# Patient Record
Sex: MALE | Race: WHITE | NOT HISPANIC OR LATINO
[De-identification: names, ages, dates, MRNs, and addresses within clinical notes are randomized per-mention and may not be internally consistent; named-entity substitution may affect disease eponyms.]

---

## 2017-02-02 PROBLEM — Z00.00 ENCOUNTER FOR PREVENTIVE HEALTH EXAMINATION: Status: ACTIVE | Noted: 2017-02-02

## 2017-02-13 ENCOUNTER — APPOINTMENT (OUTPATIENT)
Dept: SPINE | Facility: CLINIC | Age: 82
End: 2017-02-13

## 2017-02-13 VITALS
SYSTOLIC BLOOD PRESSURE: 114 MMHG | DIASTOLIC BLOOD PRESSURE: 65 MMHG | BODY MASS INDEX: 24.14 KG/M2 | HEIGHT: 69 IN | WEIGHT: 163 LBS | HEART RATE: 60 BPM

## 2017-02-13 DIAGNOSIS — I25.10 ATHEROSCLEROTIC HEART DISEASE OF NATIVE CORONARY ARTERY W/OUT ANGINA PECTORIS: ICD-10-CM

## 2017-02-13 DIAGNOSIS — Z82.49 FAMILY HISTORY OF ISCHEMIC HEART DISEASE AND OTHER DISEASES OF THE CIRCULATORY SYSTEM: ICD-10-CM

## 2017-02-13 DIAGNOSIS — Z78.9 OTHER SPECIFIED HEALTH STATUS: ICD-10-CM

## 2017-02-13 DIAGNOSIS — Z86.79 PERSONAL HISTORY OF OTHER DISEASES OF THE CIRCULATORY SYSTEM: ICD-10-CM

## 2017-02-13 DIAGNOSIS — Z86.19 PERSONAL HISTORY OF OTHER INFECTIOUS AND PARASITIC DISEASES: ICD-10-CM

## 2017-02-13 DIAGNOSIS — H91.90 UNSPECIFIED HEARING LOSS, UNSPECIFIED EAR: ICD-10-CM

## 2017-02-13 DIAGNOSIS — I62.00 NONTRAUMATIC SUBDURAL HEMORRHAGE, UNSPECIFIED: ICD-10-CM

## 2017-02-13 RX ORDER — TERAZOSIN 5 MG/1
5 CAPSULE ORAL
Refills: 0 | Status: ACTIVE | COMMUNITY

## 2017-02-13 RX ORDER — CHLORTHALIDONE 50 MG/1
TABLET ORAL
Refills: 0 | Status: ACTIVE | COMMUNITY

## 2017-02-13 RX ORDER — WARFARIN 3 MG/1
3 TABLET ORAL
Refills: 0 | Status: ACTIVE | COMMUNITY

## 2017-02-13 RX ORDER — ATORVASTATIN CALCIUM 20 MG/1
20 TABLET, FILM COATED ORAL
Refills: 0 | Status: ACTIVE | COMMUNITY

## 2017-02-13 RX ORDER — ASPIRIN 81 MG
81 TABLET, DELAYED RELEASE (ENTERIC COATED) ORAL
Refills: 0 | Status: ACTIVE | COMMUNITY

## 2017-02-13 RX ORDER — OMEPRAZOLE MAGNESIUM 20 MG/1
20 CAPSULE, DELAYED RELEASE ORAL
Refills: 0 | Status: ACTIVE | COMMUNITY

## 2017-02-13 RX ORDER — POTASSIUM CITRATE 10 MEQ/1
10 MEQ TABLET, EXTENDED RELEASE ORAL
Refills: 0 | Status: ACTIVE | COMMUNITY

## 2017-02-13 RX ORDER — LOSARTAN POTASSIUM 100 MG/1
100 TABLET, FILM COATED ORAL
Refills: 0 | Status: ACTIVE | COMMUNITY

## 2017-02-13 RX ORDER — FINASTERIDE 5 MG/1
5 TABLET, FILM COATED ORAL
Refills: 0 | Status: ACTIVE | COMMUNITY

## 2023-06-28 ENCOUNTER — INPATIENT (INPATIENT)
Facility: HOSPITAL | Age: 88
LOS: 4 days | Discharge: NURSING, REHAB OR LONG-TERM CARE | DRG: 689 | End: 2023-07-03
Attending: STUDENT IN AN ORGANIZED HEALTH CARE EDUCATION/TRAINING PROGRAM | Admitting: HOSPITALIST
Payer: MEDICARE

## 2023-06-28 VITALS
HEIGHT: 69 IN | HEART RATE: 63 BPM | TEMPERATURE: 98 F | RESPIRATION RATE: 17 BRPM | OXYGEN SATURATION: 99 % | WEIGHT: 169.98 LBS | DIASTOLIC BLOOD PRESSURE: 62 MMHG | SYSTOLIC BLOOD PRESSURE: 96 MMHG

## 2023-06-28 LAB
ALBUMIN SERPL ELPH-MCNC: 3.6 G/DL — SIGNIFICANT CHANGE UP (ref 3.3–5)
ALP SERPL-CCNC: 156 U/L — HIGH (ref 40–120)
ALT FLD-CCNC: 16 U/L — SIGNIFICANT CHANGE UP (ref 10–45)
ANION GAP SERPL CALC-SCNC: 14 MMOL/L — SIGNIFICANT CHANGE UP (ref 5–17)
AST SERPL-CCNC: 25 U/L — SIGNIFICANT CHANGE UP (ref 10–40)
BASOPHILS # BLD AUTO: 0.07 K/UL — SIGNIFICANT CHANGE UP (ref 0–0.2)
BASOPHILS NFR BLD AUTO: 0.7 % — SIGNIFICANT CHANGE UP (ref 0–2)
BILIRUB SERPL-MCNC: 0.5 MG/DL — SIGNIFICANT CHANGE UP (ref 0.2–1.2)
BUN SERPL-MCNC: 50 MG/DL — HIGH (ref 7–23)
CALCIUM SERPL-MCNC: 9.6 MG/DL — SIGNIFICANT CHANGE UP (ref 8.4–10.5)
CHLORIDE SERPL-SCNC: 104 MMOL/L — SIGNIFICANT CHANGE UP (ref 96–108)
CO2 SERPL-SCNC: 20 MMOL/L — LOW (ref 22–31)
CREAT SERPL-MCNC: 2.16 MG/DL — HIGH (ref 0.5–1.3)
EGFR: 28 ML/MIN/1.73M2 — LOW
EOSINOPHIL # BLD AUTO: 0.38 K/UL — SIGNIFICANT CHANGE UP (ref 0–0.5)
EOSINOPHIL NFR BLD AUTO: 4 % — SIGNIFICANT CHANGE UP (ref 0–6)
GAS PNL BLDV: SIGNIFICANT CHANGE UP
GLUCOSE SERPL-MCNC: 87 MG/DL — SIGNIFICANT CHANGE UP (ref 70–99)
HCT VFR BLD CALC: 41.7 % — SIGNIFICANT CHANGE UP (ref 39–50)
HGB BLD-MCNC: 13.4 G/DL — SIGNIFICANT CHANGE UP (ref 13–17)
IMM GRANULOCYTES NFR BLD AUTO: 0.3 % — SIGNIFICANT CHANGE UP (ref 0–0.9)
LYMPHOCYTES # BLD AUTO: 1.95 K/UL — SIGNIFICANT CHANGE UP (ref 1–3.3)
LYMPHOCYTES # BLD AUTO: 20.6 % — SIGNIFICANT CHANGE UP (ref 13–44)
MAGNESIUM SERPL-MCNC: 2.3 MG/DL — SIGNIFICANT CHANGE UP (ref 1.6–2.6)
MCHC RBC-ENTMCNC: 28.9 PG — SIGNIFICANT CHANGE UP (ref 27–34)
MCHC RBC-ENTMCNC: 32.1 GM/DL — SIGNIFICANT CHANGE UP (ref 32–36)
MCV RBC AUTO: 90.1 FL — SIGNIFICANT CHANGE UP (ref 80–100)
MONOCYTES # BLD AUTO: 1.2 K/UL — HIGH (ref 0–0.9)
MONOCYTES NFR BLD AUTO: 12.7 % — SIGNIFICANT CHANGE UP (ref 2–14)
NEUTROPHILS # BLD AUTO: 5.83 K/UL — SIGNIFICANT CHANGE UP (ref 1.8–7.4)
NEUTROPHILS NFR BLD AUTO: 61.7 % — SIGNIFICANT CHANGE UP (ref 43–77)
NRBC # BLD: 0 /100 WBCS — SIGNIFICANT CHANGE UP (ref 0–0)
PHOSPHATE SERPL-MCNC: 4.1 MG/DL — SIGNIFICANT CHANGE UP (ref 2.5–4.5)
PLATELET # BLD AUTO: 353 K/UL — SIGNIFICANT CHANGE UP (ref 150–400)
POTASSIUM SERPL-MCNC: 5.5 MMOL/L — HIGH (ref 3.5–5.3)
POTASSIUM SERPL-SCNC: 5.5 MMOL/L — HIGH (ref 3.5–5.3)
PROT SERPL-MCNC: 7.5 G/DL — SIGNIFICANT CHANGE UP (ref 6–8.3)
RBC # BLD: 4.63 M/UL — SIGNIFICANT CHANGE UP (ref 4.2–5.8)
RBC # FLD: 14.6 % — HIGH (ref 10.3–14.5)
SODIUM SERPL-SCNC: 138 MMOL/L — SIGNIFICANT CHANGE UP (ref 135–145)
WBC # BLD: 9.46 K/UL — SIGNIFICANT CHANGE UP (ref 3.8–10.5)
WBC # FLD AUTO: 9.46 K/UL — SIGNIFICANT CHANGE UP (ref 3.8–10.5)

## 2023-06-28 PROCEDURE — 71045 X-RAY EXAM CHEST 1 VIEW: CPT | Mod: 26

## 2023-06-28 PROCEDURE — 99285 EMERGENCY DEPT VISIT HI MDM: CPT

## 2023-06-28 RX ORDER — SODIUM CHLORIDE 9 MG/ML
1000 INJECTION INTRAMUSCULAR; INTRAVENOUS; SUBCUTANEOUS ONCE
Refills: 0 | Status: COMPLETED | OUTPATIENT
Start: 2023-06-28 | End: 2023-06-28

## 2023-06-28 RX ADMIN — SODIUM CHLORIDE 2000 MILLILITER(S): 9 INJECTION INTRAMUSCULAR; INTRAVENOUS; SUBCUTANEOUS at 17:18

## 2023-06-28 NOTE — ED ADULT NURSE NOTE - NSFALLHARMRISKINTERV_ED_ALL_ED

## 2023-06-28 NOTE — ED PROVIDER NOTE - IV ALTEPLASE EXCL REL HIDDEN
show
Patient is consistently future oriented and treatment seeking while on the unit, consistently denied suicidal ideation with the exception of the day of admission.

## 2023-06-28 NOTE — ED ADULT NURSE REASSESSMENT NOTE - NS ED NURSE REASSESS COMMENT FT1
Report received from JEAN MARIE Cee. Pt received A&Ox2-3, instructed on need for urine specimen sample, pt given urinal and attempted to urinate, states no need to urinate at this time. Bed locked and in lowest position, side rails raised, call bell within reach. Currently pending urine sample.

## 2023-06-28 NOTE — ED ADULT NURSE NOTE - OBJECTIVE STATEMENT
Male 89 years old with past medical history of Pacemaker, Stents and Afib, had UTI 3-4 weeks ago, treated with 2 antibiotics,  as per daughter for the past weeks pt is weak, decrease appetite and lethargic. Denies chest pain, sob, fever, chills, N/V or abdominal pain. Denies dysuria or hematuria. Safety and comfort maintained. Call bell within easy reach. Will continue to monitor. Male 89 years old with past medical history of Pacemaker, Stents and Afib, had UTI 3-4 weeks ago, treated with Levaquin and Bactrim,  as per daughter since then pt is weak, decrease appetite and lethargic. Denies chest pain, sob, fever, chills, N/V or abdominal pain. Denies dysuria or hematuria. Daughter states at baseline pt usually walks with a walker but for the past weeks pt been very weak to ambulate. Safety and comfort maintained. Call bell within easy reach. Will continue to monitor.

## 2023-06-28 NOTE — ED PROVIDER NOTE - CLINICAL SUMMARY MEDICAL DECISION MAKING FREE TEXT BOX
89-year-old male with past medical history of CHF, CAD with stents, CABG, PPM, recent UTI, presenting with 3 weeks of lethargy, decreased p.o., and difficulty walking. suggestive of uti vs electrolyte disturbances, will eval with labs, cxr, ekg, will give ivf, will reassess.

## 2023-06-28 NOTE — ED PROVIDER NOTE - OBJECTIVE STATEMENT
89-year-old male with past medical history of CHF, CAD with stents, CABG, PPM, recent UTI, presenting with 3 weeks of lethargy, decreased p.o., and difficulty walking.  Patient recently treated for UTI 3 weeks ago with oral Levaquin and then oral Bactrim.  Since then has had progressive lethargy decreased p.o. and difficulty walking at baseline with walker.  Came to ER for further evaluation.    Denies chest pain, shortness breath, LOC, falls, nausea vomiting, fevers, sick contact, recent travel. 89-year-old male with past medical history of CHF, CAD with stents, CABG, PPM, recent UTI, presenting with 3 weeks of lethargy, decreased p.o., and difficulty walking.  Patient recently treated for UTI 3 weeks ago with oral Levaquin and then oral Bactrim.  Since then has had progressive lethargy decreased p.o. and difficulty walking at baseline with walker.  Came to ER for further evaluation.    Denies chest pain, shortness breath, LOC, falls, nausea vomiting, fevers, sick contact, recent travel.    Attendinyo male presents with increased lethargy and decreased po intake for the past 2 weeks.  was treated for UTIs twice with different antibiotics in the past month.

## 2023-06-29 DIAGNOSIS — N39.0 URINARY TRACT INFECTION, SITE NOT SPECIFIED: ICD-10-CM

## 2023-06-29 DIAGNOSIS — N17.9 ACUTE KIDNEY FAILURE, UNSPECIFIED: ICD-10-CM

## 2023-06-29 DIAGNOSIS — I25.10 ATHEROSCLEROTIC HEART DISEASE OF NATIVE CORONARY ARTERY WITHOUT ANGINA PECTORIS: ICD-10-CM

## 2023-06-29 DIAGNOSIS — Z82.49 FAMILY HISTORY OF ISCHEMIC HEART DISEASE AND OTHER DISEASES OF THE CIRCULATORY SYSTEM: Chronic | ICD-10-CM

## 2023-06-29 DIAGNOSIS — R09.89 OTHER SPECIFIED SYMPTOMS AND SIGNS INVOLVING THE CIRCULATORY AND RESPIRATORY SYSTEMS: ICD-10-CM

## 2023-06-29 DIAGNOSIS — N40.0 BENIGN PROSTATIC HYPERPLASIA WITHOUT LOWER URINARY TRACT SYMPTOMS: ICD-10-CM

## 2023-06-29 DIAGNOSIS — E87.5 HYPERKALEMIA: ICD-10-CM

## 2023-06-29 DIAGNOSIS — Z29.9 ENCOUNTER FOR PROPHYLACTIC MEASURES, UNSPECIFIED: ICD-10-CM

## 2023-06-29 DIAGNOSIS — I50.9 HEART FAILURE, UNSPECIFIED: ICD-10-CM

## 2023-06-29 DIAGNOSIS — R53.1 WEAKNESS: ICD-10-CM

## 2023-06-29 DIAGNOSIS — R56.9 UNSPECIFIED CONVULSIONS: ICD-10-CM

## 2023-06-29 LAB
ALBUMIN SERPL ELPH-MCNC: 3.3 G/DL — SIGNIFICANT CHANGE UP (ref 3.3–5)
ALP SERPL-CCNC: 143 U/L — HIGH (ref 40–120)
ALT FLD-CCNC: 14 U/L — SIGNIFICANT CHANGE UP (ref 10–45)
ANION GAP SERPL CALC-SCNC: 15 MMOL/L — SIGNIFICANT CHANGE UP (ref 5–17)
APPEARANCE UR: ABNORMAL
AST SERPL-CCNC: 22 U/L — SIGNIFICANT CHANGE UP (ref 10–40)
BASOPHILS # BLD AUTO: 0.05 K/UL — SIGNIFICANT CHANGE UP (ref 0–0.2)
BASOPHILS NFR BLD AUTO: 0.6 % — SIGNIFICANT CHANGE UP (ref 0–2)
BILIRUB SERPL-MCNC: 0.6 MG/DL — SIGNIFICANT CHANGE UP (ref 0.2–1.2)
BILIRUB UR-MCNC: NEGATIVE — SIGNIFICANT CHANGE UP
BUN SERPL-MCNC: 44 MG/DL — HIGH (ref 7–23)
CALCIUM SERPL-MCNC: 9.3 MG/DL — SIGNIFICANT CHANGE UP (ref 8.4–10.5)
CHLORIDE SERPL-SCNC: 104 MMOL/L — SIGNIFICANT CHANGE UP (ref 96–108)
CO2 SERPL-SCNC: 19 MMOL/L — LOW (ref 22–31)
COLOR SPEC: ABNORMAL
CREAT SERPL-MCNC: 1.96 MG/DL — HIGH (ref 0.5–1.3)
DIFF PNL FLD: ABNORMAL
EGFR: 31 ML/MIN/1.73M2 — LOW
EOSINOPHIL # BLD AUTO: 0.32 K/UL — SIGNIFICANT CHANGE UP (ref 0–0.5)
EOSINOPHIL NFR BLD AUTO: 4 % — SIGNIFICANT CHANGE UP (ref 0–6)
GLUCOSE SERPL-MCNC: 75 MG/DL — SIGNIFICANT CHANGE UP (ref 70–99)
GLUCOSE UR QL: NEGATIVE — SIGNIFICANT CHANGE UP
HCT VFR BLD CALC: 41.6 % — SIGNIFICANT CHANGE UP (ref 39–50)
HGB BLD-MCNC: 13 G/DL — SIGNIFICANT CHANGE UP (ref 13–17)
IMM GRANULOCYTES NFR BLD AUTO: 0.4 % — SIGNIFICANT CHANGE UP (ref 0–0.9)
KETONES UR-MCNC: NEGATIVE — SIGNIFICANT CHANGE UP
LEUKOCYTE ESTERASE UR-ACNC: ABNORMAL
LYMPHOCYTES # BLD AUTO: 1.88 K/UL — SIGNIFICANT CHANGE UP (ref 1–3.3)
LYMPHOCYTES # BLD AUTO: 23.5 % — SIGNIFICANT CHANGE UP (ref 13–44)
MAGNESIUM SERPL-MCNC: 2.2 MG/DL — SIGNIFICANT CHANGE UP (ref 1.6–2.6)
MCHC RBC-ENTMCNC: 29.4 PG — SIGNIFICANT CHANGE UP (ref 27–34)
MCHC RBC-ENTMCNC: 31.3 GM/DL — LOW (ref 32–36)
MCV RBC AUTO: 94.1 FL — SIGNIFICANT CHANGE UP (ref 80–100)
MONOCYTES # BLD AUTO: 0.88 K/UL — SIGNIFICANT CHANGE UP (ref 0–0.9)
MONOCYTES NFR BLD AUTO: 11 % — SIGNIFICANT CHANGE UP (ref 2–14)
NEUTROPHILS # BLD AUTO: 4.84 K/UL — SIGNIFICANT CHANGE UP (ref 1.8–7.4)
NEUTROPHILS NFR BLD AUTO: 60.5 % — SIGNIFICANT CHANGE UP (ref 43–77)
NITRITE UR-MCNC: NEGATIVE — SIGNIFICANT CHANGE UP
NRBC # BLD: 0 /100 WBCS — SIGNIFICANT CHANGE UP (ref 0–0)
PH UR: 5.5 — SIGNIFICANT CHANGE UP (ref 5–8)
PLATELET # BLD AUTO: 273 K/UL — SIGNIFICANT CHANGE UP (ref 150–400)
POTASSIUM SERPL-MCNC: 5.2 MMOL/L — SIGNIFICANT CHANGE UP (ref 3.5–5.3)
POTASSIUM SERPL-SCNC: 5.2 MMOL/L — SIGNIFICANT CHANGE UP (ref 3.5–5.3)
PROT SERPL-MCNC: 7.1 G/DL — SIGNIFICANT CHANGE UP (ref 6–8.3)
PROT UR-MCNC: ABNORMAL
RBC # BLD: 4.42 M/UL — SIGNIFICANT CHANGE UP (ref 4.2–5.8)
RBC # FLD: 14.5 % — SIGNIFICANT CHANGE UP (ref 10.3–14.5)
SODIUM SERPL-SCNC: 138 MMOL/L — SIGNIFICANT CHANGE UP (ref 135–145)
SP GR SPEC: 1.01 — SIGNIFICANT CHANGE UP (ref 1.01–1.02)
UROBILINOGEN FLD QL: NEGATIVE — SIGNIFICANT CHANGE UP
WBC # BLD: 8 K/UL — SIGNIFICANT CHANGE UP (ref 3.8–10.5)
WBC # FLD AUTO: 8 K/UL — SIGNIFICANT CHANGE UP (ref 3.8–10.5)

## 2023-06-29 PROCEDURE — 70450 CT HEAD/BRAIN W/O DYE: CPT | Mod: 26

## 2023-06-29 PROCEDURE — 99223 1ST HOSP IP/OBS HIGH 75: CPT

## 2023-06-29 PROCEDURE — 76770 US EXAM ABDO BACK WALL COMP: CPT | Mod: 26

## 2023-06-29 PROCEDURE — 99221 1ST HOSP IP/OBS SF/LOW 40: CPT

## 2023-06-29 RX ORDER — ASPIRIN/CALCIUM CARB/MAGNESIUM 324 MG
81 TABLET ORAL DAILY
Refills: 0 | Status: DISCONTINUED | OUTPATIENT
Start: 2023-06-29 | End: 2023-07-03

## 2023-06-29 RX ORDER — PANTOPRAZOLE SODIUM 20 MG/1
40 TABLET, DELAYED RELEASE ORAL
Refills: 0 | Status: DISCONTINUED | OUTPATIENT
Start: 2023-06-29 | End: 2023-07-03

## 2023-06-29 RX ORDER — ACETAMINOPHEN 500 MG
650 TABLET ORAL EVERY 6 HOURS
Refills: 0 | Status: DISCONTINUED | OUTPATIENT
Start: 2023-06-29 | End: 2023-07-03

## 2023-06-29 RX ORDER — LEVETIRACETAM 250 MG/1
500 TABLET, FILM COATED ORAL
Refills: 0 | Status: DISCONTINUED | OUTPATIENT
Start: 2023-06-29 | End: 2023-07-03

## 2023-06-29 RX ORDER — ONDANSETRON 8 MG/1
4 TABLET, FILM COATED ORAL EVERY 8 HOURS
Refills: 0 | Status: DISCONTINUED | OUTPATIENT
Start: 2023-06-29 | End: 2023-07-03

## 2023-06-29 RX ORDER — SODIUM ZIRCONIUM CYCLOSILICATE 10 G/10G
5 POWDER, FOR SUSPENSION ORAL ONCE
Refills: 0 | Status: COMPLETED | OUTPATIENT
Start: 2023-06-29 | End: 2023-06-29

## 2023-06-29 RX ORDER — CEFTRIAXONE 500 MG/1
1000 INJECTION, POWDER, FOR SOLUTION INTRAMUSCULAR; INTRAVENOUS EVERY 24 HOURS
Refills: 0 | Status: DISCONTINUED | OUTPATIENT
Start: 2023-06-29 | End: 2023-06-29

## 2023-06-29 RX ORDER — FOLIC ACID 0.8 MG
1 TABLET ORAL DAILY
Refills: 0 | Status: DISCONTINUED | OUTPATIENT
Start: 2023-06-29 | End: 2023-07-03

## 2023-06-29 RX ORDER — METOPROLOL TARTRATE 50 MG
25 TABLET ORAL
Refills: 0 | Status: DISCONTINUED | OUTPATIENT
Start: 2023-06-29 | End: 2023-06-30

## 2023-06-29 RX ORDER — LANOLIN ALCOHOL/MO/W.PET/CERES
3 CREAM (GRAM) TOPICAL AT BEDTIME
Refills: 0 | Status: DISCONTINUED | OUTPATIENT
Start: 2023-06-29 | End: 2023-07-03

## 2023-06-29 RX ORDER — CEFTRIAXONE 500 MG/1
1000 INJECTION, POWDER, FOR SOLUTION INTRAMUSCULAR; INTRAVENOUS EVERY 24 HOURS
Refills: 0 | Status: DISCONTINUED | OUTPATIENT
Start: 2023-06-30 | End: 2023-07-03

## 2023-06-29 RX ORDER — ATORVASTATIN CALCIUM 80 MG/1
80 TABLET, FILM COATED ORAL AT BEDTIME
Refills: 0 | Status: DISCONTINUED | OUTPATIENT
Start: 2023-06-29 | End: 2023-07-03

## 2023-06-29 RX ADMIN — Medication 3 MILLIGRAM(S): at 22:15

## 2023-06-29 RX ADMIN — CEFTRIAXONE 100 MILLIGRAM(S): 500 INJECTION, POWDER, FOR SOLUTION INTRAMUSCULAR; INTRAVENOUS at 01:33

## 2023-06-29 RX ADMIN — Medication 25 MILLIGRAM(S): at 18:24

## 2023-06-29 RX ADMIN — LEVETIRACETAM 500 MILLIGRAM(S): 250 TABLET, FILM COATED ORAL at 05:50

## 2023-06-29 RX ADMIN — Medication 1 MILLIGRAM(S): at 15:14

## 2023-06-29 RX ADMIN — ATORVASTATIN CALCIUM 80 MILLIGRAM(S): 80 TABLET, FILM COATED ORAL at 22:15

## 2023-06-29 RX ADMIN — Medication 25 MILLIGRAM(S): at 05:52

## 2023-06-29 RX ADMIN — SODIUM ZIRCONIUM CYCLOSILICATE 5 GRAM(S): 10 POWDER, FOR SUSPENSION ORAL at 09:25

## 2023-06-29 RX ADMIN — LEVETIRACETAM 500 MILLIGRAM(S): 250 TABLET, FILM COATED ORAL at 18:24

## 2023-06-29 RX ADMIN — Medication 81 MILLIGRAM(S): at 15:13

## 2023-06-29 RX ADMIN — PANTOPRAZOLE SODIUM 40 MILLIGRAM(S): 20 TABLET, DELAYED RELEASE ORAL at 06:15

## 2023-06-29 NOTE — H&P ADULT - NSHPPHYSICALEXAM_GEN_ALL_CORE
Vital Signs Last 24 Hrs  T(C): 36.4 (06-29-23 @ 02:09), Max: 36.9 (06-28-23 @ 16:59)  T(F): 97.6 (06-29-23 @ 02:09), Max: 98.4 (06-28-23 @ 16:59)  HR: 63 (06-29-23 @ 02:09) (60 - 88)  BP: 126/62 (06-29-23 @ 02:09) (94/69 - 126/62)  BP(mean): 77 (06-28-23 @ 23:45) (73 - 87)  RR: 16 (06-29-23 @ 02:09) (16 - 18)  SpO2: 98% (06-29-23 @ 02:09) (96% - 100%)

## 2023-06-29 NOTE — PATIENT PROFILE ADULT - FALL HARM RISK - HARM RISK INTERVENTIONS

## 2023-06-29 NOTE — PHYSICAL THERAPY INITIAL EVALUATION ADULT - PLANNED THERAPY INTERVENTIONS, PT EVAL
GOAL: Patient will be able to negotiate 2 steps in 2 weeks/balance training/bed mobility training/gait training/strengthening/transfer training

## 2023-06-29 NOTE — CHART NOTE - NSCHARTNOTEFT_GEN_A_CORE
Patient seen and examined at bedside. No acute overnight events. Pt denies dyspnea, chest pain fevers, chills, urinary or bowel changes, active sites of bleeding or any other symptoms at this time.    PE: elderly male patient, following commands. Otherwise unremarkable.     Labs and imaging reviewed.    Plan:   -C/w abx for UTI, f/u on urine cx   -Hyperkalemia resolved   -CT head pending     Called daughter Anushka to update on management, left VM. Will try again for tomorrow.

## 2023-06-29 NOTE — PHYSICAL THERAPY INITIAL EVALUATION ADULT - ADDITIONAL COMMENTS
Patient lives alone with 24 hrs HHA in a 2 level house; as per HHA present at the bedside: 2 steps to enter w/o hand rails, chair lift inside to reach bedroom. Pt. ambulates independently w/RW, needs assistance w/ADLs. As per HHA, its been 2 weeks since pt. ambulated independently. HHA denies any fall. Pt. was unable to participate in interview questions 2/2 hearing impairment, pt. wears hearing aid, but it was not charged at the time of IE.

## 2023-06-29 NOTE — H&P ADULT - HISTORY OF PRESENT ILLNESS
89M w/ hx of CAD s/p CABG, s/p PPM, CHF?, HTN, recent UTI p/w decreased PO and weakness.  89M w/ hx of CAD s/p CABG, s/p PPM, CHF, seizures, CKD, anemia HTN, recent UTI p/w decreased PO and weakness. Pt has been very weak and almost bed bound for the past month. Pt has hx of UTIs requiring admission as recently as 1 year ago as per daughter. Around June 4th daughter became concerned over change in pt's mood and awareness. Called PMD on phone and was prescribed 1 week course of Levaquin. Pt did not seem to improve significantly and pt went to see urologist around 6/14. Reportedly urine was checked and pt was switched to Bactrim. Took course from 6/16-6/19. Requested repeat urine testing 6/22 at urology office but results have not returned. Pt's daughter checks on father in person every Wednesday. Earlier, daughter went to see father who seemed too weak and confused so brought him to hospital for further evaluation. Family stopped pt's spironolactone and losartan around 2 weeks ago for episodes of hypotension. Pt otherwise compliant with meds as they are provided by [facility]. Pt endorsing some intermittent dysuria and urinary frequency. Endorses also feeling weak and confused.    In ER: Given IV Ceftriaxone NS 1L

## 2023-06-29 NOTE — PHYSICAL THERAPY INITIAL EVALUATION ADULT - PERTINENT HX OF CURRENT PROBLEM, REHAB EVAL
89M w/ hx of CAD s/p CABG, s/p PPM, CHF, seizures, CKD, anemia HTN, recent UTI p/w decreased PO and weakness. Pt has been very weak and almost bed bound for the past month. Pt has hx of UTIs requiring admission as recently as 1 year ago as per daughter. Around June 4th daughter became concerned over change in pt's mood and awareness. Called PMD on phone and was prescribed 1 week course of Levaquin. Pt did not seem to improve significantly and pt went to see urologist around 6/14. Reportedly urine was checked and pt was switched to Bactrim. Took course from 6/16-6/19. Requested repeat urine testing 6/22 at urology office but results have not returned. Pt's daughter checks on father in person every Wednesday. Earlier, daughter went to see father who seemed too weak and confused so brought him to hospital for further evaluation. Family stopped pt's spironolactone and losartan around 2 weeks ago for episodes of hypotension. Pt otherwise compliant with meds as they are provided by [facility]. Pt endorsing some intermittent dysuria and urinary frequency. Endorses also feeling weak and confused. Hospital course: 6/28 CXR: No focal consolidation. 6/29 US Kidney/Bladder: Right kidney moderate hydronephrosis, with bilateral ureteral jets visualized in the urinary bladder. No obstructing calculus identified.

## 2023-06-29 NOTE — H&P ADULT - NSICDXPASTMEDICALHX_GEN_ALL_CORE_FT
PAST MEDICAL HISTORY:  CAD (coronary artery disease)     CHF, chronic     Essential hypertension

## 2023-06-29 NOTE — H&P ADULT - ASSESSMENT
89M w/ hx of CAD s/p CABG, s/p PPM, CHF, seizures, CKD, anemia HTN, recent UTI p/w decreased PO and weakness concerning for UTI as well as hyperkalemia and SOPHY on CKD

## 2023-06-29 NOTE — H&P ADULT - PROBLEM SELECTOR PLAN 2
Possibly from UTI. Given it seems to have been ongoing for 1 month despite multiple courses of antibiotics for UTI will order CTH as well given hx of SDH in past. Not on AC  -Falls precautions  -PT consult  -Will order CTH non-con

## 2023-06-29 NOTE — H&P ADULT - PROBLEM SELECTOR PLAN 7
Will sometimes take lasix at home with he gains weight. Family had increased dose until beginning of this month. s/p PPM. Currently appears relatively euvolemic  -Now off losartan and spironolactone  -Cont. metoprolol BID with hold parameters

## 2023-06-29 NOTE — H&P ADULT - PROBLEM SELECTOR PLAN 4
[de-identified] : VPA 63, CBC/CMP normal [FreeTextEntry1] : CBC and CMP normal\par VPA 74 Baseline crt 1.5 in prior records. Pre-renal in nature? Also hx of kidney stones as per daughter  -Trend BMP  -Renal dose medications  -F/u Kidney and bladder US

## 2023-06-29 NOTE — H&P ADULT - NSHPSOURCEINFORD_GEN_ALL_CORE
Last Durolane injection given on 6/2/21 in right knee.  Ok to get new approval? Chart(s)/Patient Chart(s)/Patient/Child

## 2023-06-29 NOTE — H&P ADULT - PROBLEM SELECTOR PLAN 3
K 5.5 maybe in setting of SOPHY. Reportedly has not taken ARB or spironolactone for 2 weeks.  -Trend BMP, Mg  -Will order lokelma

## 2023-06-29 NOTE — H&P ADULT - TIME BILLING
Need to interview and examine patient, discuss care with family, provide counseling, and review prior medical records

## 2023-06-29 NOTE — H&P ADULT - NSHPSOCIALHISTORY_GEN_ALL_CORE
Denies smoking or ETOH. Usually uses walker but non-ambulatory recently. Has HHA, otherwise lives alone

## 2023-06-29 NOTE — PHYSICAL THERAPY INITIAL EVALUATION ADULT - GAIT DEVIATIONS NOTED, PT EVAL
decreased kamryn/decreased velocity of limb motion/decreased step length/decreased weight-shifting ability

## 2023-06-29 NOTE — H&P ADULT - NSHPLABSRESULTS_GEN_ALL_CORE
I have reviewed the labs, imaging and ekg. EKG with NSR HR 90 QTc 440 PVC, Flat III, aVF, CXR w/o focal consolidations

## 2023-06-29 NOTE — H&P ADULT - PROBLEM SELECTOR PLAN 1
UA could be considered positive. Hx of UTIs in past. S/p treatment with Levaquin and bactrim earlier this month.  -Cont. IV Ceftriaxone for now  -F/u UCx  -Given hx of prior UTIs, low threshold to broaden if concern increases

## 2023-06-29 NOTE — CONSULT NOTE ADULT - ASSESSMENT
Impression:    Sacral/bilateral Buttocks deep tissue injury present on admission  incontinence dermatitis  Incontinence of bowel and bladder  Pressure Injury Prophylaxis    Recommend:  1.) topical therapy: sacral/buttock injury - cleanse with incontinence cleanser, pat dry, apply Poncho ointment BID and PRN for incontinent episodes  2.) Incontinence Management - incontinence cleanser, pads, pericare BID  3.) Maintain on an alternating air with low air loss surface  4.) Turn and reposition Q 2 hours  5.) Nutrition optimization  6.) Offload heels/feet with complete cair air fluidized boots; ensure that the soles of the feet are not resting on the foot board of the bed.    Care as per medicine. Will not actively follow but will remain available. Please recall for new issues or deterioration.  Upon [facility] [address] [place] [address] [phone]  Thank you for this consult  Seen and discussed with clinical nurse  Kelly Thorpe, LIA-C, CWOCN via TEAMS

## 2023-06-30 ENCOUNTER — TRANSCRIPTION ENCOUNTER (OUTPATIENT)
Age: 88
End: 2023-06-30

## 2023-06-30 DIAGNOSIS — R00.1 BRADYCARDIA, UNSPECIFIED: ICD-10-CM

## 2023-06-30 LAB
ANION GAP SERPL CALC-SCNC: 15 MMOL/L — SIGNIFICANT CHANGE UP (ref 5–17)
BUN SERPL-MCNC: 41 MG/DL — HIGH (ref 7–23)
CALCIUM SERPL-MCNC: 9.6 MG/DL — SIGNIFICANT CHANGE UP (ref 8.4–10.5)
CHLORIDE SERPL-SCNC: 104 MMOL/L — SIGNIFICANT CHANGE UP (ref 96–108)
CO2 SERPL-SCNC: 16 MMOL/L — LOW (ref 22–31)
CREAT SERPL-MCNC: 1.87 MG/DL — HIGH (ref 0.5–1.3)
EGFR: 33 ML/MIN/1.73M2 — LOW
GLUCOSE SERPL-MCNC: 79 MG/DL — SIGNIFICANT CHANGE UP (ref 70–99)
POTASSIUM SERPL-MCNC: 5.6 MMOL/L — HIGH (ref 3.5–5.3)
POTASSIUM SERPL-SCNC: 5.6 MMOL/L — HIGH (ref 3.5–5.3)
SODIUM SERPL-SCNC: 135 MMOL/L — SIGNIFICANT CHANGE UP (ref 135–145)

## 2023-06-30 PROCEDURE — 99233 SBSQ HOSP IP/OBS HIGH 50: CPT

## 2023-06-30 PROCEDURE — 93010 ELECTROCARDIOGRAM REPORT: CPT

## 2023-06-30 RX ORDER — SODIUM ZIRCONIUM CYCLOSILICATE 10 G/10G
5 POWDER, FOR SUSPENSION ORAL ONCE
Refills: 0 | Status: COMPLETED | OUTPATIENT
Start: 2023-06-30 | End: 2023-06-30

## 2023-06-30 RX ADMIN — CEFTRIAXONE 100 MILLIGRAM(S): 500 INJECTION, POWDER, FOR SOLUTION INTRAMUSCULAR; INTRAVENOUS at 00:41

## 2023-06-30 RX ADMIN — Medication 81 MILLIGRAM(S): at 13:32

## 2023-06-30 RX ADMIN — PANTOPRAZOLE SODIUM 40 MILLIGRAM(S): 20 TABLET, DELAYED RELEASE ORAL at 10:05

## 2023-06-30 RX ADMIN — LEVETIRACETAM 500 MILLIGRAM(S): 250 TABLET, FILM COATED ORAL at 10:05

## 2023-06-30 RX ADMIN — ATORVASTATIN CALCIUM 80 MILLIGRAM(S): 80 TABLET, FILM COATED ORAL at 21:39

## 2023-06-30 RX ADMIN — SODIUM ZIRCONIUM CYCLOSILICATE 5 GRAM(S): 10 POWDER, FOR SUSPENSION ORAL at 17:24

## 2023-06-30 RX ADMIN — LEVETIRACETAM 500 MILLIGRAM(S): 250 TABLET, FILM COATED ORAL at 17:25

## 2023-06-30 RX ADMIN — Medication 1 MILLIGRAM(S): at 13:32

## 2023-06-30 RX ADMIN — Medication 3 MILLIGRAM(S): at 21:39

## 2023-06-30 NOTE — DIETITIAN INITIAL EVALUATION ADULT - ENERGY INTAKE
Poor (<50%) - Pt reports poor-fair appetite/PO intake since admission, is currently ordered for a low sodium diet. ~50% of meals consumed.   - Pt's aide requesting mighty shakes TID, reports pt does not like Ensure shakes.   - Pt made aware of menu ordering procedures in house, food preferences obtained will honor as able.

## 2023-06-30 NOTE — DIETITIAN INITIAL EVALUATION ADULT - PERTINENT LABORATORY DATA
06-30    135  |  104  |  41<H>  ----------------------------<  79  5.6<H>   |  16<L>  |  1.87<H>    Ca    9.6      30 Jun 2023 10:27  Phos  4.1     06-28  Mg     2.2     06-29    TPro  7.1  /  Alb  3.3  /  TBili  0.6  /  DBili  x   /  AST  22  /  ALT  14  /  AlkPhos  143<H>  06-29

## 2023-06-30 NOTE — DIETITIAN INITIAL EVALUATION ADULT - ORAL INTAKE PTA/DIET HISTORY
Pt's aide reports decreased appetite/PO intake x 1 month PTA, likely consuming <75% of nutrition needs >/1 month. Reports pt consumes 3 meals a day however experiences early satiety, supplements with smoothies and protein shakes PRN.   - NKFA/intolerances reported.   - No therapeutic restrictions reported.  - Micronutrient/Other supplementation: none   - Protein-energy supplementation: none   - No hx of chewing/swallowing difficulties.

## 2023-06-30 NOTE — DIETITIAN INITIAL EVALUATION ADULT - REASON INDICATOR FOR ASSESSMENT
Nutrition consult warranted for: pressure injury stage >/2   Information obtained from: electronic medical record, patient and aide at bedside.   Chart reviewed, events noted.

## 2023-06-30 NOTE — DIETITIAN INITIAL EVALUATION ADULT - PROBLEM SELECTOR PROBLEM 2
Spoke with patient and informed her that per office notes she is to continue both the omeprazole and Zantac. Patient stated that Dr. Wall does not agree with the acid reflux diagnosis and is referring her to the [facility] he believes its more vocal cord related. Patient stated that she does not notice much of a difference on the two medications make a difference and does not feel she needs the Zantac.     Ok to discontinue th Zantac ?    Weakness

## 2023-06-30 NOTE — PROVIDER CONTACT NOTE (OTHER) - ACTION/TREATMENT ORDERED:
EKG ordered and attempted, however pt adamantly refused, became agitated, and verbally abusive towards RN and PCA. Also refused AM meds. Provider made aware and 6 AM medications rescheduled.

## 2023-06-30 NOTE — DISCHARGE NOTE NURSING/CASE MANAGEMENT/SOCIAL WORK - NSDCPEFALRISK_GEN_ALL_CORE
For information on Fall & Injury Prevention, visit: [email or web address] OR  [email or web address] OR  https://www.cdc.gov/steadi/patient.html

## 2023-06-30 NOTE — PROGRESS NOTE ADULT - SUBJECTIVE AND OBJECTIVE BOX
[facility] Division of Hospital Medicine  Bell Eden MD  Available via MS Teams  Pager: [phone]    SUBJECTIVE / OVERNIGHT EVENTS: Patient seen and examined at bedside. Overnight, bradycardic while asleep, hr ranging between 38-45.  Pt denies dyspnea, chest pain fevers, chills, cough, HA, dizziness, syncope, chest palpitations, abd pain, n/v/d, urinary or bowel changes, active sites of bleeding or any other symptoms at this time.    ADDITIONAL REVIEW OF SYSTEMS: negative     MEDICATIONS  (STANDING):  aspirin enteric coated 81 milliGRAM(s) Oral daily  atorvastatin 80 milliGRAM(s) Oral at bedtime  cefTRIAXone   IVPB 1000 milliGRAM(s) IV Intermittent every 24 hours  folic acid 1 milliGRAM(s) Oral daily  levETIRAcetam 500 milliGRAM(s) Oral two times a day  pantoprazole    Tablet 40 milliGRAM(s) Oral before breakfast  sodium zirconium cyclosilicate 5 Gram(s) Oral once    MEDICATIONS  (PRN):  acetaminophen     Tablet .. 650 milliGRAM(s) Oral every 6 hours PRN Temp greater or equal to 38C (100.4F), Mild Pain (1 - 3)  melatonin 3 milliGRAM(s) Oral at bedtime PRN Insomnia  ondansetron Injectable 4 milliGRAM(s) IV Push every 8 hours PRN Nausea and/or Vomiting      I&O's Summary    29 Jun 2023 07:01  -  30 Jun 2023 07:00  --------------------------------------------------------  IN: 200 mL / OUT: 0 mL / NET: 200 mL        PHYSICAL EXAM:  Vital Signs Last 24 Hrs  T(C): 36.3 (30 Jun 2023 09:05), Max: 37.1 (29 Jun 2023 16:07)  T(F): 97.4 (30 Jun 2023 09:05), Max: 98.7 (29 Jun 2023 16:07)  HR: 59 (30 Jun 2023 09:05) (36 - 68)  BP: 129/60 (30 Jun 2023 09:05) (103/55 - 129/60)  BP(mean): --  RR: 18 (30 Jun 2023 09:05) (18 - 18)  SpO2: 94% (30 Jun 2023 09:05) (94% - 99%)    Parameters below as of 30 Jun 2023 09:05  Patient On (Oxygen Delivery Method): room air      CONSTITUTIONAL: frail elderly male   EYES: PERRLA; conjunctiva and sclera clear  ENMT: Moist oral mucosa, no pharyngeal injection or exudates; normal dentition  NECK: Supple, no palpable masses; no thyromegaly  RESPIRATORY: Normal respiratory effort; lungs are clear to auscultation bilaterally  CARDIOVASCULAR: Regular rate and rhythm, normal S1 and S2, no murmur/rub/gallop; No lower extremity edema; Peripheral pulses are 2+ bilaterally  ABDOMEN: Nontender to palpation, normoactive bowel sounds, no rebound/guarding; No hepatosplenomegaly  MUSCULOSKELETAL:   no clubbing or cyanosis of digits; no joint swelling or tenderness to palpation  PSYCH: A+O to person, place, and time; affect appropriate  NEUROLOGY: CN 2-12 are intact and symmetric; no gross sensory deficits   SKIN: No rashes; no palpable lesions    LABS:                        13.0   8.00  )-----------( 273      ( 29 Jun 2023 07:16 )             41.6     06-30    135  |  104  |  41<H>  ----------------------------<  79  5.6<H>   |  16<L>  |  1.87<H>    Ca    9.6      30 Jun 2023 10:27  Phos  4.1     06-28  Mg     2.2     06-29    TPro  7.1  /  Alb  3.3  /  TBili  0.6  /  DBili  x   /  AST  22  /  ALT  14  /  AlkPhos  143<H>  06-29          Urinalysis Basic - ( 30 Jun 2023 10:27 )    Color: x / Appearance: x / SG: x / pH: x  Gluc: 79 mg/dL / Ketone: x  / Bili: x / Urobili: x   Blood: x / Protein: x / Nitrite: x   Leuk Esterase: x / RBC: x / WBC x   Sq Epi: x / Non Sq Epi: x / Bacteria: x            RADIOLOGY & ADDITIONAL TESTS:  New Results Reviewed Today:   New Imaging Personally Reviewed Today:  New Electrocardiogram Personally Reviewed Today:  Prior or Outpatient Records Reviewed Today:    COMMUNICATION:  Care Discussed with Consultants/Other Providers and Details of Discussion:  Discussions with Patient/Family: Spoke to daughter Anushka and updated on management. States patient has routine follow up with Cardiologist Dr. Eduardo Hsu [phone]. Daughter also states PPM was placed 7 years ago, performed by Dr. Bhatt (cannot specify number).   PCP Communication:

## 2023-06-30 NOTE — DIETITIAN INITIAL EVALUATION ADULT - REASON FOR ADMISSION
Per chart, "89M w/ hx of CAD s/p CABG, s/p PPM, CHF, seizures, CKD, anemia HTN, recent UTI p/w decreased PO and weakness. Pt has been very weak and almost bed bound for the past month. Pt has hx of UTIs requiring admission as recently as 1 year ago as per daughter. Around June 4th daughter became concerned over change in pt's mood and awareness."

## 2023-06-30 NOTE — PROCEDURE NOTE - ADDITIONAL PROCEDURE DETAILS
In addition to above:  Underlying rhythm V-sense rate 30's  Pacing burden: 95.4% , 0% AP  AT/AF: 0%  Events: no arrhythmia or low rate events recorded since last interrogation 3/16/23  Rate histograms showing no ventricular rates <60bpm

## 2023-06-30 NOTE — DISCHARGE NOTE NURSING/CASE MANAGEMENT/SOCIAL WORK - PATIENT PORTAL LINK FT
You can access the FollowMyHealth Patient Portal offered by [facility] by registering at the following website: [email or web address] By joining [facility]’s FollowMyHealth portal, you will also be able to view your health information using other applications (apps) compatible with our system.

## 2023-06-30 NOTE — DIETITIAN INITIAL EVALUATION ADULT - PHYSCIAL ASSESSMENT
Weight Hx Per:  - Source: patient   - UBW: 143 pounds   - Reported weight changes: Pt's aide endorses weight loss in the past month, however is unable to quantify amount.     [facility]  - [facility] (5/11/2021)     Current Admission Weights:  - Dosing weight: 170 pounds (06/28) ?accuracy   - Rd obtained bed scale weight: 127 pounds   --> Pt appears to be closer to bed scale weight     Weight Change:  - 11% weight loss noted x 1 month, (UBW vs dosing weight)     **  Will continue to monitor weight trends as available/able.     IBW: 160 pounds   %IBW: 79%

## 2023-06-30 NOTE — DIETITIAN INITIAL EVALUATION ADULT - NSFNSGIIOFT_GEN_A_CORE
- Pt denies nausea, vomiting, diarrhea, or constipation.   - Last BM: 6/29; not currently ordered for bowel regimen

## 2023-06-30 NOTE — DIETITIAN INITIAL EVALUATION ADULT - PERSON TAUGHT/METHOD
Emphasized the importance of adequate kcal and protein intake, provided recommendations to optimize nutritional intake in case of decreased appetite, recommended small frequent meals by consuming nutrient-dense snacks between meals, to start with protein, and sips of supplement throughout the day./verbal instruction/teach back - (Patient repeats in own words)/patient instructed/caregiver

## 2023-06-30 NOTE — DIETITIAN INITIAL EVALUATION ADULT - PERTINENT MEDS FT
MEDICATIONS  (STANDING):  aspirin enteric coated 81 milliGRAM(s) Oral daily  atorvastatin 80 milliGRAM(s) Oral at bedtime  cefTRIAXone   IVPB 1000 milliGRAM(s) IV Intermittent every 24 hours  folic acid 1 milliGRAM(s) Oral daily  levETIRAcetam 500 milliGRAM(s) Oral two times a day  metoprolol tartrate 25 milliGRAM(s) Oral two times a day  pantoprazole    Tablet 40 milliGRAM(s) Oral before breakfast    MEDICATIONS  (PRN):  acetaminophen     Tablet .. 650 milliGRAM(s) Oral every 6 hours PRN Temp greater or equal to 38C (100.4F), Mild Pain (1 - 3)  melatonin 3 milliGRAM(s) Oral at bedtime PRN Insomnia  ondansetron Injectable 4 milliGRAM(s) IV Push every 8 hours PRN Nausea and/or Vomiting

## 2023-06-30 NOTE — DIETITIAN INITIAL EVALUATION ADULT - PROBLEM SELECTOR PLAN 4
Baseline crt 1.5 in prior records. Pre-renal in nature? Also hx of kidney stones as per daughter  -Trend BMP  -Renal dose medications  -F/u Kidney and bladder US

## 2023-07-01 LAB
ANION GAP SERPL CALC-SCNC: 13 MMOL/L — SIGNIFICANT CHANGE UP (ref 5–17)
BUN SERPL-MCNC: 38 MG/DL — HIGH (ref 7–23)
CALCIUM SERPL-MCNC: 9.5 MG/DL — SIGNIFICANT CHANGE UP (ref 8.4–10.5)
CHLORIDE SERPL-SCNC: 103 MMOL/L — SIGNIFICANT CHANGE UP (ref 96–108)
CO2 SERPL-SCNC: 22 MMOL/L — SIGNIFICANT CHANGE UP (ref 22–31)
CREAT SERPL-MCNC: 2.11 MG/DL — HIGH (ref 0.5–1.3)
EGFR: 28 ML/MIN/1.73M2 — LOW
GLUCOSE SERPL-MCNC: 95 MG/DL — SIGNIFICANT CHANGE UP (ref 70–99)
POTASSIUM SERPL-MCNC: 5.1 MMOL/L — SIGNIFICANT CHANGE UP (ref 3.5–5.3)
POTASSIUM SERPL-SCNC: 5.1 MMOL/L — SIGNIFICANT CHANGE UP (ref 3.5–5.3)
SODIUM SERPL-SCNC: 138 MMOL/L — SIGNIFICANT CHANGE UP (ref 135–145)

## 2023-07-01 PROCEDURE — 99223 1ST HOSP IP/OBS HIGH 75: CPT

## 2023-07-01 PROCEDURE — 99233 SBSQ HOSP IP/OBS HIGH 50: CPT

## 2023-07-01 PROCEDURE — 93280 PM DEVICE PROGR EVAL DUAL: CPT | Mod: 26,GC

## 2023-07-01 RX ORDER — SODIUM CHLORIDE 9 MG/ML
1000 INJECTION INTRAMUSCULAR; INTRAVENOUS; SUBCUTANEOUS
Refills: 0 | Status: DISCONTINUED | OUTPATIENT
Start: 2023-07-01 | End: 2023-07-02

## 2023-07-01 RX ORDER — METOPROLOL TARTRATE 50 MG
25 TABLET ORAL
Refills: 0 | Status: DISCONTINUED | OUTPATIENT
Start: 2023-07-01 | End: 2023-07-03

## 2023-07-01 RX ADMIN — LEVETIRACETAM 500 MILLIGRAM(S): 250 TABLET, FILM COATED ORAL at 06:17

## 2023-07-01 RX ADMIN — Medication 3 MILLIGRAM(S): at 22:22

## 2023-07-01 RX ADMIN — LEVETIRACETAM 500 MILLIGRAM(S): 250 TABLET, FILM COATED ORAL at 17:39

## 2023-07-01 RX ADMIN — Medication 1 MILLIGRAM(S): at 12:12

## 2023-07-01 RX ADMIN — Medication 81 MILLIGRAM(S): at 12:12

## 2023-07-01 RX ADMIN — SODIUM CHLORIDE 75 MILLILITER(S): 9 INJECTION INTRAMUSCULAR; INTRAVENOUS; SUBCUTANEOUS at 17:39

## 2023-07-01 RX ADMIN — Medication 650 MILLIGRAM(S): at 12:45

## 2023-07-01 RX ADMIN — CEFTRIAXONE 100 MILLIGRAM(S): 500 INJECTION, POWDER, FOR SOLUTION INTRAMUSCULAR; INTRAVENOUS at 00:09

## 2023-07-01 RX ADMIN — PANTOPRAZOLE SODIUM 40 MILLIGRAM(S): 20 TABLET, DELAYED RELEASE ORAL at 06:17

## 2023-07-01 RX ADMIN — ATORVASTATIN CALCIUM 80 MILLIGRAM(S): 80 TABLET, FILM COATED ORAL at 22:22

## 2023-07-01 NOTE — CONSULT NOTE ADULT - ASSESSMENT
89M w/ hx of CAD (s/p CABG), s/p PPM, CHF, seizures, CKD, HTN, anemia, UTI, presented initially with weakness, found to have UTI, admitted to medicine for further care. Cardiology consulted on 6/30 for bradycardia noted on vital sign checks.    #Possible bradycardia:  - EKG reviewed showing V-paced rhythm at 60bpm  - PPM interrogated showing functioning well, no recorded bradycardia episodes on device  - discussed the bradycardia on vital sign flowsheets with nurse, nurse reports the heart rate was likely obtained from the pulse ox which earlier in the shift was not reliable and had to be changed, when nurse palpated pulse it was at 60bpm  - agree with placing pt on telemetry for closer monitoring  - pt remains asymptomatic from cardiovascular standpoint  - c/w aspirin, atorva  - if no bradycardia confirmed can resume home metoprolol    Uriah Novak MD  Cardiology Fellow - PGY4    Please check [email or web address] for cardiology service schedule and contact information.

## 2023-07-01 NOTE — PROGRESS NOTE ADULT - SUBJECTIVE AND OBJECTIVE BOX
[facility] Division of Hospital Medicine  Bell Eden MD  Available via MS Teams  Pager: [phone]    SUBJECTIVE / OVERNIGHT EVENTS: Patient seen and examined at bedside. No acute overnight events. Pt denies dyspnea, chest pain fevers, chills, cough, HA, dizziness, syncope, chest palpitations, abd pain, n/v/d, urinary or bowel changes, active sites of bleeding or any other symptoms at this time.    ADDITIONAL REVIEW OF SYSTEMS: negative     MEDICATIONS  (STANDING):  aspirin enteric coated 81 milliGRAM(s) Oral daily  atorvastatin 80 milliGRAM(s) Oral at bedtime  cefTRIAXone   IVPB 1000 milliGRAM(s) IV Intermittent every 24 hours  folic acid 1 milliGRAM(s) Oral daily  levETIRAcetam 500 milliGRAM(s) Oral two times a day  metoprolol tartrate 25 milliGRAM(s) Oral two times a day  pantoprazole    Tablet 40 milliGRAM(s) Oral before breakfast  sodium chloride 0.9%. 1000 milliLiter(s) (75 mL/Hr) IV Continuous <Continuous>    MEDICATIONS  (PRN):  acetaminophen     Tablet .. 650 milliGRAM(s) Oral every 6 hours PRN Temp greater or equal to 38C (100.4F), Mild Pain (1 - 3)  melatonin 3 milliGRAM(s) Oral at bedtime PRN Insomnia  ondansetron Injectable 4 milliGRAM(s) IV Push every 8 hours PRN Nausea and/or Vomiting      I&O's Summary      PHYSICAL EXAM:  Vital Signs Last 24 Hrs  T(C): 36.8 (01 Jul 2023 10:37), Max: 37.4 (30 Jun 2023 16:18)  T(F): 98.3 (01 Jul 2023 10:37), Max: 99.3 (30 Jun 2023 16:18)  HR: 61 (01 Jul 2023 06:35) (60 - 61)  BP: 122/53 (01 Jul 2023 10:37) (111/63 - 122/53)  BP(mean): --  RR: 18 (01 Jul 2023 06:35) (18 - 18)  SpO2: 94% (01 Jul 2023 06:35) (94% - 99%)    Parameters below as of 01 Jul 2023 06:35  Patient On (Oxygen Delivery Method): room air      CONSTITUTIONAL: frail elderly male   EYES: PERRLA; conjunctiva and sclera clear  ENMT: Moist oral mucosa, no pharyngeal injection or exudates; normal dentition  NECK: Supple, no palpable masses; no thyromegaly  RESPIRATORY: Normal respiratory effort; lungs are clear to auscultation bilaterally  CARDIOVASCULAR: Regular rate and rhythm, normal S1 and S2, no murmur/rub/gallop; No lower extremity edema; Peripheral pulses are 2+ bilaterally  ABDOMEN: Nontender to palpation, normoactive bowel sounds, no rebound/guarding; No hepatosplenomegaly  MUSCULOSKELETAL:   no clubbing or cyanosis of digits; no joint swelling or tenderness to palpation  PSYCH: A+O to person, place, and time; affect appropriate  NEUROLOGY: CN 2-12 are intact and symmetric; no gross sensory deficits   SKIN: No rashes; no palpable lesions    LABS:    07-01    138  |  103  |  38<H>  ----------------------------<  95  5.1   |  22  |  2.11<H>    Ca    9.5      01 Jul 2023 10:15            Urinalysis Basic - ( 01 Jul 2023 10:15 )    Color: x / Appearance: x / SG: x / pH: x  Gluc: 95 mg/dL / Ketone: x  / Bili: x / Urobili: x   Blood: x / Protein: x / Nitrite: x   Leuk Esterase: x / RBC: x / WBC x   Sq Epi: x / Non Sq Epi: x / Bacteria: x        Culture - Urine (collected 28 Jun 2023 23:44)  Source: Clean Catch Clean Catch (Midstream)  Preliminary Report (01 Jul 2023 09:31):    >100,000 CFU/ml Escherichia coli          RADIOLOGY & ADDITIONAL TESTS:  New Results Reviewed Today:   New Imaging Personally Reviewed Today:  < from: US Kidney and Bladder (06.29.23 @ 10:34) >  IMPRESSION:  Right kidney moderate hydronephrosis, with bilateral ureteral jets   visualized in the urinary bladder. No obstructing calculus identified.    Thickened urinary bladder wall, with dependent echogenic material.      < end of copied text >    < from: CT Head No Cont (06.29.23 @ 18:11) >    IMPRESSION:    No hydrocephalus, acute intracranial hemorrhage, mass effect, or brain   edema.    Moderate white matter microvascular ischemic disease.    --- End of Report ---      < end of copied text >    New Electrocardiogram Personally Reviewed Today:  Prior or Outpatient Records Reviewed Today:    COMMUNICATION:  Care Discussed with Consultants/Other Providers and Details of Discussion:  Discussions with Patient/Family: Updated family yesterday regarding management.   PCP Communication:

## 2023-07-01 NOTE — CONSULT NOTE ADULT - ATTENDING COMMENTS
Agree with plan as outlined above.  Bradycardia not consistent with normal pacing and capture  continue metoprolol    ARUNA Buckner                                                                                Sixty Minutes spent in direct patient care and communication

## 2023-07-01 NOTE — CONSULT NOTE ADULT - SUBJECTIVE AND OBJECTIVE BOX
Wound Surgery Consult Note:    HPI:  89M w/ hx of CAD s/p CABG, s/p PPM, CHF, seizures, CKD, anemia HTN, recent UTI p/w decreased PO and weakness. Pt has been very weak and almost bed bound for the past month. Pt has hx of UTIs requiring admission as recently as 1 year ago as per daughter. Around June 4th daughter became concerned over change in pt's mood and awareness. Called PMD on phone and was prescribed 1 week course of Levaquin. Pt did not seem to improve significantly and pt went to see urologist around 6/14. Reportedly urine was checked and pt was switched to Bactrim. Took course from 6/16-6/19. Requested repeat urine testing 6/22 at urology office but results have not returned. Pt's daughter checks on father in person every Wednesday. Earlier, daughter went to see father who seemed too weak and confused so brought him to hospital for further evaluation. Family stopped pt's spironolactone and losartan around 2 weeks ago for episodes of hypotension. Pt otherwise compliant with meds as they are provided by [facility]. Pt endorsing some intermittent dysuria and urinary frequency. Endorses also feeling weak and confused. (29 Jun 2023 04:27)    Request for wound care consult for sacral/bilateral buttocks skin breakdown received from nursing. Mr. Do was encountered on an alternating air with low air loss surface. He is grossly incontinent of mushy/liquid stool and urine.  He denied any pain or discomfort associated with his skin and/or buttocks/sacrum His extreme immobility, inactivity, gross incontinence of stool and urine as well as poor nutritional status all contribute to his high risk for pressure injury development and hinder healing. Identification of the initial signs of deep tissue damage at the level of the skin within 72 hours of admission make this an injury that occurred prior to admission.    PAST MEDICAL & SURGICAL HISTORY:  CHF, chronic  CAD (coronary artery disease)  Essential hypertension  FH: CABG (coronary artery bypass surgery)    REVIEW OF SYSTEMS:    Constitutional:     [ ] negative [ ] fevers [ ] chills [ ] weight loss [ ] weight gain  [x ] fatigue  HEENT:                  [x ] negative [ ] dry eyes [ ] eye irritation [ ] postnasal drip [ ] nasal congestion   CV:                         [x ] negative  [ ] chest pain [ ] orthopnea [ ] palpitations [ ] tachycardia  Resp:                     [ x] negative [ ] cough [ ] shortness of breath [ ] dyspnea [ ] wheezing [ ] sputum [ ] hemoptysis  GI:                          [ ] negative [ ] nausea [ ] vomiting [ ] diarrhea [ ] constipation [ ] abd pain [ ] dysphagia [x ] incontinent of bowel  :                        [ ] negative [ ] dysuria [ ] nocturia [ ] hematuria [ ] increased urinary frequency [ x] incontinent of urine  Musculoskeletal:     [ ] negative [ ] back pain [ ] myalgias [ ] arthralgias [ ] fracture [ ] ambulatory  [ x] non-ambulatory  Skin:                       [ ] negative [ ] rash [ ] itch [ ] wound [x ] skin discoloration  Neurological:        [x ] negative [ ] headache [ ] dizziness [ ] syncope [x ] weakness [ ] numbness  Psychiatric:           [x ] negative [ ] anxiety [ ] depression  Endocrine:            [x ] negative [ ] diabetes [ ] thyroid problem  Heme/Lymph:      [x ] negative [ ] anemia [ ] bleeding problem  Allergic/Immune: [x ] negative [ ] itchy eyes [ ] nasal discharge [ ] hives [ ] angioedema    [x ] All other systems negative    MEDICATIONS  (STANDING):  aspirin enteric coated 81 milliGRAM(s) Oral daily  atorvastatin 80 milliGRAM(s) Oral at bedtime  folic acid 1 milliGRAM(s) Oral daily  levETIRAcetam 500 milliGRAM(s) Oral two times a day  metoprolol tartrate 25 milliGRAM(s) Oral two times a day  pantoprazole    Tablet 40 milliGRAM(s) Oral before breakfast    MEDICATIONS  (PRN):  acetaminophen     Tablet .. 650 milliGRAM(s) Oral every 6 hours PRN Temp greater or equal to 38C (100.4F), Mild Pain (1 - 3)  melatonin 3 milliGRAM(s) Oral at bedtime PRN Insomnia  ondansetron Injectable 4 milliGRAM(s) IV Push every 8 hours PRN Nausea and/or Vomiting    Allergies    No Known Allergies    Intolerances    SOCIAL HISTORY:  , Denies smoking, ETOH, drugs    FAMILY HISTORY: no pertinent family history among first degree relatives    Vital Signs Last 24 Hrs  T(C): 36.4 (29 Jun 2023 02:09), Max: 36.9 (28 Jun 2023 16:59)  T(F): 97.6 (29 Jun 2023 02:09), Max: 98.4 (28 Jun 2023 16:59)  HR: 64 (29 Jun 2023 05:47) (60 - 88)  BP: 128/75 (29 Jun 2023 05:47) (94/69 - 128/75)  BP(mean): 77 (28 Jun 2023 23:45) (73 - 87)  RR: 16 (29 Jun 2023 02:09) (16 - 18)  SpO2: 98% (29 Jun 2023 02:09) (96% - 100%)    Parameters below as of 29 Jun 2023 02:09  Patient On (Oxygen Delivery Method): room air    Physical Exam:  General: alert, WN  Ophthamology: sclera clear  ENMT: moist mucous membranes, trachea midline  Respiratory: equal chest rise with respirations  Gastrointestinal: soft NT/ND  Neurology: verbal,  following commands  Psych: calm, appropriate  Musculoskeletal: no contractures  Vascular: BLE edema equal  Skin:  Sacral/bilateral buttocks with deep maroon discolored intact skin in and around the gluteal cleft with surrounding erythema L 1cm X W 1.5cm x D none, no drainage  No odor, erythema, increased warmth, tenderness, induration, fluctuance    LABS:  06-29    138  |  104  |  44<H>  ----------------------------<  75  5.2   |  19<L>  |  1.96<H>    Ca    9.3      29 Jun 2023 07:16  Phos  4.1     06-28  Mg     2.2     06-29    TPro  7.1  /  Alb  3.3  /  TBili  0.6  /  DBili  x   /  AST  22  /  ALT  14  /  AlkPhos  143<H>  06-29                          13.0   8.00  )-----------( 273      ( 29 Jun 2023 07:16 )             41.6       Urinalysis Basic - ( 29 Jun 2023 07:16 )    Color: x / Appearance: x / SG: x / pH: x  Gluc: 75 mg/dL / Ketone: x  / Bili: x / Urobili: x   Blood: x / Protein: x / Nitrite: x   Leuk Esterase: x / RBC: x / WBC x   Sq Epi: x / Non Sq Epi: x / Bacteria: x            
Cardiology Consult Note   [Please check [email or web address]" for cardiology service schedule and contact information]    HPI:  89M w/ hx of CAD (s/p CABG), s/p PPM, CHF, seizures, CKD, HTN, anemia, UTI, presented initially with weakness, found to have UTI, admitted to medicine for further care. Cardiology consulted on 6/30 for bradycardia noted on vital sign checks.    On interview with me, pt denies noticing the bradycardia, denies feeling palpitations, light-headed/dizzy/fainting, chest pain, sob, orthopnea/pnd, LE swelling.      PAST MEDICAL & SURGICAL HISTORY:  CHF, chronic      CAD (coronary artery disease)      Essential hypertension      FH: CABG (coronary artery bypass surgery)        FAMILY HISTORY:    SOCIAL HISTORY:  unchanged    MEDICATIONS:  aspirin enteric coated 81 milliGRAM(s) Oral daily    cefTRIAXone   IVPB 1000 milliGRAM(s) IV Intermittent every 24 hours      acetaminophen     Tablet .. 650 milliGRAM(s) Oral every 6 hours PRN  levETIRAcetam 500 milliGRAM(s) Oral two times a day  melatonin 3 milliGRAM(s) Oral at bedtime PRN  ondansetron Injectable 4 milliGRAM(s) IV Push every 8 hours PRN    pantoprazole    Tablet 40 milliGRAM(s) Oral before breakfast    atorvastatin 80 milliGRAM(s) Oral at bedtime    folic acid 1 milliGRAM(s) Oral daily      REVIEW OF SYSTEMS:  CV: chest pain (-), palpitation (-), orthopnea (-), PND (-), edema (-)  PULM: SOB (-), cough (-), wheezing (-), hemoptysis (-).   CONST: fever (-), chills (-) or fatigue (-)  GI: abdominal distension (-), abdominal pain (-) , nausea/vomiting (-), hematemesis, (-), melena (-), hematochezia (-)  : dysuria (-), frequency (-), hematuria (-).   NEURO: numbness (-), weakness (-), dizziness (-)  SKIN: itching (-), rash (-)  HEENT:  visual changes (-); vertigo or throat pain (-);  neck stiffness (-)     All other review of systems is negative unless indicated above.   -------------------------------------------------------------------------------------------  PHYSICAL EXAM:  T(C): 37.4 (06-30-23 @ 16:18), Max: 37.4 (06-30-23 @ 16:18)  HR: 60 (06-30-23 @ 16:18) (36 - 60)  BP: 111/63 (06-30-23 @ 16:18) (111/63 - 129/60)  RR: 18 (06-30-23 @ 16:18) (18 - 18)  SpO2: 99% (06-30-23 @ 16:18) (94% - 99%)  Wt(kg): --  I&O's Summary    29 Jun 2023 07:01  -  30 Jun 2023 07:00  --------------------------------------------------------  IN: 200 mL / OUT: 0 mL / NET: 200 mL        GENERAL: NAD  HEAD: Atraumatic, Normocephalic.  EYES: EOMI, PERRLA, conjunctiva and sclera clear.  ENT: Moist mucous membranes.  NECK: Supple, No JVD.  CHEST/LUNG: Clear to auscultation bilaterally; No rales, rhonchi, wheezing, or rubs. Unlabored respirations.  HEART: Regular rate and rhythm; No murmurs, rubs, or gallops.  ABDOMEN: Bowel sounds present; Soft, Nontender, Nondistended.   EXTREMITIES:  2+ Peripheral Pulses, brisk capillary refill. No clubbing, cyanosis, or edema.    -------------------------------------------------------------------------------------------  LABS:                          13.0   8.00  )-----------( 273      ( 29 Jun 2023 07:16 )             41.6     06-30    135  |  104  |  41<H>  ----------------------------<  79  5.6<H>   |  16<L>  |  1.87<H>    Ca    9.6      30 Jun 2023 10:27  Mg     2.2     06-29    TPro  7.1  /  Alb  3.3  /  TBili  0.6  /  DBili  x   /  AST  22  /  ALT  14  /  AlkPhos  143<H>  06-29          cefTRIAXone   IVPB 1000 milliGRAM(s) IV Intermittent every 24 hours      acetaminophen     Tablet .. 650 milliGRAM(s) Oral every 6 hours PRN  levETIRAcetam 500 milliGRAM(s) Oral two times a day  melatonin 3 milliGRAM(s) Oral at bedtime PRN  ondansetron Injectable 4 milliGRAM(s) IV Push every 8 hours PRN          -------------------------------------------------------------------------------------------

## 2023-07-01 NOTE — PROGRESS NOTE ADULT - PROBLEM SELECTOR PLAN 4
RESOLVED. K 5.5 maybe in setting of SOPHY. Reportedly has not taken ARB or spironolactone for 2 weeks.  -Trend BMP, Mg  -s/p Lokelma

## 2023-07-02 ENCOUNTER — TRANSCRIPTION ENCOUNTER (OUTPATIENT)
Age: 88
End: 2023-07-02

## 2023-07-02 LAB
ANION GAP SERPL CALC-SCNC: 11 MMOL/L — SIGNIFICANT CHANGE UP (ref 5–17)
ANION GAP SERPL CALC-SCNC: 12 MMOL/L — SIGNIFICANT CHANGE UP (ref 5–17)
APPEARANCE UR: ABNORMAL
BACTERIA # UR AUTO: NEGATIVE — SIGNIFICANT CHANGE UP
BILIRUB UR-MCNC: NEGATIVE — SIGNIFICANT CHANGE UP
BUN SERPL-MCNC: 36 MG/DL — HIGH (ref 7–23)
BUN SERPL-MCNC: 37 MG/DL — HIGH (ref 7–23)
CALCIUM SERPL-MCNC: 9.5 MG/DL — SIGNIFICANT CHANGE UP (ref 8.4–10.5)
CALCIUM SERPL-MCNC: 9.8 MG/DL — SIGNIFICANT CHANGE UP (ref 8.4–10.5)
CHLORIDE SERPL-SCNC: 102 MMOL/L — SIGNIFICANT CHANGE UP (ref 96–108)
CHLORIDE SERPL-SCNC: 103 MMOL/L — SIGNIFICANT CHANGE UP (ref 96–108)
CO2 SERPL-SCNC: 19 MMOL/L — LOW (ref 22–31)
CO2 SERPL-SCNC: 23 MMOL/L — SIGNIFICANT CHANGE UP (ref 22–31)
COLOR SPEC: SIGNIFICANT CHANGE UP
CREAT ?TM UR-MCNC: 94 MG/DL — SIGNIFICANT CHANGE UP
CREAT SERPL-MCNC: 2.05 MG/DL — HIGH (ref 0.5–1.3)
CREAT SERPL-MCNC: 2.09 MG/DL — HIGH (ref 0.5–1.3)
DIFF PNL FLD: ABNORMAL
EGFR: 29 ML/MIN/1.73M2 — LOW
EGFR: 29 ML/MIN/1.73M2 — LOW
EPI CELLS # UR: 4 /HPF — SIGNIFICANT CHANGE UP
GLUCOSE SERPL-MCNC: 141 MG/DL — HIGH (ref 70–99)
GLUCOSE SERPL-MCNC: 71 MG/DL — SIGNIFICANT CHANGE UP (ref 70–99)
GLUCOSE UR QL: NEGATIVE — SIGNIFICANT CHANGE UP
HYALINE CASTS # UR AUTO: 2702 /LPF — HIGH (ref 0–2)
KETONES UR-MCNC: NEGATIVE — SIGNIFICANT CHANGE UP
LEUKOCYTE ESTERASE UR-ACNC: ABNORMAL
NITRITE UR-MCNC: NEGATIVE — SIGNIFICANT CHANGE UP
OSMOLALITY UR: 401 MOS/KG — SIGNIFICANT CHANGE UP (ref 300–900)
PH UR: 6 — SIGNIFICANT CHANGE UP (ref 5–8)
POTASSIUM SERPL-MCNC: 4.7 MMOL/L — SIGNIFICANT CHANGE UP (ref 3.5–5.3)
POTASSIUM SERPL-MCNC: 4.9 MMOL/L — SIGNIFICANT CHANGE UP (ref 3.5–5.3)
POTASSIUM SERPL-SCNC: 4.7 MMOL/L — SIGNIFICANT CHANGE UP (ref 3.5–5.3)
POTASSIUM SERPL-SCNC: 4.9 MMOL/L — SIGNIFICANT CHANGE UP (ref 3.5–5.3)
POTASSIUM UR-SCNC: 38 MMOL/L — SIGNIFICANT CHANGE UP
PROT ?TM UR-MCNC: 118 MG/DL — HIGH (ref 0–12)
PROT UR-MCNC: ABNORMAL
PROT/CREAT UR-RTO: 1.3 RATIO — HIGH (ref 0–0.2)
RBC CASTS # UR COMP ASSIST: 6 /HPF — HIGH (ref 0–4)
SODIUM SERPL-SCNC: 134 MMOL/L — LOW (ref 135–145)
SODIUM SERPL-SCNC: 136 MMOL/L — SIGNIFICANT CHANGE UP (ref 135–145)
SODIUM UR-SCNC: 62 MMOL/L — SIGNIFICANT CHANGE UP
SP GR SPEC: 1.01 — SIGNIFICANT CHANGE UP (ref 1.01–1.02)
UROBILINOGEN FLD QL: NEGATIVE — SIGNIFICANT CHANGE UP
WBC UR QL: 2067 /HPF — HIGH (ref 0–5)

## 2023-07-02 PROCEDURE — 99232 SBSQ HOSP IP/OBS MODERATE 35: CPT

## 2023-07-02 PROCEDURE — 99233 SBSQ HOSP IP/OBS HIGH 50: CPT

## 2023-07-02 RX ORDER — SODIUM CHLORIDE 9 MG/ML
100 INJECTION INTRAMUSCULAR; INTRAVENOUS; SUBCUTANEOUS ONCE
Refills: 0 | Status: COMPLETED | OUTPATIENT
Start: 2023-07-02 | End: 2023-07-02

## 2023-07-02 RX ADMIN — Medication 1 MILLIGRAM(S): at 11:52

## 2023-07-02 RX ADMIN — Medication 650 MILLIGRAM(S): at 13:45

## 2023-07-02 RX ADMIN — LEVETIRACETAM 500 MILLIGRAM(S): 250 TABLET, FILM COATED ORAL at 17:47

## 2023-07-02 RX ADMIN — PANTOPRAZOLE SODIUM 40 MILLIGRAM(S): 20 TABLET, DELAYED RELEASE ORAL at 05:45

## 2023-07-02 RX ADMIN — SODIUM CHLORIDE 200 MILLILITER(S): 9 INJECTION INTRAMUSCULAR; INTRAVENOUS; SUBCUTANEOUS at 18:40

## 2023-07-02 RX ADMIN — Medication 650 MILLIGRAM(S): at 12:45

## 2023-07-02 RX ADMIN — Medication 81 MILLIGRAM(S): at 11:51

## 2023-07-02 RX ADMIN — LEVETIRACETAM 500 MILLIGRAM(S): 250 TABLET, FILM COATED ORAL at 05:45

## 2023-07-02 RX ADMIN — ATORVASTATIN CALCIUM 80 MILLIGRAM(S): 80 TABLET, FILM COATED ORAL at 21:23

## 2023-07-02 RX ADMIN — Medication 25 MILLIGRAM(S): at 05:45

## 2023-07-02 RX ADMIN — CEFTRIAXONE 100 MILLIGRAM(S): 500 INJECTION, POWDER, FOR SOLUTION INTRAMUSCULAR; INTRAVENOUS at 00:12

## 2023-07-02 NOTE — DISCHARGE NOTE PROVIDER - HOSPITAL COURSE
89M w/ hx of CAD s/p CABG, s/p PPM, CHF, seizures, CKD, anemia HTN, recent UTI p/w decreased PO and weakness. Pt has been very weak and almost bed bound for the past month. Pt has hx of UTIs requiring admission as recently as 1 year ago as per daughter. Around June 4th daughter became concerned over change in pt's mood and awareness. Called PMD on phone and was prescribed 1 week course of Levaquin. Pt did not seem to improve significantly and pt went to see urologist around 6/14. Reportedly urine was checked and pt was switched to Bactrim. Took course from 6/16-6/19. Requested repeat urine testing 6/22 at urology office but results have not returned. Pt's daughter checks on father in person every Wednesday. Earlier, daughter went to see father who seemed too weak and confused so brought him to hospital for further evaluation. Family stopped pt's spironolactone and losartan around 2 weeks ago for episodes of hypotension. Pt otherwise compliant with meds as they are provided by [facility]. Pt endorsing some intermittent dysuria and urinary frequency. Endorses also feeling weak and confused. In ER: Given IV Ceftriaxone NS 1L    Urinary tract infection:  - UA could be considered positive. Hx of UTIs in past. S/p treatment with Levaquin and bactrim earlier this month.  - Urine culture positive for Ecoli  - Treated with IV Ceftriaxone    Bradycardia:   - 6/30 overnight, bradycardic while asleep, hr ranging between 38-45, with intermittent bradycardia during daytime. Asymptomatic.   - Metoprolol was held and cardiology was consulted, along with pacemaker interrogation   - Pacemaker interrogation found no arrhythmia or low rate events recorded since last interrogation 3/16/23  - Per cardiology bradycardia not consistent with normal pacing and capture - metoprolol resumed 7/1  - Patient monitored on Tele    Weakness: possibly 2/2 UTI   - CTH completed demonstrating no hydrocephalus, acute intracranial hemorrhage, mass effect, or brain edema.   - Falls precautions  - PT evaluation - recommend home PT    Hyperkalemia:  - K 5.5 (possibly iso SOPHY)   - Patient received Lokelma for hyperkalemia on 6/29 and 6/30  - Trend BMP for monitor K    Acute renal failure superimposed on stage 3 chronic kidney disease: possibly 2/2 pre-renal; patient also with h/o kidney stones per daughter   - Kidney and bladder US demonstrated right kidney moderate hydronephrosis, with bilateral ureteral jets visualized in the urinary bladder. No obstructing calculus identified.  -  Renally dose medications  - Trend BMP for monitor Cr    H/o seizures:  - c/w keppra BID    H/o CAD s/p CABG 1991:  - c/w ASA, atorvastatin     H/o CHF:  - Patient appears relatively euvolemic; now off losartan and spironolactone   - c/w metoprolol       H/o BPH:  - Off meds now.    DVT DVT:   - Lovenox 89M w/ hx of CAD s/p CABG, s/p PPM, CHF, seizures, CKD, anemia HTN, recent UTI p/w decreased PO and weakness. Pt has been very weak and almost bed bound for the past month. Pt has hx of UTIs requiring admission as recently as 1 year ago as per daughter. Around June 4th daughter became concerned over change in pt's mood and awareness. Called PMD on phone and was prescribed 1 week course of Levaquin. Pt did not seem to improve significantly and pt went to see urologist around 6/14. Reportedly urine was checked and pt was switched to Bactrim. Took course from 6/16-6/19. Requested repeat urine testing 6/22 at urology office but results have not returned. Pt's daughter checks on father in person every Wednesday. Earlier, daughter went to see father who seemed too weak and confused so brought him to hospital for further evaluation. Family stopped pt's spironolactone and losartan around 2 weeks ago for episodes of hypotension. Pt otherwise compliant with meds as they are provided by [facility]. Pt endorsing some intermittent dysuria and urinary frequency. Endorses also feeling weak and confused. In ER: Given IV Ceftriaxone NS 1L    Urinary tract infection:  UA could be considered positive. Hx of UTIs in past. S/p treatment with Levaquin and bactrim earlier this month.  Urine culture positive for Ecoli  Treated with IV Ceftriaxone    Bradycardia:   6/30 overnight, bradycardic while asleep, hr ranging between 38-45, with intermittent bradycardia during daytime. Asymptomatic.   Metoprolol was held and cardiology was consulted, along with pacemaker interrogation   Pacemaker interrogation found no arrhythmia or low rate events recorded since last interrogation 3/16/23  Per cardiology bradycardia not consistent with normal pacing and capture - metoprolol resumed 7/1  Patient monitored on Tele    Weakness: possibly 2/2 UTI   CTH completed demonstrating no hydrocephalus, acute intracranial hemorrhage, mass effect, or brain edema.   Falls precautions  PT evaluation - recommend home PT  daughter prefer sae   DCP with med rec discussed with Dr Garcia Pt is cleared for DC to [facility]   follow up with pcp out patient 89M w/ hx of CAD s/p CABG, s/p PPM, CHF, seizures, CKD, anemia HTN, recent UTI p/w decreased PO and weakness. Pt has been very weak and almost bed bound for the past month. Pt has hx of UTIs requiring admission as recently as 1 year ago as per daughter. Around June 4th daughter became concerned over change in pt's mood and awareness. Called PMD on phone and was prescribed 1 week course of Levaquin. Pt did not seem to improve significantly and pt went to see urologist around 6/14. Reportedly urine was checked and pt was switched to Bactrim. Took course from 6/16-6/19. Requested repeat urine testing 6/22 at urology office but results have not returned. Pt's daughter checks on father in person every Wednesday. Earlier, daughter went to see father who seemed too weak and confused so brought him to hospital for further evaluation. Family stopped pt's spironolactone and losartan around 2 weeks ago for episodes of hypotension. Pt otherwise compliant with meds as they are provided by [facility]. Pt endorsing some intermittent dysuria and urinary frequency. Endorses also feeling weak and confused. In ER: Given IV Ceftriaxone NS 1L    Urinary tract infection:  UA could be considered positive. Hx of UTIs in past. S/p treatment with Levaquin and bactrim earlier this month.  Urine culture positive for Ecoli unable to run sensitivities  Treated with IV Ceftriaxone with improvement in symptoms    Bradycardia:   6/30 overnight, bradycardic while asleep, hr ranging between 38-45, with intermittent bradycardia during daytime. Asymptomatic.   Metoprolol was held and cardiology was consulted, along with pacemaker interrogation   Pacemaker interrogation found no arrhythmia or low rate events recorded since last interrogation 3/16/23  Per cardiology bradycardia not consistent with normal pacing and capture - metoprolol resumed 7/1  Patient monitored on Tele    Weakness: possibly 2/2 UTI   CTH completed demonstrating no hydrocephalus, acute intracranial hemorrhage, mass effect, or brain edema.   Falls precautions  PT evaluation - recommend home PT  daughter prefer sae   DCP with med rec discussed with Dr Garcia Pt is cleared for DC to [facility]   follow up with pcp out patient     #UTI  #Sinus Bradycardia  #Hyponatremia  #Hyperkalemia 89M w/ hx of CAD s/p CABG, s/p PPM, CHF, seizures, CKD, anemia HTN, recent UTI p/w decreased PO and weakness. Pt has been very weak and almost bed bound for the past month. Pt has hx of UTIs requiring admission as recently as 1 year ago as per daughter. Around June 4th daughter became concerned over change in pt's mood and awareness. Called PMD on phone and was prescribed 1 week course of Levaquin. Pt did not seem to improve significantly and pt went to see urologist around 6/14. Reportedly urine was checked and pt was switched to Bactrim. Took course from 6/16-6/19. Requested repeat urine testing 6/22 at urology office but results have not returned. Pt's daughter checks on father in person every Wednesday. Earlier, daughter went to see father who seemed too weak and confused so brought him to hospital for further evaluation. Family stopped pt's spironolactone and losartan around 2 weeks ago for episodes of hypotension. Pt otherwise compliant with meds as they are provided by [facility]. Pt endorsing some intermittent dysuria and urinary frequency. Endorses also feeling weak and confused. In ER: Given IV Ceftriaxone NS 1L    Urinary tract infection:  UA could be considered positive. Hx of UTIs in past. S/p treatment with Levaquin and bactrim earlier this month.  Urine culture positive for Ecoli unable to run sensitivities  Treated with IV Ceftriaxone with improvement in symptoms. Will continue on cefpodoxime for 2 more days(7/4-7/5)  Can consider [facility] outpatient       Bradycardia:   6/30 overnight, bradycardic while asleep, hr ranging between 38-45, with intermittent bradycardia during daytime. Asymptomatic.   Metoprolol was held and cardiology was consulted, along with pacemaker interrogation   Pacemaker interrogation found no arrhythmia or low rate events recorded since last interrogation 3/16/23  Per cardiology bradycardia not consistent with normal pacing and capture - metoprolol resumed 7/1  Patient monitored on Tele    Weakness: possibly 2/2 UTI   CTH completed demonstrating no hydrocephalus, acute intracranial hemorrhage, mass effect, or brain edema.   Falls precautions  PT evaluation - recommend home PT  daughter prefer sae   DCP with med rec discussed with Dr Garcia Pt is cleared for DC to [facility]   follow up with pcp out patient     #UTI  #Sinus Bradycardia  #Hyponatremia  #Hyperkalemia

## 2023-07-02 NOTE — PROVIDER CONTACT NOTE (OTHER) - SITUATION
Pt requires urine sample, is incontinent of urine, refusing straight cath
Pt bradycardic while asleep, hr ranging between 38-45. When patient is awoken and alert, hr returns back to 60. Provider made aware

## 2023-07-02 NOTE — DISCHARGE NOTE PROVIDER - DETAILS OF MALNUTRITION DIAGNOSIS/DIAGNOSES
This patient has been assessed with a concern for Malnutrition and was treated during this hospitalization for the following Nutrition diagnosis/diagnoses:     -  06/30/2023: Severe protein-calorie malnutrition

## 2023-07-02 NOTE — PROGRESS NOTE ADULT - SUBJECTIVE AND OBJECTIVE BOX
[facility] Division of Hospital Medicine  Bell Eden MD  Available via MS Teams  Pager: [phone]    SUBJECTIVE / OVERNIGHT EVENTS: Patient seen and examined at bedside. No acute overnight events. Pt denies dyspnea, chest pain fevers, chills, cough, HA, dizziness, syncope, chest palpitations, abd pain, n/v/d, urinary or bowel changes, active sites of bleeding or any other symptoms at this time.    ADDITIONAL REVIEW OF SYSTEMS: negative     MEDICATIONS  (STANDING):  aspirin enteric coated 81 milliGRAM(s) Oral daily  atorvastatin 80 milliGRAM(s) Oral at bedtime  cefTRIAXone   IVPB 1000 milliGRAM(s) IV Intermittent every 24 hours  folic acid 1 milliGRAM(s) Oral daily  levETIRAcetam 500 milliGRAM(s) Oral two times a day  metoprolol tartrate 25 milliGRAM(s) Oral two times a day  pantoprazole    Tablet 40 milliGRAM(s) Oral before breakfast    MEDICATIONS  (PRN):  acetaminophen     Tablet .. 650 milliGRAM(s) Oral every 6 hours PRN Temp greater or equal to 38C (100.4F), Mild Pain (1 - 3)  melatonin 3 milliGRAM(s) Oral at bedtime PRN Insomnia  ondansetron Injectable 4 milliGRAM(s) IV Push every 8 hours PRN Nausea and/or Vomiting      I&O's Summary    01 Jul 2023 07:01  -  02 Jul 2023 07:00  --------------------------------------------------------  IN: 0 mL / OUT: 0 mL / NET: 0 mL        PHYSICAL EXAM:  Vital Signs Last 24 Hrs  T(C): 36.6 (02 Jul 2023 05:44), Max: 36.8 (01 Jul 2023 15:45)  T(F): 97.8 (02 Jul 2023 05:44), Max: 98.2 (01 Jul 2023 15:45)  HR: 62 (02 Jul 2023 05:50) (51 - 62)  BP: 124/82 (02 Jul 2023 05:44) (120/76 - 125/69)  BP(mean): --  RR: 18 (02 Jul 2023 05:44) (18 - 18)  SpO2: 100% (02 Jul 2023 05:44) (97% - 100%)    Parameters below as of 02 Jul 2023 05:44  Patient On (Oxygen Delivery Method): room air        CONSTITUTIONAL: frail elderly male   EYES: PERRLA; conjunctiva and sclera clear  ENMT: Moist oral mucosa, no pharyngeal injection or exudates; normal dentition  NECK: Supple, no palpable masses; no thyromegaly  RESPIRATORY: Normal respiratory effort; lungs are clear to auscultation bilaterally  CARDIOVASCULAR: Regular rate and rhythm, normal S1 and S2, no murmur/rub/gallop; No lower extremity edema; Peripheral pulses are 2+ bilaterally  ABDOMEN: Nontender to palpation, normoactive bowel sounds, no rebound/guarding; No hepatosplenomegaly  MUSCULOSKELETAL:   no clubbing or cyanosis of digits; no joint swelling or tenderness to palpation  PSYCH: A+O to person, place, and time; affect appropriate  NEUROLOGY: CN 2-12 are intact and symmetric; no gross sensory deficits   SKIN: No rashes; no palpable lesions    LABS:    07-02    136  |  102  |  36<H>  ----------------------------<  141<H>  4.7   |  23  |  2.09<H>    Ca    9.8      02 Jul 2023 13:00            Urinalysis Basic - ( 02 Jul 2023 13:00 )    Color: x / Appearance: x / SG: x / pH: x  Gluc: 141 mg/dL / Ketone: x  / Bili: x / Urobili: x   Blood: x / Protein: x / Nitrite: x   Leuk Esterase: x / RBC: x / WBC x   Sq Epi: x / Non Sq Epi: x / Bacteria: x            RADIOLOGY & ADDITIONAL TESTS:  New Results Reviewed Today:   New Imaging Personally Reviewed Today:  New Electrocardiogram Personally Reviewed Today:  Prior or Outpatient Records Reviewed Today:    COMMUNICATION:  Care Discussed with Consultants/Other Providers and Details of Discussion:  Discussions with Patient/Family:  PCP Communication:     [facility] Division of Hospital Medicine  Bell Eden MD  Available via MS Teams  Pager: [phone]    SUBJECTIVE / OVERNIGHT EVENTS: Patient seen and examined at bedside. No acute overnight events. Daughter at bedside, states patient has been complaining of R leg pain. Otherwise denies dyspnea, chest pain fevers, chills, cough, HA, dizziness, syncope, chest palpitations, abd pain, n/v/d, urinary or bowel changes, active sites of bleeding or any other symptoms at this time.    ADDITIONAL REVIEW OF SYSTEMS: +R leg pain     MEDICATIONS  (STANDING):  aspirin enteric coated 81 milliGRAM(s) Oral daily  atorvastatin 80 milliGRAM(s) Oral at bedtime  cefTRIAXone   IVPB 1000 milliGRAM(s) IV Intermittent every 24 hours  folic acid 1 milliGRAM(s) Oral daily  levETIRAcetam 500 milliGRAM(s) Oral two times a day  metoprolol tartrate 25 milliGRAM(s) Oral two times a day  pantoprazole    Tablet 40 milliGRAM(s) Oral before breakfast    MEDICATIONS  (PRN):  acetaminophen     Tablet .. 650 milliGRAM(s) Oral every 6 hours PRN Temp greater or equal to 38C (100.4F), Mild Pain (1 - 3)  melatonin 3 milliGRAM(s) Oral at bedtime PRN Insomnia  ondansetron Injectable 4 milliGRAM(s) IV Push every 8 hours PRN Nausea and/or Vomiting      I&O's Summary    01 Jul 2023 07:01  -  02 Jul 2023 07:00  --------------------------------------------------------  IN: 0 mL / OUT: 0 mL / NET: 0 mL        PHYSICAL EXAM:  Vital Signs Last 24 Hrs  T(C): 36.6 (02 Jul 2023 05:44), Max: 36.8 (01 Jul 2023 15:45)  T(F): 97.8 (02 Jul 2023 05:44), Max: 98.2 (01 Jul 2023 15:45)  HR: 62 (02 Jul 2023 05:50) (51 - 62)  BP: 124/82 (02 Jul 2023 05:44) (120/76 - 125/69)  BP(mean): --  RR: 18 (02 Jul 2023 05:44) (18 - 18)  SpO2: 100% (02 Jul 2023 05:44) (97% - 100%)    Parameters below as of 02 Jul 2023 05:44  Patient On (Oxygen Delivery Method): room air        CONSTITUTIONAL: frail elderly male   EYES: PERRLA; conjunctiva and sclera clear  ENMT: Moist oral mucosa, no pharyngeal injection or exudates; normal dentition  NECK: Supple, no palpable masses; no thyromegaly  RESPIRATORY: Normal respiratory effort; lungs are clear to auscultation bilaterally  CARDIOVASCULAR: Regular rate and rhythm, normal S1 and S2, no murmur/rub/gallop; No lower extremity edema; Peripheral pulses are 2+ bilaterally  ABDOMEN: Nontender to palpation, normoactive bowel sounds, no rebound/guarding; No hepatosplenomegaly  MUSCULOSKELETAL:   no clubbing or cyanosis of digits; no joint swelling or tenderness to palpation, no swelling of LE  PSYCH: A+O to person, place, and time; affect appropriate  NEUROLOGY: CN 2-12 are intact and symmetric; no gross sensory deficits   SKIN: No rashes; no palpable lesions    LABS:    07-02    136  |  102  |  36<H>  ----------------------------<  141<H>  4.7   |  23  |  2.09<H>    Ca    9.8      02 Jul 2023 13:00            Urinalysis Basic - ( 02 Jul 2023 13:00 )    Color: x / Appearance: x / SG: x / pH: x  Gluc: 141 mg/dL / Ketone: x  / Bili: x / Urobili: x   Blood: x / Protein: x / Nitrite: x   Leuk Esterase: x / RBC: x / WBC x   Sq Epi: x / Non Sq Epi: x / Bacteria: x            RADIOLOGY & ADDITIONAL TESTS:  New Results Reviewed Today:   New Imaging Personally Reviewed Today:  New Electrocardiogram Personally Reviewed Today:  Prior or Outpatient Records Reviewed Today:    COMMUNICATION:  Care Discussed with Consultants/Other Providers and Details of Discussion:  Discussions with Patient/Family: Updated family at bedside.   PCP Communication:

## 2023-07-02 NOTE — DISCHARGE NOTE PROVIDER - NSDCCPCAREPLAN_GEN_ALL_CORE_FT
PRINCIPAL DISCHARGE DIAGNOSIS  Diagnosis: Urinary tract infection  Assessment and Plan of Treatment:      PRINCIPAL DISCHARGE DIAGNOSIS  Diagnosis: Urinary tract infection  Assessment and Plan of Treatment: s/p 5 day of antibiotics   follow up with pcp as needed

## 2023-07-02 NOTE — PROVIDER CONTACT NOTE (OTHER) - ASSESSMENT
Pt A&OX2-3. Denies symptoms.
Pt confused, verbally aggressive, refusing care, ordering RN to leave the room.

## 2023-07-02 NOTE — DISCHARGE NOTE PROVIDER - NSDCMRMEDTOKEN_GEN_ALL_CORE_FT
Allegra 24 Hour Allergy oral tablet: 1 orally  aspirin 81 mg oral tablet: 1 orally once a day  atorvastatin 80 mg oral tablet: 1 orally once a day (at bedtime)  folic acid 1 mg oral tablet: 1 orally once a day  Keppra 500 mg oral tablet: 1 orally 2 times a day  metoprolol tartrate 25 mg oral tablet: 1 orally 2 times a day  omeprazole 20 mg oral delayed release capsule: 1 orally once a day   Allegra 24 Hour Allergy oral tablet: 1 orally  aspirin 81 mg oral tablet: 1 orally once a day  atorvastatin 80 mg oral tablet: 1 orally once a day (at bedtime)  cefpodoxime 200 mg oral tablet: 1 tab(s) orally 2 times a day  folic acid 1 mg oral tablet: 1 orally once a day  Keppra 500 mg oral tablet: 1 orally 2 times a day  metoprolol tartrate 25 mg oral tablet: 1 orally 2 times a day  omeprazole 20 mg oral delayed release capsule: 1 orally once a day

## 2023-07-02 NOTE — PROGRESS NOTE ADULT - PROBLEM SELECTOR PLAN 4
Possibly from UTI. Given it seems to have been ongoing for 1 month despite multiple courses of antibiotics for UTI    -CTH: No hydrocephalus, acute intracranial hemorrhage, mass effect, or brain edema.   -Falls precautions  -PT: home PT Possibly from UTI. Given it seems to have been ongoing for 1 month despite multiple courses of antibiotics for UTI    -CTH: No hydrocephalus, acute intracranial hemorrhage, mass effect, or brain edema.   -Falls precautions  -PT: home PT    #R leg pain   -Nontender, no swelling or ecchymosis elicited on exam   -F/u with Doppler of B/L LE   -F/u with X-ray of RLE

## 2023-07-02 NOTE — PROGRESS NOTE ADULT - SUBJECTIVE AND OBJECTIVE BOX
HPI:  89M w/ hx of CAD (s/p CABG), s/p PPM, CHF, seizures, CKD, HTN, anemia, UTI, presented initially with weakness, found to have UTI, admitted to medicine for further care. Cardiology consulted on 6/30 for bradycardia noted on vital sign checks.    On interview with me, pt denies noticing the bradycardia, denies feeling palpitations, light-headed/dizzy/fainting, chest pain, sob, orthopnea/pnd, LE swelling.    ===========================  Interval Events  -   -   - No reported worsening CP/Palps/SOB\  ===========================        PAST MEDICAL & SURGICAL HISTORY:  CHF, chronic      CAD (coronary artery disease)      Essential hypertension      FH: CABG (coronary artery bypass surgery)        FAMILY HISTORY:    SOCIAL HISTORY:  unchanged    MEDICATIONS:  aspirin enteric coated 81 milliGRAM(s) Oral daily    cefTRIAXone   IVPB 1000 milliGRAM(s) IV Intermittent every 24 hours      acetaminophen     Tablet .. 650 milliGRAM(s) Oral every 6 hours PRN  levETIRAcetam 500 milliGRAM(s) Oral two times a day  melatonin 3 milliGRAM(s) Oral at bedtime PRN  ondansetron Injectable 4 milliGRAM(s) IV Push every 8 hours PRN    pantoprazole    Tablet 40 milliGRAM(s) Oral before breakfast    atorvastatin 80 milliGRAM(s) Oral at bedtime    folic acid 1 milliGRAM(s) Oral daily      REVIEW OF SYSTEMS:  CV: chest pain (-), palpitation (-), orthopnea (-), PND (-), edema (-)  PULM: SOB (-), cough (-), wheezing (-), hemoptysis (-).   CONST: fever (-), chills (-) or fatigue (-)  GI: abdominal distension (-), abdominal pain (-) , nausea/vomiting (-), hematemesis, (-), melena (-), hematochezia (-)  : dysuria (-), frequency (-), hematuria (-).   NEURO: numbness (-), weakness (-), dizziness (-)  SKIN: itching (-), rash (-)  HEENT:  visual changes (-); vertigo or throat pain (-);  neck stiffness (-)     All other review of systems is negative unless indicated above.   -------------------------------------------------------------------------------------------  PHYSICAL EXAM:  T(C): 37.4 (06-30-23 @ 16:18), Max: 37.4 (06-30-23 @ 16:18)  HR: 60 (06-30-23 @ 16:18) (36 - 60)  BP: 111/63 (06-30-23 @ 16:18) (111/63 - 129/60)  RR: 18 (06-30-23 @ 16:18) (18 - 18)  SpO2: 99% (06-30-23 @ 16:18) (94% - 99%)  Wt(kg): --  I&O's Summary    29 Jun 2023 07:01  -  30 Jun 2023 07:00  --------------------------------------------------------  IN: 200 mL / OUT: 0 mL / NET: 200 mL        GENERAL: NAD  HEAD: Atraumatic, Normocephalic.  EYES: EOMI, PERRLA, conjunctiva and sclera clear.  ENT: Moist mucous membranes.  NECK: Supple, No JVD.  CHEST/LUNG: Clear to auscultation bilaterally; No rales, rhonchi, wheezing, or rubs. Unlabored respirations.  HEART: Regular rate and rhythm; No murmurs, rubs, or gallops.  ABDOMEN: Bowel sounds present; Soft, Nontender, Nondistended.   EXTREMITIES:  2+ Peripheral Pulses, brisk capillary refill. No clubbing, cyanosis, or edema.    -------------------------------------------------------------------------------------------  LABS:                          13.0   8.00  )-----------( 273      ( 29 Jun 2023 07:16 )             41.6     06-30    135  |  104  |  41<H>  ----------------------------<  79  5.6<H>   |  16<L>  |  1.87<H>    Ca    9.6      30 Jun 2023 10:27  Mg     2.2     06-29    TPro  7.1  /  Alb  3.3  /  TBili  0.6  /  DBili  x   /  AST  22  /  ALT  14  /  AlkPhos  143<H>  06-29          cefTRIAXone   IVPB 1000 milliGRAM(s) IV Intermittent every 24 hours      acetaminophen     Tablet .. 650 milliGRAM(s) Oral every 6 hours PRN  levETIRAcetam 500 milliGRAM(s) Oral two times a day  melatonin 3 milliGRAM(s) Oral at bedtime PRN  ondansetron Injectable 4 milliGRAM(s) IV Push every 8 hours PRN          -------------------------------------------------------------------------------------------                      Assessment and Recommendation:   · Assessment	  89M w/ hx of CAD (s/p CABG), s/p PPM, CHF, seizures, CKD, HTN, anemia, UTI, presented initially with weakness, found to have UTI, admitted to medicine for further care. Cardiology consulted on 6/30 for bradycardia noted on vital sign checks.    #Possible bradycardia:  - EKG reviewed showing V-paced rhythm at 60bpm  - PPM interrogated showing functioning well, no recorded bradycardia episodes on device  - discussed the bradycardia on vital sign flowsheets with nurse, nurse reports the heart rate was likely obtained from the pulse ox which earlier in the shift was not reliable and had to be changed, when nurse palpated pulse it was at 60bpm  - agree with placing pt on telemetry for closer monitoring  - pt remains asymptomatic from cardiovascular standpoint  - c/w aspirin, atorva  - if no bradycardia confirmed can resume home metoprolol    Ok to d/c

## 2023-07-03 VITALS
HEART RATE: 77 BPM | RESPIRATION RATE: 18 BRPM | SYSTOLIC BLOOD PRESSURE: 131 MMHG | OXYGEN SATURATION: 98 % | DIASTOLIC BLOOD PRESSURE: 56 MMHG | TEMPERATURE: 98 F

## 2023-07-03 LAB — UUN UR-MCNC: 456 MG/DL — SIGNIFICANT CHANGE UP

## 2023-07-03 PROCEDURE — 84133 ASSAY OF URINE POTASSIUM: CPT

## 2023-07-03 PROCEDURE — 82570 ASSAY OF URINE CREATININE: CPT

## 2023-07-03 PROCEDURE — 80053 COMPREHEN METABOLIC PANEL: CPT

## 2023-07-03 PROCEDURE — 85018 HEMOGLOBIN: CPT

## 2023-07-03 PROCEDURE — 36415 COLL VENOUS BLD VENIPUNCTURE: CPT

## 2023-07-03 PROCEDURE — 36000 PLACE NEEDLE IN VEIN: CPT

## 2023-07-03 PROCEDURE — 84300 ASSAY OF URINE SODIUM: CPT

## 2023-07-03 PROCEDURE — 93970 EXTREMITY STUDY: CPT | Mod: 26

## 2023-07-03 PROCEDURE — 83605 ASSAY OF LACTIC ACID: CPT

## 2023-07-03 PROCEDURE — 83935 ASSAY OF URINE OSMOLALITY: CPT

## 2023-07-03 PROCEDURE — 82330 ASSAY OF CALCIUM: CPT

## 2023-07-03 PROCEDURE — 84132 ASSAY OF SERUM POTASSIUM: CPT

## 2023-07-03 PROCEDURE — 93005 ELECTROCARDIOGRAM TRACING: CPT

## 2023-07-03 PROCEDURE — 81001 URINALYSIS AUTO W/SCOPE: CPT

## 2023-07-03 PROCEDURE — 70450 CT HEAD/BRAIN W/O DYE: CPT

## 2023-07-03 PROCEDURE — 73590 X-RAY EXAM OF LOWER LEG: CPT | Mod: 26,RT

## 2023-07-03 PROCEDURE — 71045 X-RAY EXAM CHEST 1 VIEW: CPT

## 2023-07-03 PROCEDURE — 82803 BLOOD GASES ANY COMBINATION: CPT

## 2023-07-03 PROCEDURE — 80048 BASIC METABOLIC PNL TOTAL CA: CPT

## 2023-07-03 PROCEDURE — 84540 ASSAY OF URINE/UREA-N: CPT

## 2023-07-03 PROCEDURE — 76770 US EXAM ABDO BACK WALL COMP: CPT

## 2023-07-03 PROCEDURE — 73590 X-RAY EXAM OF LOWER LEG: CPT

## 2023-07-03 PROCEDURE — 82947 ASSAY GLUCOSE BLOOD QUANT: CPT

## 2023-07-03 PROCEDURE — 84295 ASSAY OF SERUM SODIUM: CPT

## 2023-07-03 PROCEDURE — 85014 HEMATOCRIT: CPT

## 2023-07-03 PROCEDURE — 84100 ASSAY OF PHOSPHORUS: CPT

## 2023-07-03 PROCEDURE — 93970 EXTREMITY STUDY: CPT

## 2023-07-03 PROCEDURE — 99285 EMERGENCY DEPT VISIT HI MDM: CPT

## 2023-07-03 PROCEDURE — 87086 URINE CULTURE/COLONY COUNT: CPT

## 2023-07-03 PROCEDURE — 83735 ASSAY OF MAGNESIUM: CPT

## 2023-07-03 PROCEDURE — 85025 COMPLETE CBC W/AUTO DIFF WBC: CPT

## 2023-07-03 PROCEDURE — 84156 ASSAY OF PROTEIN URINE: CPT

## 2023-07-03 PROCEDURE — 82962 GLUCOSE BLOOD TEST: CPT

## 2023-07-03 PROCEDURE — 87186 SC STD MICRODIL/AGAR DIL: CPT

## 2023-07-03 PROCEDURE — 82435 ASSAY OF BLOOD CHLORIDE: CPT

## 2023-07-03 PROCEDURE — 99232 SBSQ HOSP IP/OBS MODERATE 35: CPT

## 2023-07-03 PROCEDURE — 99233 SBSQ HOSP IP/OBS HIGH 50: CPT | Mod: GC

## 2023-07-03 PROCEDURE — 87077 CULTURE AEROBIC IDENTIFY: CPT

## 2023-07-03 RX ORDER — CEFPODOXIME PROXETIL 100 MG
1 TABLET ORAL
Qty: 4 | Refills: 0
Start: 2023-07-03 | End: 2023-07-04

## 2023-07-03 RX ADMIN — CEFTRIAXONE 100 MILLIGRAM(S): 500 INJECTION, POWDER, FOR SOLUTION INTRAMUSCULAR; INTRAVENOUS at 00:38

## 2023-07-03 RX ADMIN — Medication 81 MILLIGRAM(S): at 12:36

## 2023-07-03 RX ADMIN — Medication 1 MILLIGRAM(S): at 12:36

## 2023-07-03 RX ADMIN — LEVETIRACETAM 500 MILLIGRAM(S): 250 TABLET, FILM COATED ORAL at 05:42

## 2023-07-03 RX ADMIN — PANTOPRAZOLE SODIUM 40 MILLIGRAM(S): 20 TABLET, DELAYED RELEASE ORAL at 05:42

## 2023-07-03 NOTE — PROGRESS NOTE ADULT - PROBLEM SELECTOR PLAN 8
Will sometimes take lasix at home with he gains weight. Family had increased dose until beginning of this month. s/p PPM. Currently appears relatively euvolemic  -Now off losartan and spironolactone  -HOLD Metoprolol in setting of bradycardia
Will sometimes take lasix at home with he gains weight. Family had increased dose until beginning of this month. s/p PPM. Currently appears relatively euvolemic  -Now off losartan and spironolactone  -Restarted Metoprolol, cleared by cardio

## 2023-07-03 NOTE — PROGRESS NOTE ADULT - PROBLEM SELECTOR PROBLEM 5
Hyperkalemia
Hyperkalemia
Acute renal failure superimposed on stage 3 chronic kidney disease
Acute renal failure superimposed on stage 3 chronic kidney disease

## 2023-07-03 NOTE — PROGRESS NOTE ADULT - PROBLEM SELECTOR PROBLEM 8
Congestive heart failure (CHF)

## 2023-07-03 NOTE — PROGRESS NOTE ADULT - PROBLEM SELECTOR PLAN 10
-Lovenox for now
-Lovenox for now
-Lovenox for now    Dispo: Pending PT reeval, family requesting ANCELMO
-Lovenox for now, stop if CTH shows hemorrhage

## 2023-07-03 NOTE — PROGRESS NOTE ADULT - PROBLEM SELECTOR PLAN 5
Baseline crt 1.5 in prior records. Pre-renal in nature? Also hx of kidney stones as per daughter  -Trend BMP  -Renal dose medications  -Kidney and bladder US: Right kidney moderate hydronephrosis, with bilateral ureteral jets   visualized in the urinary bladder
RESOLVED. K 5.5 maybe in setting of SOPHY. Reportedly has not taken ARB or spironolactone for 2 weeks.  -Trend BMP, Mg  -s/p Lokelma
RESOLVED. K 5.5 maybe in setting of SOPHY. Reportedly has not taken ARB or spironolactone for 2 weeks.  -Trend BMP, Mg  -s/p Lokelma
Baseline crt 1.5 in prior records. Pre-renal in nature? Also hx of kidney stones as per daughter  -Trend BMP  -Renal dose medications  -F/u Kidney and bladder US

## 2023-07-03 NOTE — PROGRESS NOTE ADULT - REASON FOR ADMISSION
Weakness and UTI

## 2023-07-03 NOTE — PROGRESS NOTE ADULT - PROBLEM SELECTOR PLAN 7
S/p CABG 1991  -Cont. asa  -Cont. atorvastatin QHS

## 2023-07-03 NOTE — PROGRESS NOTE ADULT - SUBJECTIVE AND OBJECTIVE BOX
HPI:  89M w/ hx of CAD (s/p CABG), s/p PPM, CHF, seizures, CKD, HTN, anemia, UTI, presented initially with weakness, found to have UTI, admitted to medicine for further care. Cardiology consulted on 6/30 for bradycardia noted on vital sign checks.    On interview with me, pt denies noticing the bradycardia, denies feeling palpitations, light-headed/dizzy/fainting, chest pain, sob, orthopnea/pnd, LE swelling.    ===========================  Interval Events  -   - BPs lower   - No reported worsening CP/Palps/SOB\  ===========================        PAST MEDICAL & SURGICAL HISTORY:  CHF, chronic      CAD (coronary artery disease)      Essential hypertension      FH: CABG (coronary artery bypass surgery)        FAMILY HISTORY:    SOCIAL HISTORY:  unchanged    MEDICATIONS:  aspirin enteric coated 81 milliGRAM(s) Oral daily    cefTRIAXone   IVPB 1000 milliGRAM(s) IV Intermittent every 24 hours      acetaminophen     Tablet .. 650 milliGRAM(s) Oral every 6 hours PRN  levETIRAcetam 500 milliGRAM(s) Oral two times a day  melatonin 3 milliGRAM(s) Oral at bedtime PRN  ondansetron Injectable 4 milliGRAM(s) IV Push every 8 hours PRN    pantoprazole    Tablet 40 milliGRAM(s) Oral before breakfast    atorvastatin 80 milliGRAM(s) Oral at bedtime    folic acid 1 milliGRAM(s) Oral daily      REVIEW OF SYSTEMS:  CV: chest pain (-), palpitation (-), orthopnea (-), PND (-), edema (-)  PULM: SOB (-), cough (-), wheezing (-), hemoptysis (-).   CONST: fever (-), chills (-) or fatigue (-)  GI: abdominal distension (-), abdominal pain (-) , nausea/vomiting (-), hematemesis, (-), melena (-), hematochezia (-)  : dysuria (-), frequency (-), hematuria (-).   NEURO: numbness (-), weakness (-), dizziness (-)  SKIN: itching (-), rash (-)  HEENT:  visual changes (-); vertigo or throat pain (-);  neck stiffness (-)     All other review of systems is negative unless indicated above.   -------------------------------------------------------------------------------------------  PHYSICAL EXAM:  T(C): 37.4 (06-30-23 @ 16:18), Max: 37.4 (06-30-23 @ 16:18)  HR: 60 (06-30-23 @ 16:18) (36 - 60)  BP: 111/63 (06-30-23 @ 16:18) (111/63 - 129/60)  RR: 18 (06-30-23 @ 16:18) (18 - 18)  SpO2: 99% (06-30-23 @ 16:18) (94% - 99%)  Wt(kg): --  I&O's Summary    29 Jun 2023 07:01  -  30 Jun 2023 07:00  --------------------------------------------------------  IN: 200 mL / OUT: 0 mL / NET: 200 mL        GENERAL: NAD  HEAD: Atraumatic, Normocephalic.  EYES: EOMI, PERRLA, conjunctiva and sclera clear.  ENT: Moist mucous membranes.  NECK: Supple, No JVD.  CHEST/LUNG: Clear to auscultation bilaterally; No rales, rhonchi, wheezing, or rubs. Unlabored respirations.  HEART: Regular rate and rhythm; No murmurs, rubs, or gallops.  ABDOMEN: Bowel sounds present; Soft, Nontender, Nondistended.   EXTREMITIES:  2+ Peripheral Pulses, brisk capillary refill. No clubbing, cyanosis, or edema.    -------------------------------------------------------------------------------------------  LABS:                          13.0   8.00  )-----------( 273      ( 29 Jun 2023 07:16 )             41.6     06-30    135  |  104  |  41<H>  ----------------------------<  79  5.6<H>   |  16<L>  |  1.87<H>    Ca    9.6      30 Jun 2023 10:27  Mg     2.2     06-29    TPro  7.1  /  Alb  3.3  /  TBili  0.6  /  DBili  x   /  AST  22  /  ALT  14  /  AlkPhos  143<H>  06-29          cefTRIAXone   IVPB 1000 milliGRAM(s) IV Intermittent every 24 hours      acetaminophen     Tablet .. 650 milliGRAM(s) Oral every 6 hours PRN  levETIRAcetam 500 milliGRAM(s) Oral two times a day  melatonin 3 milliGRAM(s) Oral at bedtime PRN  ondansetron Injectable 4 milliGRAM(s) IV Push every 8 hours PRN          -------------------------------------------------------------------------------------------                      Assessment and Recommendation:   · Assessment	  89M w/ hx of CAD (s/p CABG), s/p PPM, CHF, seizures, CKD, HTN, anemia, UTI, presented initially with weakness, found to have UTI, admitted to medicine for further care. Cardiology consulted on 6/30 for bradycardia noted on vital sign checks.    #Possible bradycardia:  No new recs   - EKG reviewed showing V-paced rhythm at 60bpm  - PPM interrogated showing functioning well, no recorded bradycardia episodes on device  - discussed the bradycardia on vital sign flowsheets with nurse, nurse reports the heart rate was likely obtained from the pulse ox which earlier in the shift was not reliable and had to be changed, when nurse palpated pulse it was at 60bpm  - agree with placing pt on telemetry for closer monitoring  - pt remains asymptomatic from cardiovascular standpoint  - c/w aspirin, atorva  - if no bradycardia confirmed can resume home metoprolol    Ok to d/c

## 2023-07-03 NOTE — PROGRESS NOTE ADULT - PROBLEM SELECTOR PLAN 1
UA could be considered positive. Hx of UTIs in past. S/p treatment with Levaquin and bactrim earlier this month.  -Cont. IV Ceftriaxone for now  -UCx positive for Ecoli, pending Abx sensitivity   -Given hx of prior UTIs, low threshold to broaden if concern increases
Worsening, Baseline crt 1.5 in prior records. Pre-renal in nature? Also hx of kidney stones as per daughter  -Trend BMP  -Renal dose medications  -Kidney and bladder US: Right kidney moderate hydronephrosis, with bilateral ureteral jets   visualized in the urinary bladder  -Bladder scan to monitor for retention, might need cornejo, however patient declining straight cath   -F/u with urine studies   -Nephrology consulted for further recs
Worsening, Baseline crt 1.5 in prior records. Pre-renal in nature? Also hx of kidney stones as per daughter  -Trend BMP  -Renal dose medications  -Kidney and bladder US: Right kidney moderate hydronephrosis, with bilateral ureteral jets   visualized in the urinary bladder  -Bladder scan to monitor for retention, might need cornejo, however patient declining straight cath
UA could be considered positive. Hx of UTIs in past. S/p treatment with Levaquin and bactrim earlier this month.  -Cont. IV Ceftriaxone for now  -F/u UCx  -Given hx of prior UTIs, low threshold to broaden if concern increases

## 2023-07-03 NOTE — PROGRESS NOTE ADULT - PROBLEM SELECTOR PLAN 4
Possibly from UTI. Given it seems to have been ongoing for 1 month despite multiple courses of antibiotics for UTI    -CTH: No hydrocephalus, acute intracranial hemorrhage, mass effect, or brain edema.   -Falls precautions  -PT: home PT, reevaluating for ANCELMO    #R leg pain   -Nontender, no swelling or ecchymosis elicited on exam   -F/u with Doppler of B/L LE   -F/u with X-ray of RLE

## 2023-07-03 NOTE — PROGRESS NOTE ADULT - PROBLEM SELECTOR PLAN 2
Overnight, bradycardic while asleep, hr ranging between 38-45, with intermittent bradycardia during daytime. Asymptomatic.   -Cardio consulted: Bradycardia not consistent with normal pacing and capture, restarted metoprolol  -Tele monitoring
UA could be considered positive. Hx of UTIs in past. S/p treatment with Levaquin and bactrim earlier this month.  -Cont. IV Ceftriaxone for now  -UCx positive for Ecoli, pending Abx sensitivity   -Given hx of prior UTIs, low threshold to broaden if concern increases
UA could be considered positive. Hx of UTIs in past. S/p treatment with Levaquin and bactrim earlier this month.  -Cont. IV Ceftriaxone for now  -UCx positive for Ecoli, unable to run sensitivities *2, symptomatically improved  -Given hx of prior UTIs, low threshold to broaden if concern increases
Overnight, bradycardic while asleep, hr ranging between 38-45, with intermittent bradycardia during daytime. Asymptomatic.   -Will hold all AV yumiko blockers   -Correct electrolytes   -F/u on STAT ECG   -If patient continues to be bradycardic, will consult house cardio   -Tele monitoring

## 2023-07-03 NOTE — PROGRESS NOTE ADULT - PROBLEM SELECTOR PLAN 3
IMPROVED. 6/30 bradycardic while asleep, hr ranging between 38-45, with intermittent bradycardia during daytime. Asymptomatic.   -Cardio consulted: Bradycardia not consistent with normal pacing and capture, restarted metoprolol  -Tele monitoring
Possibly from UTI. Given it seems to have been ongoing for 1 month despite multiple courses of antibiotics for UTI    -CTH: No hydrocephalus, acute intracranial hemorrhage, mass effect, or brain edema.   -Falls precautions  -PT: home PT
IMPROVED. 6/30 bradycardic while asleep, hr ranging between 38-45, with intermittent bradycardia during daytime. Asymptomatic.   -Cardio consulted: Bradycardia not consistent with normal pacing and capture, restarted metoprolol  -Tele monitoring
Possibly from UTI. Given it seems to have been ongoing for 1 month despite multiple courses of antibiotics for UTI will order CTH as well given hx of SDH in past. Not on AC  -Falls precautions  -PT consult  -Will order CTH non-con

## 2023-07-03 NOTE — PROGRESS NOTE ADULT - NUTRITIONAL ASSESSMENT
This patient has been assessed with a concern for Malnutrition and has been determined to have a diagnosis/diagnoses of Severe protein-calorie malnutrition.    This patient is being managed with:   Diet Regular-  No Concentrated Potassium  Low Sodium  Entered: Jun 30 2023  3:01PM  

## 2023-07-03 NOTE — PROGRESS NOTE ADULT - SUBJECTIVE AND OBJECTIVE BOX
[facility] Division of Hospital Medicine  Don Garcia  Pager (M-F, 8A-5P): [phone]  Other Times:  [phone]      SUBJECTIVE / OVERNIGHT EVENTS:  No acute events overnight  discussed with daughter and hha bedside  At baseline mental status  + Knee pain  denies f/chills    ADDITIONAL REVIEW OF SYSTEMS:    MEDICATIONS  (STANDING):  aspirin enteric coated 81 milliGRAM(s) Oral daily  atorvastatin 80 milliGRAM(s) Oral at bedtime  cefTRIAXone   IVPB 1000 milliGRAM(s) IV Intermittent every 24 hours  folic acid 1 milliGRAM(s) Oral daily  levETIRAcetam 500 milliGRAM(s) Oral two times a day  metoprolol tartrate 25 milliGRAM(s) Oral two times a day  pantoprazole    Tablet 40 milliGRAM(s) Oral before breakfast    MEDICATIONS  (PRN):  acetaminophen     Tablet .. 650 milliGRAM(s) Oral every 6 hours PRN Temp greater or equal to 38C (100.4F), Mild Pain (1 - 3)  melatonin 3 milliGRAM(s) Oral at bedtime PRN Insomnia  ondansetron Injectable 4 milliGRAM(s) IV Push every 8 hours PRN Nausea and/or Vomiting      I&O's Summary    2023 07:01  -  2023 07:00  --------------------------------------------------------  IN: 0 mL / OUT: 150 mL / NET: -150 mL        PHYSICAL EXAM:  Vital Signs Last 24 Hrs  T(C): 36.3 (2023 23:51), Max: 37.4 (2023 14:22)  T(F): 97.3 (2023 23:51), Max: 99.4 (2023 14:22)  HR: 81 (2023 05:39) (60 - 81)  BP: 119/49 (2023 05:39) (85/52 - 119/49)  BP(mean): --  RR: 18 (2023 23:51) (18 - 18)  SpO2: 100% (2023 23:51) (97% - 100%)    Parameters below as of 2023 23:51  Patient On (Oxygen Delivery Method): room air      CONSTITUTIONAL: frail elderly  ENMT: Moist oral mucosa, no pharyngeal injection or exudates  NECK: Supple, no palpable masses  RESPIRATORY: Normal respiratory effort; lungs are clear to auscultation bilaterally  CARDIOVASCULAR: Regular rate and rhythm, normal S1 and S2, no murmur; No lower extremity edema; Peripheral pulses are 2+ bilaterally  ABDOMEN: Nontender to palpation, normoactive bowel sounds  MUSCULOSKELETAL:no clubbing or cyanosis of digits; no joint swelling or tenderness to palpation  PSYCH: A+O to person, place, and time; affect appropriate  NEUROLOGY: CN 2-12 are intact and symmetric; no gross sensory deficits   SKIN: No rashes; no palpable lesions    LABS:        136  |  102  |  36<H>  ----------------------------<  141<H>  4.7   |  23  |  2.09<H>    Ca    9.8      2023 13:00            Urinalysis Basic - ( 2023 14:50 )    Color: DARK BROWN / Appearance: Turbid / S.012 / pH: x  Gluc: x / Ketone: Negative  / Bili: Negative / Urobili: Negative   Blood: x / Protein: 300 mg/dL / Nitrite: Negative   Leuk Esterase: Large / RBC: 6 /hpf / WBC 2067 /HPF   Sq Epi: x / Non Sq Epi: x / Bacteria: Negative

## 2023-07-03 NOTE — PROGRESS NOTE ADULT - TIME BILLING
chart reviewing, history taking, physical exam, assessment and documentation, including speaking to specialist/SW/CM regarding the management.
reviewing emr, labs, imaging, coordination of care, discussion with patient, daughter, documentation

## 2023-07-04 LAB
-  AMIKACIN: SIGNIFICANT CHANGE UP
-  AMOXICILLIN/CLAVULANIC ACID: SIGNIFICANT CHANGE UP
-  AMPICILLIN/SULBACTAM: SIGNIFICANT CHANGE UP
-  AMPICILLIN: SIGNIFICANT CHANGE UP
-  AZTREONAM: SIGNIFICANT CHANGE UP
-  CEFAZOLIN: SIGNIFICANT CHANGE UP
-  CEFEPIME: SIGNIFICANT CHANGE UP
-  CEFTRIAXONE: SIGNIFICANT CHANGE UP
-  CEFUROXIME: SIGNIFICANT CHANGE UP
-  CIPROFLOXACIN: SIGNIFICANT CHANGE UP
-  ERTAPENEM: SIGNIFICANT CHANGE UP
-  GENTAMICIN: SIGNIFICANT CHANGE UP
-  IMIPENEM: SIGNIFICANT CHANGE UP
-  LEVOFLOXACIN: SIGNIFICANT CHANGE UP
-  MEROPENEM: SIGNIFICANT CHANGE UP
-  NITROFURANTOIN: SIGNIFICANT CHANGE UP
-  PIPERACILLIN/TAZOBACTAM: SIGNIFICANT CHANGE UP
-  TOBRAMYCIN: SIGNIFICANT CHANGE UP
-  TRIMETHOPRIM/SULFAMETHOXAZOLE: SIGNIFICANT CHANGE UP
CULTURE RESULTS: SIGNIFICANT CHANGE UP
METHOD TYPE: SIGNIFICANT CHANGE UP
ORGANISM # SPEC MICROSCOPIC CNT: SIGNIFICANT CHANGE UP
ORGANISM # SPEC MICROSCOPIC CNT: SIGNIFICANT CHANGE UP
SPECIMEN SOURCE: SIGNIFICANT CHANGE UP

## 2023-08-02 ENCOUNTER — INPATIENT (INPATIENT)
Facility: HOSPITAL | Age: 88
LOS: 8 days | Discharge: HOME HEALTH | DRG: 291 | End: 2023-08-11
Attending: HOSPITALIST | Admitting: HOSPITALIST
Payer: MEDICARE

## 2023-08-02 VITALS
WEIGHT: 169.98 LBS | OXYGEN SATURATION: 88 % | HEIGHT: 69 IN | HEART RATE: 67 BPM | TEMPERATURE: 98 F | SYSTOLIC BLOOD PRESSURE: 112 MMHG | RESPIRATION RATE: 17 BRPM | DIASTOLIC BLOOD PRESSURE: 64 MMHG

## 2023-08-02 DIAGNOSIS — I50.9 HEART FAILURE, UNSPECIFIED: ICD-10-CM

## 2023-08-02 DIAGNOSIS — Z82.49 FAMILY HISTORY OF ISCHEMIC HEART DISEASE AND OTHER DISEASES OF THE CIRCULATORY SYSTEM: Chronic | ICD-10-CM

## 2023-08-02 LAB
ALBUMIN SERPL ELPH-MCNC: 2.6 G/DL — LOW (ref 3.3–5)
ALP SERPL-CCNC: 100 U/L — SIGNIFICANT CHANGE UP (ref 40–120)
ALT FLD-CCNC: 10 U/L — SIGNIFICANT CHANGE UP (ref 10–45)
ANION GAP SERPL CALC-SCNC: 12 MMOL/L — SIGNIFICANT CHANGE UP (ref 5–17)
APPEARANCE UR: ABNORMAL
AST SERPL-CCNC: 20 U/L — SIGNIFICANT CHANGE UP (ref 10–40)
BACTERIA # UR AUTO: NEGATIVE — SIGNIFICANT CHANGE UP
BASE EXCESS BLDV CALC-SCNC: -2.3 MMOL/L — LOW (ref -2–3)
BASOPHILS # BLD AUTO: 0.05 K/UL — SIGNIFICANT CHANGE UP (ref 0–0.2)
BASOPHILS NFR BLD AUTO: 0.7 % — SIGNIFICANT CHANGE UP (ref 0–2)
BILIRUB SERPL-MCNC: 0.5 MG/DL — SIGNIFICANT CHANGE UP (ref 0.2–1.2)
BILIRUB UR-MCNC: NEGATIVE — SIGNIFICANT CHANGE UP
BUN SERPL-MCNC: 29 MG/DL — HIGH (ref 7–23)
CA-I SERPL-SCNC: 1.22 MMOL/L — SIGNIFICANT CHANGE UP (ref 1.15–1.33)
CALCIUM SERPL-MCNC: 9 MG/DL — SIGNIFICANT CHANGE UP (ref 8.4–10.5)
CHLORIDE BLDV-SCNC: 111 MMOL/L — HIGH (ref 96–108)
CHLORIDE SERPL-SCNC: 111 MMOL/L — HIGH (ref 96–108)
CO2 BLDV-SCNC: 25 MMOL/L — SIGNIFICANT CHANGE UP (ref 22–26)
CO2 SERPL-SCNC: 20 MMOL/L — LOW (ref 22–31)
COLOR SPEC: YELLOW — SIGNIFICANT CHANGE UP
CREAT SERPL-MCNC: 1.43 MG/DL — HIGH (ref 0.5–1.3)
DIFF PNL FLD: ABNORMAL
EGFR: 45 ML/MIN/1.73M2 — LOW
EOSINOPHIL # BLD AUTO: 0.4 K/UL — SIGNIFICANT CHANGE UP (ref 0–0.5)
EOSINOPHIL NFR BLD AUTO: 5.5 % — SIGNIFICANT CHANGE UP (ref 0–6)
EPI CELLS # UR: 2 /HPF — SIGNIFICANT CHANGE UP
GAS PNL BLDV: 140 MMOL/L — SIGNIFICANT CHANGE UP (ref 136–145)
GAS PNL BLDV: SIGNIFICANT CHANGE UP
GAS PNL BLDV: SIGNIFICANT CHANGE UP
GLUCOSE BLDV-MCNC: 86 MG/DL — SIGNIFICANT CHANGE UP (ref 70–99)
GLUCOSE SERPL-MCNC: 92 MG/DL — SIGNIFICANT CHANGE UP (ref 70–99)
GLUCOSE UR QL: NEGATIVE — SIGNIFICANT CHANGE UP
HCO3 BLDV-SCNC: 24 MMOL/L — SIGNIFICANT CHANGE UP (ref 22–29)
HCT VFR BLD CALC: 32.1 % — LOW (ref 39–50)
HCT VFR BLDA CALC: 30 % — LOW (ref 39–51)
HGB BLD CALC-MCNC: 9.9 G/DL — LOW (ref 12.6–17.4)
HGB BLD-MCNC: 10.2 G/DL — LOW (ref 13–17)
HYALINE CASTS # UR AUTO: 1 /LPF — SIGNIFICANT CHANGE UP (ref 0–2)
IMM GRANULOCYTES NFR BLD AUTO: 1 % — HIGH (ref 0–0.9)
KETONES UR-MCNC: NEGATIVE — SIGNIFICANT CHANGE UP
LACTATE BLDV-MCNC: 1.4 MMOL/L — SIGNIFICANT CHANGE UP (ref 0.5–2)
LEUKOCYTE ESTERASE UR-ACNC: ABNORMAL
LYMPHOCYTES # BLD AUTO: 1.82 K/UL — SIGNIFICANT CHANGE UP (ref 1–3.3)
LYMPHOCYTES # BLD AUTO: 25.2 % — SIGNIFICANT CHANGE UP (ref 13–44)
MCHC RBC-ENTMCNC: 29.5 PG — SIGNIFICANT CHANGE UP (ref 27–34)
MCHC RBC-ENTMCNC: 31.8 GM/DL — LOW (ref 32–36)
MCV RBC AUTO: 92.8 FL — SIGNIFICANT CHANGE UP (ref 80–100)
MONOCYTES # BLD AUTO: 0.53 K/UL — SIGNIFICANT CHANGE UP (ref 0–0.9)
MONOCYTES NFR BLD AUTO: 7.3 % — SIGNIFICANT CHANGE UP (ref 2–14)
NEUTROPHILS # BLD AUTO: 4.36 K/UL — SIGNIFICANT CHANGE UP (ref 1.8–7.4)
NEUTROPHILS NFR BLD AUTO: 60.3 % — SIGNIFICANT CHANGE UP (ref 43–77)
NITRITE UR-MCNC: NEGATIVE — SIGNIFICANT CHANGE UP
NRBC # BLD: 0 /100 WBCS — SIGNIFICANT CHANGE UP (ref 0–0)
NT-PROBNP SERPL-SCNC: HIGH PG/ML (ref 0–300)
PCO2 BLDV: 45 MMHG — SIGNIFICANT CHANGE UP (ref 42–55)
PH BLDV: 7.33 — SIGNIFICANT CHANGE UP (ref 7.32–7.43)
PH UR: 6 — SIGNIFICANT CHANGE UP (ref 5–8)
PLATELET # BLD AUTO: 232 K/UL — SIGNIFICANT CHANGE UP (ref 150–400)
PO2 BLDV: 22 MMHG — LOW (ref 25–45)
POTASSIUM BLDV-SCNC: 4.7 MMOL/L — SIGNIFICANT CHANGE UP (ref 3.5–5.1)
POTASSIUM SERPL-MCNC: 4.5 MMOL/L — SIGNIFICANT CHANGE UP (ref 3.5–5.3)
POTASSIUM SERPL-SCNC: 4.5 MMOL/L — SIGNIFICANT CHANGE UP (ref 3.5–5.3)
PROT SERPL-MCNC: 6.7 G/DL — SIGNIFICANT CHANGE UP (ref 6–8.3)
PROT UR-MCNC: ABNORMAL
RBC # BLD: 3.46 M/UL — LOW (ref 4.2–5.8)
RBC # FLD: 15.2 % — HIGH (ref 10.3–14.5)
RBC CASTS # UR COMP ASSIST: 2 /HPF — SIGNIFICANT CHANGE UP (ref 0–4)
SAO2 % BLDV: 19.3 % — LOW (ref 67–88)
SODIUM SERPL-SCNC: 143 MMOL/L — SIGNIFICANT CHANGE UP (ref 135–145)
SP GR SPEC: 1.02 — SIGNIFICANT CHANGE UP (ref 1.01–1.02)
TROPONIN T, HIGH SENSITIVITY RESULT: 95 NG/L — HIGH (ref 0–51)
UROBILINOGEN FLD QL: NEGATIVE — SIGNIFICANT CHANGE UP
WBC # BLD: 7.23 K/UL — SIGNIFICANT CHANGE UP (ref 3.8–10.5)
WBC # FLD AUTO: 7.23 K/UL — SIGNIFICANT CHANGE UP (ref 3.8–10.5)
WBC UR QL: >50

## 2023-08-02 PROCEDURE — 36000 PLACE NEEDLE IN VEIN: CPT

## 2023-08-02 PROCEDURE — 71250 CT THORAX DX C-: CPT | Mod: 26,MA

## 2023-08-02 PROCEDURE — 99285 EMERGENCY DEPT VISIT HI MDM: CPT | Mod: FS,25

## 2023-08-02 RX ORDER — FUROSEMIDE 40 MG
80 TABLET ORAL DAILY
Refills: 0 | Status: DISCONTINUED | OUTPATIENT
Start: 2023-08-02 | End: 2023-08-02

## 2023-08-02 RX ORDER — FUROSEMIDE 40 MG
40 TABLET ORAL ONCE
Refills: 0 | Status: COMPLETED | OUTPATIENT
Start: 2023-08-02 | End: 2023-08-02

## 2023-08-02 RX ORDER — SODIUM CHLORIDE 9 MG/ML
500 INJECTION INTRAMUSCULAR; INTRAVENOUS; SUBCUTANEOUS ONCE
Refills: 0 | Status: COMPLETED | OUTPATIENT
Start: 2023-08-02 | End: 2023-08-02

## 2023-08-02 RX ADMIN — Medication 40 MILLIGRAM(S): at 22:31

## 2023-08-02 RX ADMIN — SODIUM CHLORIDE 500 MILLILITER(S): 9 INJECTION INTRAMUSCULAR; INTRAVENOUS; SUBCUTANEOUS at 21:33

## 2023-08-02 NOTE — ED PROVIDER NOTE - CLINICAL SUMMARY MEDICAL DECISION MAKING FREE TEXT BOX
Cary Cespedes MD  89M w/ hx of CAD s/p CABG, s/p PPM, CHF, seizures, CKD, anemia HTN, recent UTI p/w decreased PO and weakness concerning for UTI as well as hyperkalemia and SOPHY on CKD presents to the ED from the nursing home with persistent cough, weakness and failure to thrive. Patient had a recent admission for UTI sent to rehab and developed a cough he has been treated with several antibiotics. on exam, diffuse rhonchi, no leg edema, concern for pneumonia, failure to thrive, Plan: admission, CT, labs, EKG.

## 2023-08-02 NOTE — H&P ADULT - NSHPPHYSICALEXAM_GEN_ALL_CORE
PHYSICAL EXAM    Constitutional: NAD, well-groomed, well-developed  HEENT: PERRLA, EOMI, Normal Hearing, MMM  Neck: No LAD, No JVD  Back: Normal spine flexure, No CVA tenderness  Respiratory: CTAB/L   Cardiovascular: S1 and S2, RRR, no M/G/R  Gastrointestinal: BS+, soft, NT/ND  Extremities: No peripheral edema  Vascular: 2+ peripheral pulses  Neurological: A/O x 1, no focal deficits  Skin: No rashes

## 2023-08-02 NOTE — ED ADULT NURSE NOTE - NSFALLUNIVINTERV_ED_ALL_ED
Bed/Stretcher in lowest position, wheels locked, appropriate side rails in place/Call bell, personal items and telephone in reach/Instruct patient to call for assistance before getting out of bed/chair/stretcher/Non-slip footwear applied when patient is off stretcher/[place] to call system/Physically safe environment - no spills, clutter or unnecessary equipment/Purposeful proactive rounding/Room/bathroom lighting operational, light cord in reach

## 2023-08-02 NOTE — ED PROCEDURE NOTE - CPROC ED NUMBER OF ATTEMPTS1
1 Clindamycin Counseling: I counseled the patient regarding use of clindamycin as an antibiotic for prophylactic and/or therapeutic purposes. Clindamycin is active against numerous classes of bacteria, including skin bacteria. Side effects may include nausea, diarrhea, gastrointestinal upset, rash, hives, yeast infections, and in rare cases, colitis.

## 2023-08-02 NOTE — ED ADULT NURSE NOTE - CHIEF COMPLAINT
Malnutrition Findings:   Malnutrition type: Acute illness(Severe PCM r/t significant wt loss, muscle wasting, lack of interest in food and low BMI as evidenced by 44#/27% wt loss x1 month, prominent clavicle bone, 0% meal intake, and BMI 17 78  To be addressed with as liberal diet as possible and supplements )muscle and fat loss of extremities, prominent spine and pelvis bones  Secondary to chronic illness  Degree of Malnutrition: Other severe protein calorie malnutrition    BMI Findings:  BMI Classifications: Underweight < 18 5(BMI 17 78)     Body mass index is 17 78 kg/m²     Patient reports 35 pound weight loss  Consult nutrition services  Secondary to his metastatic cancer The patient is a 89y Male complaining of

## 2023-08-02 NOTE — ED PROVIDER NOTE - ATTENDING APP SHARED VISIT CONTRIBUTION OF CARE
I performed a history and physical exam of the patient and discussed their management with the ACP. I reviewed the ACP's note and agree with the documented findings and plan of care.  Cary Cespedes MD

## 2023-08-02 NOTE — H&P ADULT - HISTORY OF PRESENT ILLNESS
89M w/ hx of CAD s/p CABG, s/p PPM, CHF, seizures, CKD, anemia, HTN, recent UTI p/w decreased PO and weakness. Pt has been very weak and almost bed bound for the past month. Pt has hx of UTIs requiring admission in the past. Around June 4th daughter became concerned over change in pt's mood and awareness. Called PMD on phone and was prescribed 1 week course of Levaquin. Pt did not seem to improve significantly and pt went to see urologist around 6/14. Reportedly urine was checked and pt was switched to Bactrim. Pt otherwise compliant with meds as they are provided by [facility].

## 2023-08-02 NOTE — ED PROVIDER NOTE - OBJECTIVE STATEMENT
Cary Cespedes MD  89M w/ hx of CAD s/p CABG, s/p PPM, CHF, seizures, CKD, anemia HTN, recent UTI p/w decreased PO and weakness concerning for UTI as well as hyperkalemia and SOPHY on CKD presents to the ED from the nursing home with persistent cough, weakness and failure to thrive. Patient had a recent admission for UTI sent to rehab and developed a cough he has been treated with several antibiotics.

## 2023-08-02 NOTE — H&P ADULT - ASSESSMENT
89M w/ hx of CAD s/p CABG, s/p PPM, CHF, seizures, CKD, anemia, HTN, recent UTI p/w decreased PO and weakness. Pt has been very weak and almost bed bound for the past month. Pt has hx of UTIs requiring admission in the past. Around June 4th daughter became concerned over change in pt's mood and awareness. Called PMD on phone and was prescribed 1 week course of Levaquin. Pt did not seem to improve significantly and pt went to see urologist around 6/14. Reportedly urine was checked and pt was switched to Bactrim. Pt otherwise compliant with meds as they are provided by [facility].   He was transferred to ER due to FTT for over last month. He was tried and given supportive IVF and Mirtazapine but not improved. He was found with UTI with ESBL and was treated with IV INVANZ for a few days. He started having cough for  afew days, COVID PCR was negative and CXR did not show any infiltration,     FTT   Continue supportive IV fluid, continue mirtazapine, Pt refuse peg tube, discuss with the daughter about palliative, awaiting decision,  Discussed with the family starting him on mirtazapine and encourage PO intake, f/u with the dietitian, and monitor weight    UTI:  E.coli ESBL, treated with IV Invanz.     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     Urinary retention  resolved off meds.    Incidental Lung Nodule   Given tobacco use hx consider repeat imaging as outpatient with PCP    CKD3   Avoid nephrotoxins, renally dose meds, Trend creatinin      DVT ppx   d/c lovenox due or hx of SH encourage ambulation.    HTN:  Taper metoprolol tartrate due to HTN and bradycardia 12.5 MG BID     Hx of SH  According is daughter monitor      Mild PVD:  Continue Aspirin and atorvastatin.      DVT ppx   Lovenox 40 mg SC daily  89M w/ hx of CAD s/p CABG, s/p PPM, CHF, seizures, CKD, anemia, HTN, recent UTI p/w decreased PO and weakness. Pt has been very weak and almost bed bound for the past month. Pt has hx of UTIs requiring admission in the past. Around June 4th daughter became concerned over change in pt's mood and awareness. Called PMD on phone and was prescribed 1 week course of Levaquin. Pt did not seem to improve significantly and pt went to see urologist around 6/14. Reportedly urine was checked and pt was switched to Bactrim. Pt otherwise compliant with meds as they are provided by [facility].   He was transferred to ER due to FTT for over last month. He was tried and given supportive IVF and Mirtazapine but not improved. He was found with UTI with ESBL and was treated with IV INVANZ for a few days. He started having cough for  afew days, COVID PCR was negative and CXR did not show any infiltration,     FTT   Continue supportive IV fluid, continue mirtazapine, Pt refuse peg tube, discuss with the daughter about palliative, awaiting decision,  Discussed with the family starting him on mirtazapine and encourage PO intake, f/u with the dietitian, and monitor weight    Pulmonary congestion   Lasix IV 20 mg, f/u with cardio     UTI:  E.coli ESBL, treated with IV Invanz.     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     Urinary retention  resolved off meds.    Incidental Lung Nodule   Given tobacco use hx consider repeat imaging as outpatient with PCP    CKD3   Avoid nephrotoxins, renally dose meds, Trend creatinin      DVT ppx   d/c lovenox due or hx of SH encourage ambulation.    HTN:  Taper metoprolol tartrate due to HTN and bradycardia 12.5 MG BID     Hx of SH  According is daughter monitor      Mild PVD:  Continue Aspirin and atorvastatin.      DVT ppx   Lovenox 40 mg SC daily

## 2023-08-02 NOTE — ED PROVIDER NOTE - PROGRESS NOTE DETAILS
[address]: Spoken to Dr. Ruvalcaba, will adminster Lasix 120 mg IV, no IV fluids, will consult Cardiology tomorrow AM. Agreed for admission in tele.

## 2023-08-02 NOTE — ED ADULT NURSE NOTE - NS PRO PASSIVE SMOKE EXP
Problem: Falls - Risk of:  Goal: Will remain free from falls  Description: Will remain free from falls  3/26/2021 1709 by Abhijit Finn RN  Outcome: Met This Shift     Problem: Falls - Risk of:  Goal: Absence of physical injury  Description: Absence of physical injury  3/26/2021 1709 by Abhijit Finn RN  Outcome: Met This Shift     Problem: OXYGENATION/RESPIRATORY FUNCTION  Goal: Patient will maintain patent airway  3/26/2021 1709 by Abhijit Finn RN  Outcome: Met This Shift     Problem: OXYGENATION/RESPIRATORY FUNCTION  Goal: Patient will achieve/maintain normal respiratory rate/effort  Description: Respiratory rate and effort will be within normal limits for the patient  Outcome: Met This Shift     Problem: Skin Integrity:  Goal: Will show no infection signs and symptoms  Description: Will show no infection signs and symptoms  3/26/2021 1709 by Abhijit Finn RN  Outcome: Met This Shift     Problem: Skin Integrity:  Goal: Absence of new skin breakdown  Description: Absence of new skin breakdown  3/26/2021 1709 by Abhijit Finn RN  Outcome: Met This Shift     Problem: Fluid Volume:  Goal: Hemodynamic stability will improve  Description: Hemodynamic stability will improve  3/26/2021 1709 by Abhijit Finn RN  Outcome: Not Met This Shift     Problem: Respiratory:  Goal: Respiratory status will improve  Description: Respiratory status will improve  3/26/2021 1709 by Abhijit Finn RN  Outcome: Not Met This Shift     Problem: HEMODYNAMIC STATUS  Goal: Patient has stable vital signs and fluid balance  3/26/2021 1709 by Abhijit Finn RN  Outcome: Not Met This Shift Unknown

## 2023-08-02 NOTE — ED PROCEDURE NOTE - ATTENDING CONTRIBUTION TO CARE
I have participated in and supervised all key portions of the above procedures and agree with the above documentation. WING Barrera MD

## 2023-08-02 NOTE — ED ADULT NURSE REASSESSMENT NOTE - NS ED NURSE REASSESS COMMENT FT1
patient aware of need for urine specimen, patient states "I want to use a urinal, I do not want a catheter, I know when to pee". Urinal given to patient. Vitals stable.

## 2023-08-02 NOTE — H&P ADULT - TIME BILLING
d/w his daughters about ACP for at least 30 min and reviewed MOLST form and decided to keep as full code.

## 2023-08-02 NOTE — CONSULT NOTE ADULT - ASSESSMENT
89M w/ hx of CAD s/p CABG, s/p PPM, CHF, seizures, CKD, anemia, HTN, recent UTI p/w decreased PO and weakness. Pt has been very weak and almost bed bound for the past month. Pt has hx of UTIs requiring admission in the past. Around June 4th daughter became concerned over change in pt's mood and awareness. Called PMD on phone and was prescribed 1 week course of Levaquin. Pt did not seem to improve significantly and pt went to see urologist around 6/14. Reportedly urine was checked and pt was switched to Bactrim. Pt otherwise compliant with meds as they are provided by [facility].    chf ?hfpef  check TTE  continue current meds  lasix 20 mg iv bid  ct chest no contrast  will adjust cardiac meds  tele  cardiac enzyme  asa daily   89M w/ hx of CAD s/p CABG, s/p PPM, CHF, seizures, CKD, anemia, HTN, recent UTI p/w decreased PO and weakness. Pt has been very weak and almost bed bound for the past month. Pt has hx of UTIs requiring admission in the past. Around June 4th daughter became concerned over change in pt's mood and awareness. Called PMD on phone and was prescribed 1 week course of Levaquin. Pt did not seem to improve significantly and pt went to see urologist around 6/14. Reportedly urine was checked and pt was switched to Bactrim. Pt otherwise compliant with meds as they are provided by [facility].    chf ?hfpef  check TTE  continue current meds  lasix 20 mg iv bid  ct chest no contrast noted with bl pleural effusion  will adjust cardiac meds  tele  cardiac enzyme  anemia, check guaiac check cbc  protonix  abx for UTI

## 2023-08-02 NOTE — CONSULT NOTE ADULT - SUBJECTIVE AND OBJECTIVE BOX
Date of Service, 08-02-23 @ 22:15  CHIEF COMPLAINT:Patient is a 89y old  Male who presents with a chief complaint of FTT (02 Aug 2023 20:38)      HPI:  89M w/ hx of CAD s/p CABG, s/p PPM, CHF, seizures, CKD, anemia, HTN, recent UTI p/w decreased PO and weakness. Pt has been very weak and almost bed bound for the past month. Pt has hx of UTIs requiring admission in the past. Around June 4th daughter became concerned over change in pt's mood and awareness. Called PMD on phone and was prescribed 1 week course of Levaquin. Pt did not seem to improve significantly and pt went to see urologist around 6/14. Reportedly urine was checked and pt was switched to Bactrim. Pt otherwise compliant with meds as they are provided by [facility].        PAST MEDICAL & SURGICAL HISTORY:  CHF, chronic      CAD (coronary artery disease)      Essential hypertension      FH: CABG (coronary artery bypass surgery)    MEDICATIONS  (STANDING):  furosemide   Injectable 80 milliGRAM(s) IV Push daily  cefpodoxime 200 mg oral tablet: 1 tab(s) orally 2 times a day  · 	metoprolol tartrate 25 mg oral tablet: 1 orally 2 times a day  · 	aspirin 81 mg oral tablet: 1 orally once a day  · 	folic acid 1 mg oral tablet: 1 orally once a day  · 	omeprazole 20 mg oral delayed release capsule: 1 orally once a day  · 	Allegra 24 Hour Allergy oral tablet: 1 orally  · 	atorvastatin 80 mg oral tablet: 1 orally once a day (at bedtime)  · 	Keppra 500 mg oral tablet: 1 orally 2 times a day  MEDICATIONS  (PRN):  guaiFENesin Oral Liquid (Sugar-Free) 100 milliGRAM(s) Oral once PRN Cough      FAMILY HISTORY:      SOCIAL HISTORY:    [x ] Non-smoker  [ ] Smoker  [ ] Alcohol    Allergies    No Known Allergies    Intolerances    	    REVIEW OF SYSTEMS:  CONSTITUTIONAL: No fever, weight loss, or fatigue  EYES: No eye pain, visual disturbances, or discharge  ENT:  No difficulty hearing, tinnitus, vertigo; No sinus or throat pain  NECK: No pain or stiffness  RESPIRATORY: No cough, wheezing, chills or hemoptysis; + Shortness of Breath  CARDIOVASCULAR: No chest pain, palpitations, passing out, dizziness, + leg swelling  GASTROINTESTINAL: No abdominal or epigastric pain. No nausea, vomiting, or hematemesis; No diarrhea or constipation. No melena or hematochezia.  GENITOURINARY: No dysuria, frequency, hematuria, or incontinence  NEUROLOGICAL: No headaches, memory loss, loss of strength, numbness, or tremors  SKIN: No itching, burning, rashes, or lesions   LYMPH Nodes: No enlarged glands  ENDOCRINE: No heat or cold intolerance; No hair loss  MUSCULOSKELETAL: No joint pain or swelling; No muscle, back, or extremity pain  PSYCHIATRIC: No depression, anxiety, mood swings, or difficulty sleeping  HEME/LYMPH: No easy bruising, or bleeding gums  ALLERGY AND IMMUNOLOGIC: No hives or eczema	    [ ] All others negative	  [x ] Unable to obtain    PHYSICAL EXAM:  T(C): 36.8 (08-02-23 @ 19:54), Max: 36.8 (08-02-23 @ 19:54)  HR: 71 (08-02-23 @ 19:54) (67 - 75)  BP: 124/67 (08-02-23 @ 19:54) (112/64 - 124/67)  RR: 18 (08-02-23 @ 19:54) (16 - 18)  SpO2: 99% (08-02-23 @ 19:54) (88% - 100%)  Wt(kg): --  I&O's Summary      Appearance: Normal	  HEENT:   Normal oral mucosa, PERRL, EOMI	  Lymphatic: No lymphadenopathy  Cardiovascular: Normal S1 S2, No JVD, +murmurs, + edema  Respiratory: decrease bs	  Psychiatry: A & O x 3, Mood & affect appropriate  Gastrointestinal:  Soft, Non-tender, + BS	  Skin: No rashes, No ecchymoses, No cyanosis	  Neurologic: Non-focal  Extremities: Normal range of motion, No clubbing, cyanosis o+edema  Vascular: Peripheral pulses palpable 2+ bilaterally    TELEMETRY: 	    ECG:  	  RADIOLOGY:  OTHER: 	  	  LABS:	 	    CARDIAC MARKERS:                              10.2   7.23  )-----------( 232      ( 02 Aug 2023 17:57 )             32.1     08-02    143  |  111<H>  |  29<H>  ----------------------------<  92  4.5   |  20<L>  |  1.43<H>    Ca    9.0      02 Aug 2023 17:57    TPro  6.7  /  Alb  2.6<L>  /  TBili  0.5  /  DBili  x   /  AST  20  /  ALT  10  /  AlkPhos  100  08-02    proBNP:   Lipid Profile:   HgA1c:   TSH:       PREVIOUS DIAGNOSTIC TESTING:      < from: 12 Lead ECG (06.30.23 @ 17:12) >  Diagnosis Line Ventricular-paced rhythm  ABNORMAL ECG  WHEN COMPARED WITH ECG OF 28-JUN-2023 17:42,  NO SIGNIFICANT CHANGE WAS FOUND    · EKG Date/Time: 02-Aug-2023 17:33  · Rate: 65  · Interpretation: abnormal  · Abnormal Rhythm: paced    < from: Xray Chest 1 View- PORTABLE-Urgent (06.28.23 @ 17:15) >  Median sternotomy. CABG. Left chest wall pacemaker.  The heart is poorly assessed in this projection.  The lungs are clear.  Azygous fissure with trace amount of fluid.  No pneumothorax.    IMPRESSION:  No focal consolidation.

## 2023-08-02 NOTE — ED ADULT NURSE NOTE - OBJECTIVE STATEMENT
89M w/ hx of CAD s/p CABG, s/p PPM, CHF, seizures, CKD, anemia, HTN, recent UTI p/w decreased PO and weakness concerning for UTI as well as hyperkalemia and SOPHY on CKD presents to the ED from the nursing home with persistent cough, weakness and failure to thrive. Patient had a recent admission for UTI sent to rehab and developed a cough he has been treated with several antibiotics. pt. arrived to ED with peripheral IV in L forearm that daughter says was placed at the rehab facility and has been being used for IV fluids and IV abx.

## 2023-08-03 PROBLEM — I50.9 HEART FAILURE, UNSPECIFIED: Chronic | Status: ACTIVE | Noted: 2023-06-28

## 2023-08-03 PROBLEM — I25.10 ATHEROSCLEROTIC HEART DISEASE OF NATIVE CORONARY ARTERY WITHOUT ANGINA PECTORIS: Chronic | Status: ACTIVE | Noted: 2023-06-29

## 2023-08-03 PROBLEM — I10 ESSENTIAL (PRIMARY) HYPERTENSION: Chronic | Status: ACTIVE | Noted: 2023-06-29

## 2023-08-03 LAB
ANION GAP SERPL CALC-SCNC: 16 MMOL/L — SIGNIFICANT CHANGE UP (ref 5–17)
BUN SERPL-MCNC: 32 MG/DL — HIGH (ref 7–23)
CALCIUM SERPL-MCNC: 9.1 MG/DL — SIGNIFICANT CHANGE UP (ref 8.4–10.5)
CHLORIDE SERPL-SCNC: 109 MMOL/L — HIGH (ref 96–108)
CO2 SERPL-SCNC: 15 MMOL/L — LOW (ref 22–31)
CREAT SERPL-MCNC: 1.6 MG/DL — HIGH (ref 0.5–1.3)
EGFR: 40 ML/MIN/1.73M2 — LOW
GLUCOSE SERPL-MCNC: 94 MG/DL — SIGNIFICANT CHANGE UP (ref 70–99)
MAGNESIUM SERPL-MCNC: 2.1 MG/DL — SIGNIFICANT CHANGE UP (ref 1.6–2.6)
PHOSPHATE SERPL-MCNC: 3.8 MG/DL — SIGNIFICANT CHANGE UP (ref 2.5–4.5)
POTASSIUM SERPL-MCNC: 5.2 MMOL/L — SIGNIFICANT CHANGE UP (ref 3.5–5.3)
POTASSIUM SERPL-SCNC: 5.2 MMOL/L — SIGNIFICANT CHANGE UP (ref 3.5–5.3)
SODIUM SERPL-SCNC: 140 MMOL/L — SIGNIFICANT CHANGE UP (ref 135–145)
TROPONIN T, HIGH SENSITIVITY RESULT: 88 NG/L — HIGH (ref 0–51)

## 2023-08-03 PROCEDURE — 93306 TTE W/DOPPLER COMPLETE: CPT | Mod: 26

## 2023-08-03 RX ORDER — FOLIC ACID 0.8 MG
1 TABLET ORAL DAILY
Refills: 0 | Status: DISCONTINUED | OUTPATIENT
Start: 2023-08-03 | End: 2023-08-11

## 2023-08-03 RX ORDER — ASPIRIN/CALCIUM CARB/MAGNESIUM 324 MG
81 TABLET ORAL DAILY
Refills: 0 | Status: DISCONTINUED | OUTPATIENT
Start: 2023-08-03 | End: 2023-08-11

## 2023-08-03 RX ORDER — LEVETIRACETAM 250 MG/1
1 TABLET, FILM COATED ORAL
Refills: 0 | DISCHARGE

## 2023-08-03 RX ORDER — FEXOFENADINE HCL 30 MG
1 TABLET ORAL
Refills: 0 | DISCHARGE

## 2023-08-03 RX ORDER — ATORVASTATIN CALCIUM 80 MG/1
80 TABLET, FILM COATED ORAL AT BEDTIME
Refills: 0 | Status: DISCONTINUED | OUTPATIENT
Start: 2023-08-03 | End: 2023-08-11

## 2023-08-03 RX ORDER — LEVETIRACETAM 250 MG/1
500 TABLET, FILM COATED ORAL
Refills: 0 | Status: DISCONTINUED | OUTPATIENT
Start: 2023-08-03 | End: 2023-08-11

## 2023-08-03 RX ORDER — ENOXAPARIN SODIUM 100 MG/ML
40 INJECTION SUBCUTANEOUS EVERY 24 HOURS
Refills: 0 | Status: DISCONTINUED | OUTPATIENT
Start: 2023-08-03 | End: 2023-08-04

## 2023-08-03 RX ORDER — ASPIRIN/CALCIUM CARB/MAGNESIUM 324 MG
1 TABLET ORAL
Refills: 0 | DISCHARGE

## 2023-08-03 RX ORDER — METOPROLOL TARTRATE 50 MG
1 TABLET ORAL
Refills: 0 | DISCHARGE

## 2023-08-03 RX ORDER — CEFTRIAXONE 500 MG/1
1000 INJECTION, POWDER, FOR SOLUTION INTRAMUSCULAR; INTRAVENOUS EVERY 24 HOURS
Refills: 0 | Status: DISCONTINUED | OUTPATIENT
Start: 2023-08-03 | End: 2023-08-03

## 2023-08-03 RX ORDER — ATORVASTATIN CALCIUM 80 MG/1
1 TABLET, FILM COATED ORAL
Refills: 0 | DISCHARGE

## 2023-08-03 RX ORDER — MIRTAZAPINE 45 MG/1
15 TABLET, ORALLY DISINTEGRATING ORAL DAILY
Refills: 0 | Status: DISCONTINUED | OUTPATIENT
Start: 2023-08-03 | End: 2023-08-11

## 2023-08-03 RX ORDER — FOLIC ACID 0.8 MG
1 TABLET ORAL
Refills: 0 | DISCHARGE

## 2023-08-03 RX ORDER — PANTOPRAZOLE SODIUM 20 MG/1
40 TABLET, DELAYED RELEASE ORAL
Refills: 0 | Status: DISCONTINUED | OUTPATIENT
Start: 2023-08-03 | End: 2023-08-11

## 2023-08-03 RX ORDER — MIRTAZAPINE 45 MG/1
1 TABLET, ORALLY DISINTEGRATING ORAL
Refills: 0 | DISCHARGE

## 2023-08-03 RX ORDER — FUROSEMIDE 40 MG
20 TABLET ORAL
Refills: 0 | Status: DISCONTINUED | OUTPATIENT
Start: 2023-08-03 | End: 2023-08-04

## 2023-08-03 RX ORDER — OMEPRAZOLE 10 MG/1
1 CAPSULE, DELAYED RELEASE ORAL
Refills: 0 | DISCHARGE

## 2023-08-03 RX ORDER — METOPROLOL TARTRATE 50 MG
25 TABLET ORAL
Refills: 0 | Status: DISCONTINUED | OUTPATIENT
Start: 2023-08-03 | End: 2023-08-11

## 2023-08-03 RX ORDER — ACETAMINOPHEN 500 MG
2 TABLET ORAL
Refills: 0 | DISCHARGE

## 2023-08-03 RX ADMIN — Medication 100 MILLIGRAM(S): at 17:21

## 2023-08-03 RX ADMIN — Medication 81 MILLIGRAM(S): at 13:19

## 2023-08-03 RX ADMIN — Medication 25 MILLIGRAM(S): at 17:21

## 2023-08-03 RX ADMIN — PANTOPRAZOLE SODIUM 40 MILLIGRAM(S): 20 TABLET, DELAYED RELEASE ORAL at 13:19

## 2023-08-03 RX ADMIN — ENOXAPARIN SODIUM 40 MILLIGRAM(S): 100 INJECTION SUBCUTANEOUS at 13:20

## 2023-08-03 RX ADMIN — MIRTAZAPINE 15 MILLIGRAM(S): 45 TABLET, ORALLY DISINTEGRATING ORAL at 17:20

## 2023-08-03 RX ADMIN — ATORVASTATIN CALCIUM 80 MILLIGRAM(S): 80 TABLET, FILM COATED ORAL at 21:40

## 2023-08-03 RX ADMIN — Medication 20 MILLIGRAM(S): at 13:20

## 2023-08-03 RX ADMIN — Medication 1 MILLIGRAM(S): at 13:19

## 2023-08-03 RX ADMIN — Medication 20 MILLIGRAM(S): at 06:01

## 2023-08-03 RX ADMIN — LEVETIRACETAM 500 MILLIGRAM(S): 250 TABLET, FILM COATED ORAL at 17:21

## 2023-08-03 NOTE — SWALLOW BEDSIDE ASSESSMENT ADULT - PHARYNGEAL PHASE
Mildly reduced elevation-WFL for a Pt of this advanced age; No overt s/s of laryngeal penetration/aspiration

## 2023-08-03 NOTE — SWALLOW BEDSIDE ASSESSMENT ADULT - SLP GENERAL OBSERVATIONS
Encountered Pt asleep, laying semi-supine on stretcher on RA. Pt aroused to verbal/tactile stimuli. B/l HAs in place for this evaluation. Pt is A&Ox1, verbally responsive, and able to follow simple commands. +baseline cough.

## 2023-08-03 NOTE — PATIENT PROFILE ADULT - TYPE OF COMMUNICATION USED TO ADDRESS HEALTHCARE
· Na 133 this morning (from 132,131, 133)  · No h/o hyponatremia per chart review
Other
Contraindicated

## 2023-08-03 NOTE — SWALLOW BEDSIDE ASSESSMENT ADULT - ORAL PREPARATORY PHASE
Mildly loose-fitting dentures/Within functional limits Mildly prolonged yet functional mastication likely 2/2 mildly loose-fitting dentures Within functional limits

## 2023-08-03 NOTE — PROGRESS NOTE ADULT - ASSESSMENT
89M w/ hx of CAD s/p CABG, s/p PPM, CHF, seizures, CKD, anemia, HTN, recent UTI p/w decreased PO and weakness. Pt has been very weak and almost bed bound for the past month. Pt has hx of UTIs requiring admission in the past. Around June 4th daughter became concerned over change in pt's mood and awareness. Called PMD on phone and was prescribed 1 week course of Levaquin. Pt did not seem to improve significantly and pt went to see urologist around 6/14. Reportedly urine was checked and pt was switched to Bactrim. Pt otherwise compliant with meds as they are provided by [facility].   He was transferred to ER due to FTT for over last month. He was tried and given supportive IVF and Mirtazapine but not improved. He was found with UTI with ESBL and was treated with IV INVANZ for a few days. He started having cough for  afew days, COVID PCR was negative and CXR did not show any infiltration,     FTT   Continue supportive IV fluid, continue mirtazapine,  discuss with the daughter about palliative, awaiting decision,  Discussed with the family starting him on mirtazapine and encourage PO intake, f/u with the dietitian, and monitor weight. Check TSH prealbumin CBC and CMP.   Patient does not wish for any peg tube or TF.     Pulmonary edema  b/l pleural effusion. Continue IV. Lasix f/u with cardio  Check echocardiogram     UTI:  E.coli ESBL, treated with IV Invanz.     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     Urinary retention  resolved off meds.    Incidental Lung Nodule   Given tobacco use hx consider repeat imaging as outpatient with PCP    CKD3   Avoid nephrotoxins, renally dose meds, Trend creatinin      DVT ppx   d/c lovenox due or hx of SH encourage ambulation.    HTN:  Taper metoprolol tartrate due to HTN and bradycardia 12.5 MG BID     Hx of SH  According is daughter monitor      Mild PVD:  Continue Aspirin and atorvastatin.      DVT ppx   Lovenox 40 mg SC daily

## 2023-08-03 NOTE — CONSULT NOTE ADULT - SUBJECTIVE AND OBJECTIVE BOX
Patient is a 89y Male whom presented to the hospital with ckd stage3     PAST MEDICAL & SURGICAL HISTORY:  CHF, chronic      CAD (coronary artery disease)      Essential hypertension      FH: CABG (coronary artery bypass surgery)          MEDICATIONS  (STANDING):  aspirin enteric coated 81 milliGRAM(s) Oral daily  atorvastatin 80 milliGRAM(s) Oral at bedtime  enoxaparin Injectable 40 milliGRAM(s) SubCutaneous every 24 hours  folic acid 1 milliGRAM(s) Oral daily  furosemide   Injectable 20 milliGRAM(s) IV Push two times a day  levETIRAcetam 500 milliGRAM(s) Oral two times a day  metoprolol tartrate 25 milliGRAM(s) Oral two times a day  mirtazapine 15 milliGRAM(s) Oral daily  pantoprazole    Tablet 40 milliGRAM(s) Oral before breakfast      Allergies    No Known Allergies    Intolerances        SOCIAL HISTORY:  Denies ETOh,Smoking,     FAMILY HISTORY:      REVIEW OF SYSTEMS:    CONSTITUTIONAL: No weakness, fevers or chills  RESPIRATORY: No cough, wheezing, hemoptysis; No shortness of breath  CARDIOVASCULAR: No chest pain or palpitations  GASTROINTESTINAL: No abdominal or epigastric pain. No nausea, vomiting,     No diarrhea or constipation. No melena   GENITOURINARY: No dysuria, frequency or hematuria  NEUROLOGICAL: pos  weakness  SKIN: dry      VITAL:  T(C): , Max: 36.8 (08-02-23 @ 19:54)  T(F): , Max: 98.3 (08-02-23 @ 19:54)  HR: 60 (08-03-23 @ 17:02)  BP: 133/70 (08-03-23 @ 17:02)  BP(mean): 84 (08-02-23 @ 22:26)  RR: 19 (08-03-23 @ 17:02)  SpO2: 95% (08-03-23 @ 17:02)  Wt(kg): --    I and O's:        PHYSICAL EXAM:    Constitutional: NAD  HEENT: conjunctive   clear   Neck:  No JVD  Respiratory: decrease bs b/l   Cardiovascular: S1 and S2  Gastrointestinal: BS+, soft, NT/ND  Extremities: No peripheral edema  Neurological: A/O x 1, no focal deficits  Psychiatric: Normal mood, normal affect  : No Martinez  Skin: dry   Access: Not applicable    LABS:                        10.2   7.23  )-----------( 232      ( 02 Aug 2023 17:57 )             32.1     08-03    140  |  109<H>  |  32<H>  ----------------------------<  94  5.2   |  15<L>  |  1.60<H>    Ca    9.1      03 Aug 2023 16:26  Phos  3.8     08-03  Mg     2.1     08-03    TPro  6.7  /  Alb  2.6<L>  /  TBili  0.5  /  DBili  x   /  AST  20  /  ALT  10  /  AlkPhos  100  08-02      Urine Studies:  Urinalysis Basic - ( 03 Aug 2023 16:26 )    Color: x / Appearance: x / SG: x / pH: x  Gluc: 94 mg/dL / Ketone: x  / Bili: x / Urobili: x   Blood: x / Protein: x / Nitrite: x   Leuk Esterase: x / RBC: x / WBC x   Sq Epi: x / Non Sq Epi: x / Bacteria: x            RADIOLOGY & ADDITIONAL STUDIES:          MEDICATIONS  (STANDING):  aspirin enteric coated 81 milliGRAM(s) Oral daily  atorvastatin 80 milliGRAM(s) Oral at bedtime  enoxaparin Injectable 40 milliGRAM(s) SubCutaneous every 24 hours  folic acid 1 milliGRAM(s) Oral daily  furosemide   Injectable 20 milliGRAM(s) IV Push two times a day  levETIRAcetam 500 milliGRAM(s) Oral two times a day  metoprolol tartrate 25 milliGRAM(s) Oral two times a day  mirtazapine 15 milliGRAM(s) Oral daily  pantoprazole    Tablet 40 milliGRAM(s) Oral before breakfast

## 2023-08-03 NOTE — CONSULT NOTE ADULT - ASSESSMENT
89M w/ hx of CAD s/p CABG, s/p PPM, CHF, seizures, CKD, anemia, HTN, recent UTI p/w decreased PO and weakness. Pt has been very weak and almost bed bound for the past month. Pt has hx of UTIs requiring admission in the past. Around June 4th daughter became concerned over change in pt's mood and awareness. Called PMD on phone and was prescribed 1 week course of Levaquin. Pt did not seem to improve significantly and pt went to see urologist around 6/14. Reportedly urine was checked and pt was switched to Bactrim. Pt otherwise compliant with meds as they are provided by [facility].  (02 Aug 2023 20:38)      CHRONIC KIDNEY DISEASE, STAGE 3: furosemide   Injectable 20 milliGRAM(s) IV Push two times a day    Serum creatinine is stable at 1.6, approximating a GFR is decreased   ml/min.   There is no progression.  No uremic symptoms. No evidence of  worsening  Anemia. Fluid status stable.   Will continue to avoid nephrotoxic drugs.  Patient remains asymptomatic.  Continue current therapy.      ANEMIA PLAN:  Anemia of chronic disease:  We will consider epo  aiming for a HCT of 32-36 %.   We will monitor Iron stores, B12 and RBC folate .    fluid overload furosemide   Injectable 20 milliGRAM(s) IV Push two times a day    BP monitoring,continue current antihypertensive meds, low salt diet,followup with PMD in 1-2 weeks  metoprolol tartrate 25 milliGRAM(s) Oral two times a day

## 2023-08-03 NOTE — PATIENT PROFILE ADULT - FALL HARM RISK - HARM RISK INTERVENTIONS
Assistance with ambulation/Assistance OOB with selected safe patient handling equipment/Communicate Risk of Fall with Harm to all staff/Discuss with provider need for PT consult/Monitor for mental status changes/Monitor gait and stability/Provide patient with walking aids - walker, cane, crutches/Reinforce activity limits and safety measures with patient and family/Reorient to person, place and time as needed/Review medications for side effects contributing to fall risk/Sit up slowly, dangle for a short time, stand at bedside before walking/Use of alarms - bed, chair and/or voice tab/Visual Cue: Yellow wristband and red socks/Bed in lowest position, wheels locked, appropriate side rails in place/Call bell, personal items and telephone in reach/Instruct patient to call for assistance before getting out of bed or chair/Non-slip footwear when patient is out of bed/[place] to call system/Physically safe environment - no spills, clutter or unnecessary equipment/Purposeful Proactive Rounding/Room/bathroom lighting operational, light cord in reach

## 2023-08-03 NOTE — PROGRESS NOTE ADULT - SUBJECTIVE AND OBJECTIVE BOX
Patient is a 89y old  Male who presents with a chief complaint of FTT (03 Aug 2023 09:37)      INTERVAL HPI/OVERNIGHT EVENTS: Patient medically stable. CT showed some pulmonary edema with b/l pleural effusion.  Started on Lasix . Continue 20 mg BID recommended by cardio. F/u echo results. Encourage PO intake due to FTT. Monitor labs. Check prealbumin TSH and other basic labs.     Pain Location & Control:     MEDICATIONS  (STANDING):  aspirin enteric coated 81 milliGRAM(s) Oral daily  atorvastatin 80 milliGRAM(s) Oral at bedtime  enoxaparin Injectable 40 milliGRAM(s) SubCutaneous every 24 hours  folic acid 1 milliGRAM(s) Oral daily  furosemide   Injectable 20 milliGRAM(s) IV Push two times a day  levETIRAcetam 500 milliGRAM(s) Oral two times a day  metoprolol tartrate 25 milliGRAM(s) Oral two times a day  mirtazapine 15 milliGRAM(s) Oral daily  pantoprazole    Tablet 40 milliGRAM(s) Oral before breakfast    MEDICATIONS  (PRN):  guaiFENesin Oral Liquid (Sugar-Free) 100 milliGRAM(s) Oral once PRN Cough      Allergies    No Known Allergies    Intolerances        REVIEW OF SYSTEMS:  CONSTITUTIONAL: No fever, weight loss, or fatigue  EYES: No eye pain, visual disturbances, or discharge  ENMT:  No difficulty hearing, tinnitus, vertigo; No sinus or throat pain  NECK: No pain or stiffness  BREASTS: No pain, masses, or nipple discharge  RESPIRATORY: No cough, wheezing, chills or hemoptysis; No shortness of breath  CARDIOVASCULAR: No chest pain, palpitations, dizziness, or leg swelling  GASTROINTESTINAL: No abdominal or epigastric pain. No nausea, vomiting, or hematemesis; No diarrhea or constipation. No melena or hematochezia.  GENITOURINARY: No dysuria, frequency, hematuria, or incontinence  NEUROLOGICAL: No headaches, memory loss, loss of strength, numbness, or tremors  SKIN: No itching, burning, rashes, or lesions   LYMPH NODES: No enlarged glands  ENDOCRINE: No heat or cold intolerance; No hair loss; No polydipsia or polyuria  MUSCULOSKELETAL: No back pain  PSYCHIATRIC: No depression, anxiety, mood swings, or difficulty sleeping  HEME/LYMPH: No easy bruising, or bleeding gums  ALLERGY AND IMMUNOLOGIC: No hives or eczema    Vital Signs Last 24 Hrs  T(C): 36.7 (03 Aug 2023 17:02), Max: 36.8 (02 Aug 2023 19:54)  T(F): 98.1 (03 Aug 2023 17:02), Max: 98.3 (02 Aug 2023 19:54)  HR: 60 (03 Aug 2023 17:02) (60 - 75)  BP: 133/70 (03 Aug 2023 17:02) (102/52 - 136/74)  BP(mean): 84 (02 Aug 2023 22:26) (84 - 84)  RR: 19 (03 Aug 2023 17:02) (16 - 19)  SpO2: 95% (03 Aug 2023 17:02) (94% - 100%)    Parameters below as of 03 Aug 2023 17:02  Patient On (Oxygen Delivery Method): room air        PHYSICAL EXAM:  GENERAL: NAD, well-groomed, well-developed  HEAD:  Atraumatic, Normocephalic  EYES: EOMI, PERRLA, conjunctiva and sclera clear  ENMT: No tonsillar erythema, exudates, or enlargement; Moist mucous membranes, Good dentition, No lesions  NECK: Supple, No JVD, Normal thyroid  NERVOUS SYSTEM:  Alert & Oriented X 1   CHEST/LUNG: Clear to auscultation bilaterally; No rales, rhonchi, wheezing, or rubs  HEART: Regular rate and rhythm; No murmurs, rubs, or gallops  ABDOMEN: Soft, Nontender, Nondistended; Bowel sounds present  EXTREMITIES:  2+ Peripheral Pulses, No clubbing or cyanosis  LYMPH: No lymphadenopathy noted  SKIN: No rashes or lesions      LABS:                        10.2   7.23  )-----------( 232      ( 02 Aug 2023 17:57 )             32.1     03 Aug 2023 16:26    140    |  109    |  32     ----------------------------<  94     5.2     |  15     |  1.60     Ca    9.1        03 Aug 2023 16:26  Phos  3.8       03 Aug 2023 16:26  Mg     2.1       03 Aug 2023 16:26    TPro  6.7    /  Alb  2.6    /  TBili  0.5    /  DBili  x      /  AST  20     /  ALT  10     /  AlkPhos  100    02 Aug 2023 17:57      Urinalysis Basic - ( 03 Aug 2023 16:26 )    Color: x / Appearance: x / SG: x / pH: x  Gluc: 94 mg/dL / Ketone: x  / Bili: x / Urobili: x   Blood: x / Protein: x / Nitrite: x   Leuk Esterase: x / RBC: x / WBC x   Sq Epi: x / Non Sq Epi: x / Bacteria: x      CAPILLARY BLOOD GLUCOSE            Cultures      RADIOLOGY & ADDITIONAL TESTS:    Imaging Personally Reviewed:  [X ] YES  [ ] NO    Consultant(s) Notes Reviewed:  [ X] YES  [ ] NO    Care Discussed with Consultants/Other Providers [X ] YES  [ ] NO

## 2023-08-03 NOTE — PROGRESS NOTE ADULT - SUBJECTIVE AND OBJECTIVE BOX
Date of Service: 08-03-23 @ 09:37           CARDIOLOGY     PROGRESS  NOTE   ________________________________________________    CHIEF COMPLAINT:Patient is a 89y old  Male who presents with a chief complaint of FTT (02 Aug 2023 22:15)  doing better  	  REVIEW OF SYSTEMS:  CONSTITUTIONAL: No fever, weight loss, or fatigue  EYES: No eye pain, visual disturbances, or discharge  ENT:  No difficulty hearing, tinnitus, vertigo; No sinus or throat pain  NECK: No pain or stiffness  RESPIRATORY: No cough, wheezing, chills or hemoptysis; + Shortness of Breath  CARDIOVASCULAR: No chest pain, palpitations, passing out, dizziness, or leg swelling  GASTROINTESTINAL: No abdominal or epigastric pain. No nausea, vomiting, or hematemesis; No diarrhea or constipation. No melena or hematochezia.  GENITOURINARY: No dysuria, frequency, hematuria, or incontinence  NEUROLOGICAL: No headaches, memory loss, loss of strength, numbness, or tremors  SKIN: No itching, burning, rashes, or lesions   LYMPH Nodes: No enlarged glands  ENDOCRINE: No heat or cold intolerance; No hair loss  MUSCULOSKELETAL: No joint pain or swelling; No muscle, back, or extremity pain  PSYCHIATRIC: No depression, anxiety, mood swings, or difficulty sleeping  HEME/LYMPH: No easy bruising, or bleeding gums  ALLERGY AND IMMUNOLOGIC: No hives or eczema	    [ ] All others negative	  [x ] Unable to obtain    PHYSICAL EXAM:  T(C): 36.6 (08-03-23 @ 04:52), Max: 36.8 (08-02-23 @ 19:54)  HR: 65 (08-03-23 @ 04:52) (60 - 75)  BP: 116/67 (08-03-23 @ 04:52) (102/52 - 136/74)  RR: 18 (08-03-23 @ 04:52) (16 - 18)  SpO2: 97% (08-03-23 @ 04:52) (88% - 100%)  Wt(kg): --  I&O's Summary      Appearance: Normal	  HEENT:   Normal oral mucosa, PERRL, EOMI	  Lymphatic: No lymphadenopathy  Cardiovascular: Normal S1 S2, No JVD, + murmurs, No edema  Respiratory: rhonchi	  Psychiatry: decrease bs  Gastrointestinal:  Soft, Non-tender, + BS	  Skin: No rashes, No ecchymoses, No cyanosis	  Extremities: Normal range of motion, No clubbing, cyanosis or edema  Vascular: Peripheral pulses palpable 2+ bilaterally    MEDICATIONS  (STANDING):  aspirin enteric coated 81 milliGRAM(s) Oral daily  atorvastatin 80 milliGRAM(s) Oral at bedtime  enoxaparin Injectable 40 milliGRAM(s) SubCutaneous every 24 hours  folic acid 1 milliGRAM(s) Oral daily  furosemide   Injectable 20 milliGRAM(s) IV Push two times a day  levETIRAcetam 500 milliGRAM(s) Oral two times a day  metoprolol tartrate 25 milliGRAM(s) Oral two times a day  pantoprazole    Tablet 40 milliGRAM(s) Oral before breakfast      TELEMETRY: 	    ECG:  	  RADIOLOGY:  OTHER: 	  	  LABS:	 	    CARDIAC MARKERS:                                10.2   7.23  )-----------( 232      ( 02 Aug 2023 17:57 )             32.1     08-02    143  |  111<H>  |  29<H>  ----------------------------<  92  4.5   |  20<L>  |  1.43<H>    Ca    9.0      02 Aug 2023 17:57    TPro  6.7  /  Alb  2.6<L>  /  TBili  0.5  /  DBili  x   /  AST  20  /  ALT  10  /  AlkPhos  100  08-02    proBNP:   Lipid Profile:   HgA1c:   TSH:         Assessment and plan  ---------------------------  89M w/ hx of CAD s/p CABG, s/p PPM, CHF, seizures, CKD, anemia, HTN, recent UTI p/w decreased PO and weakness. Pt has been very weak and almost bed bound for the past month. Pt has hx of UTIs requiring admission in the past. Around June 4th daughter became concerned over change in pt's mood and awareness. Called PMD on phone and was prescribed 1 week course of Levaquin. Pt did not seem to improve significantly and pt went to see urologist around 6/14. Reportedly urine was checked and pt was switched to Bactrim. Pt otherwise compliant with meds as they are provided by [facility].    chf ?hfpef  check TTE  continue current meds  Lasix 20 mg iv bid  ct chest no contrast noted with bl pleural effusion  will adjust cardiac meds  tele  cardiac enzyme  anemia, check guaiac check cbc  Protonix  abx for UTI  awaiting blood work  continue gentle diuresis, awaiting echo called

## 2023-08-03 NOTE — SWALLOW BEDSIDE ASSESSMENT ADULT - SWALLOW EVAL: DIAGNOSIS
89y M w/ PMHx of CHF, seizures, and recurrent UTIs presents to ED from rehab for decreased PO and weakness. Pt admitted for CHF exacerbation; lasix administered. Pt presents w/ an overtly functional swallow. No overt s/s of laryngeal penetration/aspiration noted across PO trials. Upon palpation, onset of pharyngeal swallow is WNL and hyolaryngeal excursion is mildly reduced, however, WFL for a Pt of this advanced age. Mildly prolonged yet functional mastication noted w/ solids likely 2/2 slightly loose-fitting dentures. This service will follow up to monitor diet tolerance.

## 2023-08-03 NOTE — SWALLOW BEDSIDE ASSESSMENT ADULT - SLP PERTINENT HISTORY OF CURRENT PROBLEM
89y M w/ PMHx of CAD s/p CABG, s/p PPM, CHF, seizures, CKD, anemia, HTN, and recent UTI presents to ED from rehab for decreased PO and weakness. Per H&P, "Continue supportive IV fluid, continue mirtazapine, Pt refuse peg tube, discuss with the daughter about palliative, awaiting decision". 89y M w/ PMHx of CAD s/p CABG, s/p PPM, CHF, seizures, CKD, anemia, HTN, and recent UTI presents to ED from rehab for decreased PO and weakness. Pt admitted for CHF exacerbation; lasix administered. Per H&P, "Continue supportive IV fluid, continue mirtazapine, Pt refuse peg tube, discuss with the daughter about palliative, awaiting decision".

## 2023-08-03 NOTE — SWALLOW BEDSIDE ASSESSMENT ADULT - COMMENTS
IMAGIN/2 CT chest: IMPRESSION: Bilateral pleural effusions with associated atelectasis as described above. No definite evidence for pneumonia/infection.    ***Pt is not previously known to this service and no prior studies noted in PACS.

## 2023-08-04 DIAGNOSIS — Z51.5 ENCOUNTER FOR PALLIATIVE CARE: ICD-10-CM

## 2023-08-04 DIAGNOSIS — R62.7 ADULT FAILURE TO THRIVE: ICD-10-CM

## 2023-08-04 DIAGNOSIS — Z71.89 OTHER SPECIFIED COUNSELING: ICD-10-CM

## 2023-08-04 DIAGNOSIS — R53.81 OTHER MALAISE: ICD-10-CM

## 2023-08-04 DIAGNOSIS — I50.9 HEART FAILURE, UNSPECIFIED: ICD-10-CM

## 2023-08-04 DIAGNOSIS — N39.0 URINARY TRACT INFECTION, SITE NOT SPECIFIED: ICD-10-CM

## 2023-08-04 LAB
A1C WITH ESTIMATED AVERAGE GLUCOSE RESULT: SIGNIFICANT CHANGE UP (ref 4–5.6)
ANION GAP SERPL CALC-SCNC: 16 MMOL/L — SIGNIFICANT CHANGE UP (ref 5–17)
BUN SERPL-MCNC: 35 MG/DL — HIGH (ref 7–23)
CALCIUM SERPL-MCNC: 9.1 MG/DL — SIGNIFICANT CHANGE UP (ref 8.4–10.5)
CHLORIDE SERPL-SCNC: 110 MMOL/L — HIGH (ref 96–108)
CHOLEST SERPL-MCNC: 88 MG/DL — SIGNIFICANT CHANGE UP
CO2 SERPL-SCNC: 19 MMOL/L — LOW (ref 22–31)
CREAT SERPL-MCNC: 1.82 MG/DL — HIGH (ref 0.5–1.3)
EGFR: 34 ML/MIN/1.73M2 — LOW
GLUCOSE SERPL-MCNC: 75 MG/DL — SIGNIFICANT CHANGE UP (ref 70–99)
HDLC SERPL-MCNC: 31 MG/DL — LOW
LIPID PNL WITH DIRECT LDL SERPL: 40 MG/DL — SIGNIFICANT CHANGE UP
MAGNESIUM SERPL-MCNC: 2 MG/DL — SIGNIFICANT CHANGE UP (ref 1.6–2.6)
NON HDL CHOLESTEROL: 57 MG/DL — SIGNIFICANT CHANGE UP
PHOSPHATE SERPL-MCNC: 4 MG/DL — SIGNIFICANT CHANGE UP (ref 2.5–4.5)
POTASSIUM SERPL-MCNC: 3.9 MMOL/L — SIGNIFICANT CHANGE UP (ref 3.5–5.3)
POTASSIUM SERPL-SCNC: 3.9 MMOL/L — SIGNIFICANT CHANGE UP (ref 3.5–5.3)
PREALB SERPL-MCNC: 10 MG/DL — LOW (ref 20–40)
PROCALCITONIN SERPL-MCNC: 0.11 NG/ML — HIGH (ref 0.02–0.1)
SODIUM SERPL-SCNC: 145 MMOL/L — SIGNIFICANT CHANGE UP (ref 135–145)
TRIGL SERPL-MCNC: 81 MG/DL — SIGNIFICANT CHANGE UP
TSH SERPL-MCNC: 2.3 UIU/ML — SIGNIFICANT CHANGE UP (ref 0.27–4.2)

## 2023-08-04 PROCEDURE — 99498 ADVNCD CARE PLAN ADDL 30 MIN: CPT | Mod: 25

## 2023-08-04 PROCEDURE — 99497 ADVNCD CARE PLAN 30 MIN: CPT | Mod: 25

## 2023-08-04 PROCEDURE — 99222 1ST HOSP IP/OBS MODERATE 55: CPT

## 2023-08-04 RX ORDER — ENOXAPARIN SODIUM 100 MG/ML
30 INJECTION SUBCUTANEOUS EVERY 24 HOURS
Refills: 0 | Status: DISCONTINUED | OUTPATIENT
Start: 2023-08-04 | End: 2023-08-11

## 2023-08-04 RX ORDER — FUROSEMIDE 40 MG
20 TABLET ORAL DAILY
Refills: 0 | Status: DISCONTINUED | OUTPATIENT
Start: 2023-08-04 | End: 2023-08-10

## 2023-08-04 RX ORDER — ACETAMINOPHEN 500 MG
2 TABLET ORAL
Refills: 0 | DISCHARGE

## 2023-08-04 RX ORDER — CHLORHEXIDINE GLUCONATE 213 G/1000ML
1 SOLUTION TOPICAL
Refills: 0 | Status: DISCONTINUED | OUTPATIENT
Start: 2023-08-04 | End: 2023-08-11

## 2023-08-04 RX ADMIN — MIRTAZAPINE 15 MILLIGRAM(S): 45 TABLET, ORALLY DISINTEGRATING ORAL at 12:01

## 2023-08-04 RX ADMIN — LEVETIRACETAM 500 MILLIGRAM(S): 250 TABLET, FILM COATED ORAL at 18:34

## 2023-08-04 RX ADMIN — ENOXAPARIN SODIUM 30 MILLIGRAM(S): 100 INJECTION SUBCUTANEOUS at 22:08

## 2023-08-04 RX ADMIN — PANTOPRAZOLE SODIUM 40 MILLIGRAM(S): 20 TABLET, DELAYED RELEASE ORAL at 06:25

## 2023-08-04 RX ADMIN — Medication 25 MILLIGRAM(S): at 06:25

## 2023-08-04 RX ADMIN — Medication 1 MILLIGRAM(S): at 12:02

## 2023-08-04 RX ADMIN — LEVETIRACETAM 500 MILLIGRAM(S): 250 TABLET, FILM COATED ORAL at 06:25

## 2023-08-04 RX ADMIN — Medication 81 MILLIGRAM(S): at 12:02

## 2023-08-04 RX ADMIN — Medication 25 MILLIGRAM(S): at 18:34

## 2023-08-04 RX ADMIN — Medication 20 MILLIGRAM(S): at 06:25

## 2023-08-04 NOTE — CONSULT NOTE ADULT - SUBJECTIVE AND OBJECTIVE BOX
Date of Service: 08-04-23 @ 15:12    HPI:  89M w/ hx of CAD s/p CABG, s/p PPM, CHF, seizures, CKD, anemia, HTN, recent UTI p/w decreased PO and weakness. Pt has been very weak and almost bed bound for the past month. Pt has hx of UTIs requiring admission in the past. Around June 4th daughter became concerned over change in pt's mood and awareness. Called PMD on phone and was prescribed 1 week course of Levaquin. Pt did not seem to improve significantly and pt went to see urologist around 6/14. Reportedly urine was checked and pt was switched to Bactrim. Pt otherwise compliant with meds as they are provided by [facility].  (02 Aug 2023 20:38). Palliative care consulted for GOC     PERTINENT PM/SXH:   CHF, chronic    CAD (coronary artery disease)    Essential hypertension      FH: CABG (coronary artery bypass surgery)      FAMILY HISTORY:      ITEMS NOT CHECKED ARE NOT PRESENT    SOCIAL HISTORY:   Significant other/partner[ ]  Children[x ]  Sikhism/Spirituality:  Substance hx:  [ ]   Tobacco hx:  [ ]   Alcohol hx: [ ]   Home Opioid hx:  [ ] I-Stop Reference No:  Living Situation: [Home  [ ]Long term care  [x ]Rehab [ ]Other    ADVANCE DIRECTIVES:    DNR/MOLST  [ ]  Living Will  [ ]   DECISION MAKER(s):  [ x] Health Care Proxy(s)  [ ] Surrogate(s)  [ ] Guardian           Name(s): Phone Number(s): As per pt's daughter Sekou Reveles is HCP    BASELINE (I)ADL(s) (prior to admission):  [place]: [ ]Total  [x ] Moderate [ ]Dependent    Allergies    No Known Allergies    Intolerances    MEDICATIONS  (STANDING):  aspirin enteric coated 81 milliGRAM(s) Oral daily  atorvastatin 80 milliGRAM(s) Oral at bedtime  chlorhexidine 2% Cloths 1 Application(s) Topical <User Schedule>  enoxaparin Injectable 30 milliGRAM(s) SubCutaneous every 24 hours  folic acid 1 milliGRAM(s) Oral daily  furosemide   Injectable 20 milliGRAM(s) IV Push daily  levETIRAcetam 500 milliGRAM(s) Oral two times a day  metoprolol tartrate 25 milliGRAM(s) Oral two times a day  mirtazapine 15 milliGRAM(s) Oral daily  pantoprazole    Tablet 40 milliGRAM(s) Oral before breakfast    MEDICATIONS  (PRN):    PRESENT SYMPTOMS: [ ]Unable to self-report see CPOT, PAINADs, RDOS  Source if other than patient:  [ ]Family   [ ]Team     Pain: [ ]yes [x ]no  QOL impact -   Location -                    Aggravating factors -  Quality -  Radiation -  Timing-  Severity (0-10 scale):  Minimal acceptable level (0-10 scale):       Dyspnea:                           [ ]Mild [ ]Moderate [ ]Severe  Anxiety:                             [ ]Mild [ ]Moderate [ ]Severe  Fatigue:                             [x ]Mild [ ]Moderate [ ]Severe  Nausea:                             [ ]Mild [ ]Moderate [ ]Severe  Loss of appetite:              [x ]Mild [ ]Moderate [ ]Severe  Constipation:                    [ ]Mild [ ]Moderate [ ]Severe    PCSSQ [Palliative Care Spiritual Screening Question]   Severity (0-10):  Score of 4 or > indicate consideration of Chaplaincy referral.  Chaplaincy Referral: [ ] yes [x ] refused [ ] following    Caregiver [place]? : [ ] yes [ ] no [x ] deferred:  Social work referral [ ] Patient & Family Centered Care Referral [ ]     Anticipatory Grief Present?: [ ] yes [ ] no  [x ] deferred: Palliative Social work referral [ ]  Patient & Family Centered Care Referral [ ]       Other Symptoms:  [ x]All other review of systems negative   [ ] Unable to obtain due to poor mentation    PHYSICAL EXAM:  Vital Signs Last 24 Hrs  T(C): 36.6 (04 Aug 2023 04:32), Max: 36.7 (03 Aug 2023 17:02)  T(F): 97.9 (04 Aug 2023 04:32), Max: 98.1 (03 Aug 2023 17:02)  HR: 60 (04 Aug 2023 12:01) (59 - 62)  BP: 114/66 (04 Aug 2023 12:01) (106/64 - 133/70)  BP(mean): --  RR: 18 (04 Aug 2023 12:01) (18 - 19)  SpO2: 97% (04 Aug 2023 12:01) (95% - 98%)    Parameters below as of 04 Aug 2023 12:01  Patient On (Oxygen Delivery Method): room air     I&O's Summary    03 Aug 2023 07:01  -  04 Aug 2023 07:00  --------------------------------------------------------  IN: 200 mL / OUT: 50 mL / NET: 150 mL    04 Aug 2023 07:01  -  04 Aug 2023 15:12  --------------------------------------------------------  IN: 200 mL / OUT: 0 mL / NET: 200 mL        GENERAL:  [x]Alert  [x]Oriented x 3  [ ]Lethargic  [ ]Cachexia  [ ]Unarousable  [x]Verbal  [ ]Non-Verbal  Behavioral:   [ ]Anxiety  [ ]Delirium [ ]Agitation [ ]Other  HEENT:  [x]Normal   [ ]Dry mouth   [ ]ET Tube/Trach  [ ]Oral lesions  PULMONARY:   [x]Clear [ ]Tachypnea  [ ]Audible excessive secretions   [ ]Rhonchi        [ ]Right [ ]Left [ ]Bilateral  [ ]Crackles        [ ]Right [ ]Left [ ]Bilateral  [ ]Wheezing     [ ]Right [ ]Left [ ]Bilateral  [ ]Diminished BS [ ] Right [ ]Left [ ]Bilateral  CARDIOVASCULAR:    [x]Regular [ ]Irregular [ ]Tachy  [ ]Melo [ ]Murmur [ ]Other  GASTROINTESTINAL:  [x]Soft  [ ]Distended   [x]+BS  [x]Non tender [ ]Tender  [ ]PEG [ ]OGT/ NGT   Last BM:    GENITOURINARY:  [ ]Normal [x ]Incontinent   [ ]Oliguria/Anuria   [ ][place]inez  MUSCULOSKELETAL:   [ ]Normal   [x]Weakness  [ ]Bed/Wheelchair bound [ ]Edema  NEUROLOGIC:   [x]No focal deficits  [ ] Cognitive impairment  [ ] Dysphagia [ ]Dysarthria [ ] Paresis [ ]Other   SKIN:   [x]Normal  [ ]Rash   [ ]Pressure ulcer(s) [ ]y [ ]n present on admission    CRITICAL CARE:  [ ] Shock Present  [ ]Septic [ ]Cardiogenic [ ]Neurologic [ ]Hypovolemic  [ ]  Vasopressors [ ]  Inotropes   [ ]Respiratory failure present [ ]Mechanical ventilation [ ]Non-invasive ventilatory support [ ]High flow    [ ]Acute  [ ]Chronic [ ]Hypoxic  [ ]Hypercarbic [ ]Other  [ ]Other organ failure     LABS:                        10.2   7.23  )-----------( 232      ( 02 Aug 2023 17:57 )             32.1   08-04    145  |  110<H>  |  35<H>  ----------------------------<  75  3.9   |  19<L>  |  1.82<H>    Ca    9.1      04 Aug 2023 06:13  Phos  4.0     08-04  Mg     2.0     08-04    TPro  6.7  /  Alb  2.6<L>  /  TBili  0.5  /  DBili  x   /  AST  20  /  ALT  10  /  AlkPhos  100  08-02      Urinalysis Basic - ( 04 Aug 2023 06:13 )    Color: x / Appearance: x / SG: x / pH: x  Gluc: 75 mg/dL / Ketone: x  / Bili: x / Urobili: x   Blood: x / Protein: x / Nitrite: x   Leuk Esterase: x / RBC: x / WBC x   Sq Epi: x / Non Sq Epi: x / Bacteria: x      RADIOLOGY & ADDITIONAL STUDIES:  < from: CT Chest No Cont (08.02.23 @ 21:23) >    ACC: [id number] EXAM:  CT CHEST   ORDERED BY: SENTHIL WYNNE     PROCEDURE DATE:  08/02/2023          INTERPRETATION:  CLINICAL INFORMATION: Cough    COMPARISON: Renal ultrasound 6/29/2023.    CONTRAST/COMPLICATIONS:  IV Contrast: NONE  Oral Contrast: NONE  Complications: None reported at time of study completion    PROCEDURE:  CT of the Chest was performed.  Sagittal and coronal reformats were performed.    FINDINGS:    LUNGS PLEURA AND AIRWAYS: Patent central airways.  Small right effusion   with associated atelectasis. Trace left effusion with associated   atelectasis.  Azygos lobe, and normal variant.  There are scattered tiny 2 mm pulmonary nodules for example in the left   upper lobe (3:42). There are no dense consolidations.  MEDIASTINUM AND ASHLYN: No lymphadenopathy.  VESSELS: Atherosclerotic changes  HEART: Sternotomy. Cardiac leads. Coronary artery and valvular   calcifications. Heart size is normal. No pericardial effusion.  CHEST WALL AND LOWER NECK: Within normal limits.  VISUALIZED UPPER ABDOMEN: Within normal limits.  BONES: Degenerative changes. Old posterior right rib fractures.    IMPRESSION:    Bilateral pleural effusions with associated atelectasis as described   above. No definite evidence for pneumonia/infection.      --- End of Report ---            IVY CHACKO MD; Attending Radiologist  This document has been electronically signed. Aug  2 2023 10:29PM    < end of copied text >    PROTEIN CALORIE MALNUTRITION PRESENT: [ ]mild [ ]moderate [ ]severe [ ]underweight [ ]morbid obesity  https://www.andeal.org/vault/2440/web/files/ONC/Table_Clinical%20Characteristics%20to%20Document%20Malnutrition-White%20JV%20et%20al%202012.pdf    Height (cm): 175.3 (08-02-23 @ 17:21), 175.3 (06-28-23 @ 15:50)  Weight (kg): 77.1 (08-02-23 @ 17:21), 77.1 (06-28-23 @ 15:50)  BMI (kg/m2): 25.1 (08-02-23 @ 17:21), 25.1 (06-28-23 @ 15:50)    [ ]PPSV2 < or = to 30% [ ]significant weight loss  [ ]poor nutritional intake  [ ]anasarca[ ]Artificial Nutrition      Other REFERRALS:  [ ]Hospice  [ ]Child Life  [ ]Social Work  [ ]Case management [ ]Holistic Therapy     Care Coordination Assessment 201 [C. Provider] (06-29-23 @ 12:57)      Palliative Performance Scale:  http://npcrc.org/files/news/palliative_performance_scale_ppsv2.pdf  (Ctrl +  left click to view)  Respiratory Distress Observation Tool:  https://homecareinformation.net/handouts/hen/Respiratory_Distress_Observation_Scale.pdf (Ctrl +  left click to view)  PAINAD Score:  http://geriatrictoolkit.[place].[facility]/cog/painad.pdf (Ctrl +  left click to view)

## 2023-08-04 NOTE — PHYSICAL THERAPY INITIAL EVALUATION ADULT - SITTING BALANCE: STATIC
fair balance poor body awareness in space lists to right and back and cannot self correct with verbal cuesNT/poor plus

## 2023-08-04 NOTE — PHYSICAL THERAPY INITIAL EVALUATION ADULT - IMPAIRED TRANSFERS: SIT/STAND, REHAB EVAL
Pt lists to Right side and posteriorly.  Requires constant cueing to adjust posture./impaired balance/impaired postural control/decreased strength

## 2023-08-04 NOTE — PROGRESS NOTE ADULT - SUBJECTIVE AND OBJECTIVE BOX
Date of Service: 08-04-23 @ 08:04           CARDIOLOGY     PROGRESS  NOTE   ________________________________________________    CHIEF COMPLAINT:Patient is a 89y old  Male who presents with a chief complaint of FTT (04 Aug 2023 07:57)  no complain  	  REVIEW OF SYSTEMS:  CONSTITUTIONAL: No fever, weight loss, or fatigue  EYES: No eye pain, visual disturbances, or discharge  ENT:  No difficulty hearing, tinnitus, vertigo; No sinus or throat pain  NECK: No pain or stiffness  RESPIRATORY: No cough, wheezing, chills or hemoptysis; + Shortness of Breath  CARDIOVASCULAR: No chest pain, palpitations, passing out, dizziness, or leg swelling  GASTROINTESTINAL: No abdominal or epigastric pain. No nausea, vomiting, or hematemesis; No diarrhea or constipation. No melena or hematochezia.  GENITOURINARY: No dysuria, frequency, hematuria, or incontinence  NEUROLOGICAL: No headaches, memory loss, loss of strength, numbness, or tremors  SKIN: No itching, burning, rashes, or lesions   LYMPH Nodes: No enlarged glands  ENDOCRINE: No heat or cold intolerance; No hair loss  MUSCULOSKELETAL: No joint pain or swelling; No muscle, back, or extremity pain  PSYCHIATRIC: No depression, anxiety, mood swings, or difficulty sleeping  HEME/LYMPH: No easy bruising, or bleeding gums  ALLERGY AND IMMUNOLOGIC: No hives or eczema	    [x ] All others negative	  [ ] Unable to obtain    PHYSICAL EXAM:  T(C): 36.6 (08-04-23 @ 04:32), Max: 36.7 (08-03-23 @ 17:02)  HR: 59 (08-04-23 @ 04:32) (59 - 62)  BP: 114/70 (08-04-23 @ 04:32) (106/62 - 133/70)  RR: 18 (08-04-23 @ 04:32) (18 - 19)  SpO2: 98% (08-04-23 @ 04:32) (94% - 98%)  Wt(kg): --  I&O's Summary    03 Aug 2023 07:01  -  04 Aug 2023 07:00  --------------------------------------------------------  IN: 200 mL / OUT: 50 mL / NET: 150 mL        Appearance: Normal	  HEENT:   Normal oral mucosa, PERRL, EOMI	  Lymphatic: No lymphadenopathy  Cardiovascular: Normal S1 S2, No JVD, +murmurs, No edema  Respiratory: decrease bs  Gastrointestinal:  Soft, Non-tender, + BS	  Skin: No rashes, No ecchymoses, No cyanosis	  Neurologic: Non-focal  Extremities: Normal range of motion, No clubbing, cyanosis or edema  Vascular: Peripheral pulses palpable 2+ bilaterally    MEDICATIONS  (STANDING):  aspirin enteric coated 81 milliGRAM(s) Oral daily  atorvastatin 80 milliGRAM(s) Oral at bedtime  enoxaparin Injectable 40 milliGRAM(s) SubCutaneous every 24 hours  folic acid 1 milliGRAM(s) Oral daily  furosemide   Injectable 20 milliGRAM(s) IV Push two times a day  levETIRAcetam 500 milliGRAM(s) Oral two times a day  metoprolol tartrate 25 milliGRAM(s) Oral two times a day  mirtazapine 15 milliGRAM(s) Oral daily  pantoprazole    Tablet 40 milliGRAM(s) Oral before breakfast      TELEMETRY: 	    ECG:  	  RADIOLOGY:  OTHER: 	  	  LABS:	 	    CARDIAC MARKERS:                                10.2   7.23  )-----------( 232      ( 02 Aug 2023 17:57 )             32.1     08-04    145  |  110<H>  |  35<H>  ----------------------------<  75  3.9   |  19<L>  |  1.82<H>    Ca    9.1      04 Aug 2023 06:13  Phos  4.0     08-04  Mg     2.0     08-04    TPro  6.7  /  Alb  2.6<L>  /  TBili  0.5  /  DBili  x   /  AST  20  /  ALT  10  /  AlkPhos  100  08-02    proBNP:   Lipid Profile:   HgA1c:   TSH:       < from: TTE W or WO Ultrasound Enhancing Agent (08.03.23 @ 12:26) >   1. The left ventricular wall thickness is normal. The left ventricular systolic function is mildly decreased with an ejectionfraction of 44 % by Marquez's method of disks. There are no regional wall motion abnormalities seen. No evidence of a thrombus in the left ventricle.   2. There is normal left ventricular diastolic function, with normal filling pressure.   3. Normal atria.   4. Normal right ventricular cavity size, normal right ventricular wall thickness and normal right ventricular systolic function. The tricuspid annular plane systolic excursion (TAPSE) is 0.9 cm (normal >=1.7 cm).   5. There is calcification of the aortic valve leaflets. There is severe aortic stenosis. There is low flow, low gradient aortic stenosis with reduced EF. The peak transaortic velocity is 1.88 m/s, peak transaortic gradient is 14.1 mmHg and mean transaortic gradient is 6.0 mmHg with an LVOT/aortic valve VTI ratio of 0.25. The aortic valve area is estimated at 0.87 cm² by the continuity equation. There is mild aortic regurgitation.   6. There is calcification of the mitral valve annulus. There is mild mitral regurgitation.   7. There is mild tricuspid regurgitation. Estimated pulmonary artery systolic pressure is 25 mmHg.   8. No pericardial effusion seen.      < from: CT Chest No Cont (08.02.23 @ 21:23) >    Bilateral pleural effusions with associated atelectasis as described   above. No definite evidence for pneumonia/infection.      Assessment and plan  ---------------------------  89M w/ hx of CAD s/p CABG, s/p PPM, CHF, seizures, CKD, anemia, HTN, recent UTI p/w decreased PO and weakness. Pt has been very weak and almost bed bound for the past month. Pt has hx of UTIs requiring admission in the past. Around June 4th daughter became concerned over change in pt's mood and awareness. Called PMD on phone and was prescribed 1 week course of Levaquin. Pt did not seem to improve significantly and pt went to see urologist around 6/14. Reportedly urine was checked and pt was switched to Bactrim. Pt otherwise compliant with meds as they are provided by [facility].    chf ?hfpef  check TTE  continue current meds  Lasix 20 mg iv bid  ct chest no contrast noted with bl pleural effusion  will adjust cardiac meds  tele  cardiac enzyme  anemia, check guaiac check cbc  Protonix  abx for UTI  awaiting blood work  echo noted with SEVERE AS as the cause of chf and hx of CAD, need to discuss with family and health care proxy  continue gentle diuresis  Avoid hypotension/ ACRE or ARB

## 2023-08-04 NOTE — PHYSICAL THERAPY INITIAL EVALUATION ADULT - NSPTDISCHREC_GEN_A_CORE
Pt to return to ANCELMO to increase balance, posture, trunk control, endurance, ambulation with rolling walker and all bed mobility and transfers./Sub-acute Rehab

## 2023-08-04 NOTE — CONSULT NOTE ADULT - CONVERSATION DETAILS
Initially spoke with the patient's daughter Anushka via telephone. Introduced myself and the role of palliative care. Anushka open to speaking with me, but did share her sister is designated HCP. Requested that when she is able to provide HCP paperwork, to please bring it in. Anushka shared with me that her father has had a physical decline over the past few months and recurrent UTIs. She shared that her father had 24/7 HHA who assist with ADLs and care. He previously enjoyed going for walks with his walker and visiting with grandchildren, but has been primarily bedbound for the past month. Anushka had questions regarding the difference between palliative and hospice. All questions answered, discussed qualifications for inpatient vs home hospice. At this time pt would be a candidate for home hospice only. Palliative home care option also discussed. Explored advanced directives and if the family had ever discussed code status. At this time Anushka wanted to defer to Sekou and her father.     Met with patient at bedside. Introduced myself and role of palliative care. Maximo has a good understanding as to why he is in the hospital and is feeling frustrated with his recurrent UTIs. When exploring his goals of hospitalization he shared that he would ultimately like to go back to rehab to try and get stronger but is frustrated as he has been readmitted to the hospital after being in rehab. Discussed advanced directives including DNR/I. Maximo was open to discussing code status stating he has thought about this a lot lately due to his age and what he feels is a major decline in his quality of life. Maximo shared that he would not want to complete paperwork without speaking to his children but would be open to continuing the [facility] conversation. 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 Maximo appreciative of visit. Emotional support provided.     Voicemail left for Sekou with palliative care contact info Normal vision: sees adequately in most situations; can see medication labels, newsprint

## 2023-08-04 NOTE — CONSULT NOTE ADULT - ASSESSMENT
89M w/ hx of CAD s/p CABG, s/p PPM, CHF, seizures, CKD, anemia, HTN, recent UTI p/w decreased PO and weakness. Pt has been very weak and almost bed bound for the past month. Pt has hx of UTIs requiring admission in the past. Around June 4th daughter became concerned over change in pt's mood and awareness. Called PMD on phone and was prescribed 1 week course of Levaquin. Pt did not seem to improve significantly and pt went to see urologist around 6/14. Reportedly urine was checked and pt was switched to Bactrim. Pt otherwise compliant with meds as they are provided by [facility].  (02 Aug 2023 20:38). Palliative care consulted for [facility]

## 2023-08-04 NOTE — CONSULT NOTE ADULT - PROBLEM SELECTOR RECOMMENDATION 9
ppsv 60%- pt working with PT and oob to chair at time of exam, last month patient has been primarily bed bound   has 24/7 HHA

## 2023-08-04 NOTE — PHYSICAL THERAPY INITIAL EVALUATION ADULT - IMPAIRMENTS CONTRIBUTING IMPAIRED BED MOBILITY, REHAB EVAL
Pt lists to Right side and posteriorly.  Requires constant cueing to adjust posture./impaired balance/impaired motor control/impaired postural control/decreased strength

## 2023-08-04 NOTE — CONSULT NOTE ADULT - PROBLEM SELECTOR RECOMMENDATION 3
c/w IV fluids as tolerated  c/w remeron   f/u with the dietitian, and monitor weight.  discussed with patient small frequent meals as tolerated are best for increased po intake   ongoing GOC

## 2023-08-04 NOTE — PROGRESS NOTE ADULT - SUBJECTIVE AND OBJECTIVE BOX
Patient is a 89y Male whom presented to the hospital with ckd stage3     PAST MEDICAL & SURGICAL HISTORY:  CHF, chronic      CAD (coronary artery disease)      Essential hypertension      FH: CABG (coronary artery bypass surgery)          MEDICATIONS  (STANDING):  aspirin enteric coated 81 milliGRAM(s) Oral daily  atorvastatin 80 milliGRAM(s) Oral at bedtime  enoxaparin Injectable 40 milliGRAM(s) SubCutaneous every 24 hours  folic acid 1 milliGRAM(s) Oral daily  furosemide   Injectable 20 milliGRAM(s) IV Push two times a day  levETIRAcetam 500 milliGRAM(s) Oral two times a day  metoprolol tartrate 25 milliGRAM(s) Oral two times a day  mirtazapine 15 milliGRAM(s) Oral daily  pantoprazole    Tablet 40 milliGRAM(s) Oral before breakfast      Allergies    No Known Allergies    Intolerances        SOCIAL HISTORY:  Denies ETOh,Smoking,     FAMILY HISTORY:      REVIEW OF SYSTEMS:    CONSTITUTIONAL: No weakness, fevers or chills  RESPIRATORY: No cough, wheezing, hemoptysis; No shortness of breath  CARDIOVASCULAR: No chest pain or palpitations  GASTROINTESTINAL: No abdominal or epigastric pain. No nausea, vomiting,     No diarrhea or constipation. No melena   GENITOURINARY: No dysuria, frequency or hematuria  NEUROLOGICAL: pos  weakness  SKIN: dry                          10.2   7.23  )-----------( 232      ( 02 Aug 2023 17:57 )             32.1       CBC Full  -  ( 02 Aug 2023 17:57 )  WBC Count : 7.23 K/uL  RBC Count : 3.46 M/uL  Hemoglobin : 10.2 g/dL  Hematocrit : 32.1 %  Platelet Count - Automated : 232 K/uL  Mean Cell Volume : 92.8 fl  Mean Cell Hemoglobin : 29.5 pg  Mean Cell Hemoglobin Concentration : 31.8 gm/dL  Auto Neutrophil # : 4.36 K/uL  Auto Lymphocyte # : 1.82 K/uL  Auto Monocyte # : 0.53 K/uL  Auto Eosinophil # : 0.40 K/uL  Auto Basophil # : 0.05 K/uL  Auto Neutrophil % : 60.3 %  Auto Lymphocyte % : 25.2 %  Auto Monocyte % : 7.3 %  Auto Eosinophil % : 5.5 %  Auto Basophil % : 0.7 %      08-04    145  |  110<H>  |  35<H>  ----------------------------<  75  3.9   |  19<L>  |  1.82<H>    Ca    9.1      04 Aug 2023 06:13  Phos  4.0     08-04  Mg     2.0     08-04    TPro  6.7  /  Alb  2.6<L>  /  TBili  0.5  /  DBili  x   /  AST  20  /  ALT  10  /  AlkPhos  100  08-02      CAPILLARY BLOOD GLUCOSE          Vital Signs Last 24 Hrs  T(C): 36.6 (04 Aug 2023 04:32), Max: 36.7 (03 Aug 2023 17:02)  T(F): 97.9 (04 Aug 2023 04:32), Max: 98.1 (03 Aug 2023 17:02)  HR: 59 (04 Aug 2023 04:32) (59 - 62)  BP: 114/70 (04 Aug 2023 04:32) (106/62 - 133/70)  BP(mean): --  RR: 18 (04 Aug 2023 04:32) (18 - 19)  SpO2: 98% (04 Aug 2023 04:32) (94% - 98%)    Parameters below as of 04 Aug 2023 04:32  Patient On (Oxygen Delivery Method): room air        Urinalysis Basic - ( 04 Aug 2023 06:13 )    Color: x / Appearance: x / SG: x / pH: x  Gluc: 75 mg/dL / Ketone: x  / Bili: x / Urobili: x   Blood: x / Protein: x / Nitrite: x   Leuk Esterase: x / RBC: x / WBC x   Sq Epi: x / Non Sq Epi: x / Bacteria: x            PHYSICAL EXAM:    Constitutional: NAD  HEENT: conjunctive   clear   Neck:  No JVD  Respiratory: decrease bs b/l   Cardiovascular: S1 and S2  Gastrointestinal: BS+, soft, NT/ND  Extremities: No peripheral edema  Neurological: A/O x 1, no focal deficits  Psychiatric: Normal mood, normal affect  : No Martinez  Skin: dry   Access: Not applicable    LABS:                        10.2   7.23  )-----------( 232      ( 02 Aug 2023 17:57 )             32.1     08-03    140  |  109<H>  |  32<H>  ----------------------------<  94  5.2   |  15<L>  |  1.60<H>    Ca    9.1      03 Aug 2023 16:26  Phos  3.8     08-03  Mg     2.1     08-03    TPro  6.7  /  Alb  2.6<L>  /  TBili  0.5  /  DBili  x   /  AST  20  /  ALT  10  /  AlkPhos  100  08-02      Urine Studies:  Urinalysis Basic - ( 03 Aug 2023 16:26 )    Color: x / Appearance: x / SG: x / pH: x  Gluc: 94 mg/dL / Ketone: x  / Bili: x / Urobili: x   Blood: x / Protein: x / Nitrite: x   Leuk Esterase: x / RBC: x / WBC x   Sq Epi: x / Non Sq Epi: x / Bacteria: x            RADIOLOGY & ADDITIONAL STUDIES:          MEDICATIONS  (STANDING):  aspirin enteric coated 81 milliGRAM(s) Oral daily  atorvastatin 80 milliGRAM(s) Oral at bedtime  enoxaparin Injectable 40 milliGRAM(s) SubCutaneous every 24 hours  folic acid 1 milliGRAM(s) Oral daily  furosemide   Injectable 20 milliGRAM(s) IV Push two times a day  levETIRAcetam 500 milliGRAM(s) Oral two times a day  metoprolol tartrate 25 milliGRAM(s) Oral two times a day  mirtazapine 15 milliGRAM(s) Oral daily  pantoprazole    Tablet 40 milliGRAM(s) Oral before breakfast

## 2023-08-04 NOTE — PROGRESS NOTE ADULT - ASSESSMENT
89M w/ hx of CAD s/p CABG, s/p PPM, CHF, seizures, CKD, anemia, HTN, recent UTI p/w decreased PO and weakness. Pt has been very weak and almost bed bound for the past month. Pt has hx of UTIs requiring admission in the past. Around June 4th daughter became concerned over change in pt's mood and awareness. Called PMD on phone and was prescribed 1 week course of Levaquin. Pt did not seem to improve significantly and pt went to see urologist around 6/14. Reportedly urine was checked and pt was switched to Bactrim. Pt otherwise compliant with meds as they are provided by [facility].  (02 Aug 2023 20:38)      CHRONIC KIDNEY DISEASE, STAGE 3: furosemide   Injectable 20 milliGRAM(s) IV Push two times a day    Serum creatinine is stable at 1.6, approximating a GFR is decreased   ml/min.   There is no progression.  No uremic symptoms. No evidence of  worsening  Anemia. Fluid status stable.   Will continue to avoid nephrotoxic drugs.  Patient remains asymptomatic.  Continue current therapy.      ANEMIA PLAN:  Anemia of chronic disease:  We will consider epo  aiming for a HCT of 32-36 %.   We will monitor Iron stores, B12 and RBC folate .    fluid overload furosemide   Injectable 20 milliGRAM(s) IV Push two times a day    BP monitoring,continue current antihypertensive meds, low salt diet,followup with PMD in 1-2 weeks  metoprolol tartrate 25 milliGRAM(s) Oral two times a day

## 2023-08-04 NOTE — PHYSICAL THERAPY INITIAL EVALUATION ADULT - GENERAL OBSERVATIONS, REHAB EVAL
Pt rec'd semisupine in bed, +condom catheter, in NAD.  Pt cleared for PT session by JEAN MARIE Walker.

## 2023-08-04 NOTE — PHYSICAL THERAPY INITIAL EVALUATION ADULT - PERTINENT HX OF CURRENT PROBLEM, REHAB EVAL
Pt is a 89 year old male with PMH significant for CAD s/p CABG, s/p PPM, CHF, seizures, CKD, anemia, HTN, recent UTI.  Pt had a recent admission 6/28/23 due to daughter  having concerns about pt confusion and feeling weak.  Pt discharged on 7/3 to [facility].  Pt presenting from [facility] after recent admission with decreased PO and weakness and FTT.  Pt reportedly has been very week and almost bed bound for the past month.  Pt being followed by nephrology for stage 3 CKD.  CT Chest (8/3) some pulmonary edema with b/l pleural effusion.  No evidence of pneumonia/infection.

## 2023-08-04 NOTE — PHARMACOTHERAPY INTERVENTION NOTE - COMMENTS
89M w/ hx of CAD s/p CABG, s/p PPM, CHF, seizures, CKD, anemia, HTN, recent UTI p/w decreased PO and weakness. Pt has been very weak and almost bed bound for the past month. Pt has hx of UTIs requiring admission in the past. Around June 4th daughter became concerned over change in pt's mood and awareness. Called PMD on phone and was prescribed 1 week course of Levaquin. Pt did not seem to improve significantly and pt went to see urologist around 6/14. Reportedly urine was checked and pt was switched to Bactrim. Pt otherwise compliant with meds as they are provided by [facility].   He was transferred to ER due to FTT for over last month. He was tried and given supportive IVF and Mirtazapine but not improved. He was found with UTI with ESBL and was treated with IV INVANZ for a few days. He started having cough for  afew days, COVID PCR was negative and CXR did not show any infiltration,     Creatinine Trend: 1.82<--, 1.60<--, 1.43<--    Patient is currently receiving enoxaparin 40mg SC daily. CrCl= 27ml/min. Recommended to change to enoxaparin 30mg SC daily.    Melisa Galan, PharmD  PGY1 Pharmacy Resident  Available on Teams & [phone]

## 2023-08-04 NOTE — CONSULT NOTE ADULT - PROBLEM SELECTOR RECOMMENDATION 6
will continue to follow for GOC  Can be reached by TEAMS M-F 9-5 Loli Vidal Any other time please page [phone] if needed

## 2023-08-04 NOTE — PHYSICAL THERAPY INITIAL EVALUATION ADULT - ADDITIONAL COMMENTS
Per prior PT eval, Patient lives alone with 24 hrs HHA in a 2 level house; as per HHA present at the bedside: 2 steps to enter w/o hand rails, chair lift inside to reach bedroom. Pt. ambulates independently w/RW, needs assistance w/ADLs. As per HHA, its been 2 weeks since pt. ambulated independently. HHA denies any fall.  Pt wears bilateral hearing aids. Pt presents from [facility].  Prior to hospital/rehab stay, patient lives alone with 24 hrs HHA in a 2 level house.  Pt reports 5 steps to enter with handrail, was able to negotiate independently. Pt has chair lift inside to reach bedroom. Pt ambulated independently w/RW, needs assistance w/ADLs.  As of recent, pt has not been ambulating much.  Pt reports working with PTs at [facility], as they were assisting him to ambulate with a rolling walker.  Pt wears bilateral hearing aids, uses glasses all the time, although they were not present at session, and is Right hand dominant.

## 2023-08-04 NOTE — CONSULT NOTE ADULT - PROBLEM SELECTOR RECOMMENDATION 5
see GOC above  pt would like his children to participate along with him in GOC  as per pt he has 4 children: Jesus Reveles, Sekou, Umer Reveles deferred assistance with GOC to Sekou-> VM left for Sekou   as per Anushka, Sekou is designated HCP-> no paperwork on chart, requested family provide HCP documentation

## 2023-08-04 NOTE — PHYSICAL THERAPY INITIAL EVALUATION ADULT - IMPAIRMENTS FOUND, PT EVAL
aerobic capacity/endurance/decreased midline orientation/gait, locomotion, and balance/poor safety awareness/posture

## 2023-08-04 NOTE — PROGRESS NOTE ADULT - ASSESSMENT
89M w/ hx of CAD s/p CABG, s/p PPM, CHF, seizures, CKD, anemia, HTN, recent UTI p/w decreased PO and weakness. Pt has been very weak and almost bed bound for the past month. Pt has hx of UTIs requiring admission in the past. Around June 4th daughter became concerned over change in pt's mood and awareness. Called PMD on phone and was prescribed 1 week course of Levaquin. Pt did not seem to improve significantly and pt went to see urologist around 6/14. Reportedly urine was checked and pt was switched to Bactrim. Pt otherwise compliant with meds as they are provided by [facility].   He was transferred to ER due to FTT for over last month. He was tried and given supportive IVF and Mirtazapine but not improved. He was found with UTI with ESBL and was treated with IV INVANZ for a few days. He started having cough for  afew days, COVID PCR was negative and CXR did not show any infiltration,     FTT   Continue supportive IV fluid, continue mirtazapine,  discuss with the daughter about palliative, awaiting decision,  Discussed with the family starting him on mirtazapine and encourage PO intake, f/u with the dietitian, and monitor weight. Check TSH prealbumin CBC and CMP.   Patient does not wish for any peg tube or TF.     Pulmonary edema  b/l pleural effusion. Continue IV. Lasix. Decrease Lasix  due to elevated Cre.  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 Patient not candidate for surgery. F/u cardiologist and palliative team.     UTI:  E.coli ESBL, treated with IV Invanz.     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     Urinary retention  resolved off meds.    Incidental Lung Nodule   Given tobacco use hx consider repeat imaging as outpatient with PCP    CKD3   Avoid nephrotoxins, renally dose meds, Trend creatinin      DVT ppx   Lovenox 30 mg due to Crcl below 30    HTN:  Taper metoprolol tartrate due to HTN and bradycardia 12.5 MG BID     Hx of SH  According is daughter monitor      Mild PVD:  Continue Aspirin and atorvastatin.      DVT ppx   Lovenox 40 mg SC daily

## 2023-08-04 NOTE — PROGRESS NOTE ADULT - SUBJECTIVE AND OBJECTIVE BOX
Patient is a 89y old  Male who presents with a chief complaint of FTT (04 Aug 2023 15:11)      INTERVAL HPI/OVERNIGHT EVENTS: Cre up to 1.8. Increase Lasix to once a day. Echocardiogram showed ejection fraction of 40% and severe AS. Patient is 94%. Needs palliative care consult . F/u palliative goal of care. Possible comfort care. Lovenox dose decreased to 30 mg based on Cre clearance which is 27.     Pain Location & Control:     MEDICATIONS  (STANDING):  aspirin enteric coated 81 milliGRAM(s) Oral daily  atorvastatin 80 milliGRAM(s) Oral at bedtime  chlorhexidine 2% Cloths 1 Application(s) Topical <User Schedule>  enoxaparin Injectable 30 milliGRAM(s) SubCutaneous every 24 hours  folic acid 1 milliGRAM(s) Oral daily  furosemide   Injectable 20 milliGRAM(s) IV Push daily  levETIRAcetam 500 milliGRAM(s) Oral two times a day  metoprolol tartrate 25 milliGRAM(s) Oral two times a day  mirtazapine 15 milliGRAM(s) Oral daily  pantoprazole    Tablet 40 milliGRAM(s) Oral before breakfast    MEDICATIONS  (PRN):      Allergies    No Known Allergies    Intolerances        REVIEW OF SYSTEMS:  CONSTITUTIONAL: No fever, weight loss, or fatigue  EYES: No eye pain, visual disturbances, or discharge  ENMT:  No difficulty hearing, tinnitus, vertigo; No sinus or throat pain  NECK: No pain or stiffness  BREASTS: No pain, masses, or nipple discharge  RESPIRATORY: No cough, wheezing, chills or hemoptysis; No shortness of breath  CARDIOVASCULAR: No chest pain, palpitations, dizziness, or leg swelling  GASTROINTESTINAL: No abdominal or epigastric pain. No nausea, vomiting, or hematemesis; No diarrhea or constipation. No melena or hematochezia.  GENITOURINARY: No dysuria, frequency, hematuria, or incontinence  NEUROLOGICAL: No headaches, memory loss, loss of strength, numbness, or tremors  SKIN: No itching, burning, rashes, or lesions   LYMPH NODES: No enlarged glands  ENDOCRINE: No heat or cold intolerance; No hair loss; No polydipsia or polyuria  MUSCULOSKELETAL: No back pain  PSYCHIATRIC: No depression, anxiety, mood swings, or difficulty sleeping  HEME/LYMPH: No easy bruising, or bleeding gums  ALLERGY AND IMMUNOLOGIC: No hives or eczema    Vital Signs Last 24 Hrs  T(C): 36.6 (04 Aug 2023 04:32), Max: 36.6 (04 Aug 2023 04:32)  T(F): 97.9 (04 Aug 2023 04:32), Max: 97.9 (04 Aug 2023 04:32)  HR: 62 (04 Aug 2023 18:38) (59 - 62)  BP: 116/70 (04 Aug 2023 18:38) (114/66 - 116/70)  BP(mean): --  RR: 18 (04 Aug 2023 12:01) (18 - 18)  SpO2: 97% (04 Aug 2023 12:01) (96% - 98%)    Parameters below as of 04 Aug 2023 12:01  Patient On (Oxygen Delivery Method): room air        PHYSICAL EXAM:  GENERAL: NAD, well-groomed, well-developed  HEAD:  Atraumatic, Normocephalic  EYES: EOMI, PERRLA, conjunctiva and sclera clear  ENMT: No tonsillar erythema, exudates, or enlargement; Moist mucous membranes, Good dentition, No lesions  NECK: Supple, No JVD, Normal thyroid  NERVOUS SYSTEM:  Alert & Oriented X 2  CHEST/LUNG: Clear to auscultation bilaterally; No rales, rhonchi, wheezing, or rubs  HEART: Regular rate and rhythm; No murmurs, rubs, or gallops  ABDOMEN: Soft, Nontender, Nondistended; Bowel sounds present  EXTREMITIES:  2+ Peripheral Pulses, No clubbing or cyanosis  LYMPH: No lymphadenopathy noted  SKIN: No rashes or lesions      LABS:    04 Aug 2023 06:13    145    |  110    |  35     ----------------------------<  75     3.9     |  19     |  1.82     Ca    9.1        04 Aug 2023 06:13  Phos  4.0       04 Aug 2023 06:13  Mg     2.0       04 Aug 2023 06:13        Urinalysis Basic - ( 04 Aug 2023 06:13 )    Color: x / Appearance: x / SG: x / pH: x  Gluc: 75 mg/dL / Ketone: x  / Bili: x / Urobili: x   Blood: x / Protein: x / Nitrite: x   Leuk Esterase: x / RBC: x / WBC x   Sq Epi: x / Non Sq Epi: x / Bacteria: x      CAPILLARY BLOOD GLUCOSE            Cultures      RADIOLOGY & ADDITIONAL TESTS:    Imaging Personally Reviewed:  [X ] YES  [ ] NO    Consultant(s) Notes Reviewed:  [ X] YES  [ ] NO    Care Discussed with Consultants/Other Providers [ X] YES  [ ] NO

## 2023-08-05 LAB
ANION GAP SERPL CALC-SCNC: 18 MMOL/L — HIGH (ref 5–17)
BUN SERPL-MCNC: 42 MG/DL — HIGH (ref 7–23)
CALCIUM SERPL-MCNC: 9 MG/DL — SIGNIFICANT CHANGE UP (ref 8.4–10.5)
CHLORIDE SERPL-SCNC: 107 MMOL/L — SIGNIFICANT CHANGE UP (ref 96–108)
CO2 SERPL-SCNC: 18 MMOL/L — LOW (ref 22–31)
CREAT SERPL-MCNC: 1.94 MG/DL — HIGH (ref 0.5–1.3)
EGFR: 31 ML/MIN/1.73M2 — LOW
GLUCOSE SERPL-MCNC: 62 MG/DL — LOW (ref 70–99)
HCT VFR BLD CALC: 33 % — LOW (ref 39–50)
HGB BLD-MCNC: 10.3 G/DL — LOW (ref 13–17)
MCHC RBC-ENTMCNC: 29.2 PG — SIGNIFICANT CHANGE UP (ref 27–34)
MCHC RBC-ENTMCNC: 31.2 GM/DL — LOW (ref 32–36)
MCV RBC AUTO: 93.5 FL — SIGNIFICANT CHANGE UP (ref 80–100)
MRSA PCR RESULT.: DETECTED
NRBC # BLD: 0 /100 WBCS — SIGNIFICANT CHANGE UP (ref 0–0)
PLATELET # BLD AUTO: 313 K/UL — SIGNIFICANT CHANGE UP (ref 150–400)
POTASSIUM SERPL-MCNC: 3.6 MMOL/L — SIGNIFICANT CHANGE UP (ref 3.5–5.3)
POTASSIUM SERPL-SCNC: 3.6 MMOL/L — SIGNIFICANT CHANGE UP (ref 3.5–5.3)
RBC # BLD: 3.53 M/UL — LOW (ref 4.2–5.8)
RBC # FLD: 15.2 % — HIGH (ref 10.3–14.5)
S AUREUS DNA NOSE QL NAA+PROBE: DETECTED
SODIUM SERPL-SCNC: 143 MMOL/L — SIGNIFICANT CHANGE UP (ref 135–145)
WBC # BLD: 8.04 K/UL — SIGNIFICANT CHANGE UP (ref 3.8–10.5)
WBC # FLD AUTO: 8.04 K/UL — SIGNIFICANT CHANGE UP (ref 3.8–10.5)

## 2023-08-05 RX ORDER — MUPIROCIN 20 MG/G
1 OINTMENT TOPICAL
Refills: 0 | Status: COMPLETED | OUTPATIENT
Start: 2023-08-05 | End: 2023-08-10

## 2023-08-05 RX ADMIN — PANTOPRAZOLE SODIUM 40 MILLIGRAM(S): 20 TABLET, DELAYED RELEASE ORAL at 06:01

## 2023-08-05 RX ADMIN — MIRTAZAPINE 15 MILLIGRAM(S): 45 TABLET, ORALLY DISINTEGRATING ORAL at 11:24

## 2023-08-05 RX ADMIN — Medication 25 MILLIGRAM(S): at 17:38

## 2023-08-05 RX ADMIN — LEVETIRACETAM 500 MILLIGRAM(S): 250 TABLET, FILM COATED ORAL at 17:38

## 2023-08-05 RX ADMIN — Medication 25 MILLIGRAM(S): at 06:01

## 2023-08-05 RX ADMIN — MUPIROCIN 1 APPLICATION(S): 20 OINTMENT TOPICAL at 18:13

## 2023-08-05 RX ADMIN — ENOXAPARIN SODIUM 30 MILLIGRAM(S): 100 INJECTION SUBCUTANEOUS at 17:39

## 2023-08-05 RX ADMIN — Medication 1 MILLIGRAM(S): at 11:24

## 2023-08-05 RX ADMIN — CHLORHEXIDINE GLUCONATE 1 APPLICATION(S): 213 SOLUTION TOPICAL at 06:02

## 2023-08-05 RX ADMIN — Medication 81 MILLIGRAM(S): at 11:24

## 2023-08-05 RX ADMIN — ATORVASTATIN CALCIUM 80 MILLIGRAM(S): 80 TABLET, FILM COATED ORAL at 21:07

## 2023-08-05 RX ADMIN — Medication 20 MILLIGRAM(S): at 06:01

## 2023-08-05 RX ADMIN — LEVETIRACETAM 500 MILLIGRAM(S): 250 TABLET, FILM COATED ORAL at 06:01

## 2023-08-05 NOTE — PROGRESS NOTE ADULT - SUBJECTIVE AND OBJECTIVE BOX
Patient is a 89y Male whom presented to the hospital with ckd stage3     PAST MEDICAL & SURGICAL HISTORY:  CHF, chronic      CAD (coronary artery disease)      Essential hypertension      FH: CABG (coronary artery bypass surgery)          MEDICATIONS  (STANDING):  aspirin enteric coated 81 milliGRAM(s) Oral daily  atorvastatin 80 milliGRAM(s) Oral at bedtime  enoxaparin Injectable 40 milliGRAM(s) SubCutaneous every 24 hours  folic acid 1 milliGRAM(s) Oral daily  furosemide   Injectable 20 milliGRAM(s) IV Push two times a day  levETIRAcetam 500 milliGRAM(s) Oral two times a day  metoprolol tartrate 25 milliGRAM(s) Oral two times a day  mirtazapine 15 milliGRAM(s) Oral daily  pantoprazole    Tablet 40 milliGRAM(s) Oral before breakfast      Allergies    No Known Allergies    Intolerances        SOCIAL HISTORY:  Denies ETOh,Smoking,     FAMILY HISTORY:      REVIEW OF SYSTEMS:    CONSTITUTIONAL: No weakness, fevers or chills  RESPIRATORY: No cough, wheezing, hemoptysis; No shortness of breath  CARDIOVASCULAR: No chest pain or palpitations  GASTROINTESTINAL: No abdominal or epigastric pain. No nausea, vomiting,     No diarrhea or constipation. No melena   GENITOURINARY: No dysuria, frequency or hematuria  NEUROLOGICAL: pos  weakness  SKIN: dry                                                10.3   8.04  )-----------( 313      ( 05 Aug 2023 07:13 )             33.0       CBC Full  -  ( 05 Aug 2023 07:13 )  WBC Count : 8.04 K/uL  RBC Count : 3.53 M/uL  Hemoglobin : 10.3 g/dL  Hematocrit : 33.0 %  Platelet Count - Automated : 313 K/uL  Mean Cell Volume : 93.5 fl  Mean Cell Hemoglobin : 29.2 pg  Mean Cell Hemoglobin Concentration : 31.2 gm/dL  Auto Neutrophil # : x  Auto Lymphocyte # : x  Auto Monocyte # : x  Auto Eosinophil # : x  Auto Basophil # : x  Auto Neutrophil % : x  Auto Lymphocyte % : x  Auto Monocyte % : x  Auto Eosinophil % : x  Auto Basophil % : x      08-05    143  |  107  |  42<H>  ----------------------------<  62<L>  3.6   |  18<L>  |  1.94<H>    Ca    9.0      05 Aug 2023 07:07  Phos  4.0     08-04  Mg     2.0     08-04        CAPILLARY BLOOD GLUCOSE          Vital Signs Last 24 Hrs  T(C): 36.7 (05 Aug 2023 11:26), Max: 36.7 (05 Aug 2023 11:26)  T(F): 98.1 (05 Aug 2023 11:26), Max: 98.1 (05 Aug 2023 11:26)  HR: 60 (05 Aug 2023 17:35) (59 - 115)  BP: 143/75 (05 Aug 2023 17:35) (97/61 - 143/75)  BP(mean): --  RR: 18 (05 Aug 2023 11:26) (18 - 18)  SpO2: 94% (05 Aug 2023 11:26) (91% - 96%)    Parameters below as of 05 Aug 2023 11:26  Patient On (Oxygen Delivery Method): room air        Urinalysis Basic - ( 05 Aug 2023 07:07 )    Color: x / Appearance: x / SG: x / pH: x  Gluc: 62 mg/dL / Ketone: x  / Bili: x / Urobili: x   Blood: x / Protein: x / Nitrite: x   Leuk Esterase: x / RBC: x / WBC x   Sq Epi: x / Non Sq Epi: x / Bacteria: x            Urinalysis Basic - ( 04 Aug 2023 06:13 )    Color: x / Appearance: x / SG: x / pH: x  Gluc: 75 mg/dL / Ketone: x  / Bili: x / Urobili: x   Blood: x / Protein: x / Nitrite: x   Leuk Esterase: x / RBC: x / WBC x   Sq Epi: x / Non Sq Epi: x / Bacteria: x            PHYSICAL EXAM:    Constitutional: NAD  HEENT: conjunctive   clear   Neck:  No JVD  Respiratory: decrease bs b/l   Cardiovascular: S1 and S2  Gastrointestinal: BS+, soft, NT/ND  Extremities: No peripheral edema  Neurological: A/O x 1, no focal deficits  Psychiatric: Normal mood, normal affect  : No Martinez  Skin: dry   Access: Not applicable    LABS:                        10.2   7.23  )-----------( 232      ( 02 Aug 2023 17:57 )             32.1     08-03    140  |  109<H>  |  32<H>  ----------------------------<  94  5.2   |  15<L>  |  1.60<H>    Ca    9.1      03 Aug 2023 16:26  Phos  3.8     08-03  Mg     2.1     08-03    TPro  6.7  /  Alb  2.6<L>  /  TBili  0.5  /  DBili  x   /  AST  20  /  ALT  10  /  AlkPhos  100  08-02      Urine Studies:  Urinalysis Basic - ( 03 Aug 2023 16:26 )    Color: x / Appearance: x / SG: x / pH: x  Gluc: 94 mg/dL / Ketone: x  / Bili: x / Urobili: x   Blood: x / Protein: x / Nitrite: x   Leuk Esterase: x / RBC: x / WBC x   Sq Epi: x / Non Sq Epi: x / Bacteria: x            RADIOLOGY & ADDITIONAL STUDIES:          MEDICATIONS  (STANDING):  aspirin enteric coated 81 milliGRAM(s) Oral daily  atorvastatin 80 milliGRAM(s) Oral at bedtime  enoxaparin Injectable 40 milliGRAM(s) SubCutaneous every 24 hours  folic acid 1 milliGRAM(s) Oral daily  furosemide   Injectable 20 milliGRAM(s) IV Push two times a day  levETIRAcetam 500 milliGRAM(s) Oral two times a day  metoprolol tartrate 25 milliGRAM(s) Oral two times a day  mirtazapine 15 milliGRAM(s) Oral daily  pantoprazole    Tablet 40 milliGRAM(s) Oral before breakfast

## 2023-08-05 NOTE — DIETITIAN INITIAL EVALUATION ADULT - PERTINENT MEDS FT
MEDICATIONS  (STANDING):  aspirin enteric coated 81 milliGRAM(s) Oral daily  atorvastatin 80 milliGRAM(s) Oral at bedtime  chlorhexidine 2% Cloths 1 Application(s) Topical <User Schedule>  enoxaparin Injectable 30 milliGRAM(s) SubCutaneous every 24 hours  folic acid 1 milliGRAM(s) Oral daily  furosemide   Injectable 20 milliGRAM(s) IV Push daily  levETIRAcetam 500 milliGRAM(s) Oral two times a day  metoprolol tartrate 25 milliGRAM(s) Oral two times a day  mirtazapine 15 milliGRAM(s) Oral daily  pantoprazole    Tablet 40 milliGRAM(s) Oral before breakfast

## 2023-08-05 NOTE — PROGRESS NOTE ADULT - ASSESSMENT
89M w/ hx of CAD s/p CABG, s/p PPM, CHF, seizures, CKD, anemia, HTN, recent UTI p/w decreased PO and weakness. Pt has been very weak and almost bed bound for the past month. Pt has hx of UTIs requiring admission in the past. Around June 4th daughter became concerned over change in pt's mood and awareness. Called PMD on phone and was prescribed 1 week course of Levaquin. Pt did not seem to improve significantly and pt went to see urologist around 6/14. Reportedly urine was checked and pt was switched to Bactrim. Pt otherwise compliant with meds as they are provided by [facility].  (02 Aug 2023 20:38)      CHRONIC KIDNEY DISEASE, STAGE 3: furosemide   Injectable 20 milliGRAM(s) IV Push two times a day    Serum creatinine is stable at 1.6, approximating a GFR is decreased   ml/min.   There is no progression.  No uremic symptoms. No evidence of  worsening  Anemia. Fluid status stable.   Will continue to avoid nephrotoxic drugs.  Patient remains asymptomatic.  Continue current therapy.      ANEMIA PLAN:  Anemia of chronic disease:  We will consider epo  aiming for a HCT of 32-36 %.   We will monitor Iron stores, B12 and RBC folate .    fluid overload furosemide   Injectable 20 milliGRAM(s) IV Push two times a day    BP monitoring,continue current antihypertensive meds, low salt diet,followup with PMD in 1-2 weeks  metoprolol tartrate 25 milliGRAM(s) Oral two times a day

## 2023-08-05 NOTE — DIETITIAN INITIAL EVALUATION ADULT - NSFNSGIIOFT_GEN_A_CORE
no reported nausea/vomiting/constipation/diarrhea. no bowel movement noted on flow sheets since admission.

## 2023-08-05 NOTE — DIETITIAN INITIAL EVALUATION ADULT - NS FNS WEIGHT CHANGE REASON
6/30/23: RD obtained bed scale weight: 127 pounds   UBW: 143 pounds   Dosing weight this admission:170 pounds - ? accuracy. Pt appears closer to ~127 pounds.   RD will continue to monitor trends.

## 2023-08-05 NOTE — DIETITIAN INITIAL EVALUATION ADULT - ADD RECOMMEND
1) Will continue to monitor PO intake, weight, labs, skin, GI status and diet 2) Continue Mighty Shake with meals to aid in meeting nutrient needs - vanilla. 3) continue remeron to aid in appetite stimulation 4) monitor need for alternate means of nutrition such as EN per pt, family, team preferences. 5) nutrition risk placed in chart

## 2023-08-05 NOTE — DIETITIAN INITIAL EVALUATION ADULT - PERTINENT LABORATORY DATA
08-05    143  |  107  |  42<H>  ----------------------------<  62<L>  3.6   |  18<L>  |  1.94<H>    Ca    9.0      05 Aug 2023 07:07  Phos  4.0     08-04  Mg     2.0     08-04    A1C with Estimated Average Glucose Result: See Note (08-04-23 @ 06:14)

## 2023-08-05 NOTE — DIETITIAN INITIAL EVALUATION ADULT - ORAL INTAKE PTA/DIET HISTORY
visited pt at bedside this afternoon, daughter present. reports poor PO intake PTA. Per dietitian initial evaluation 6/30: "Pt's aide requesting mighty shakes TID, reports pt does not like Ensure shakes." Patient reports to want vanilla shakes, currently receiving Mighty shakes.

## 2023-08-05 NOTE — PROGRESS NOTE ADULT - SUBJECTIVE AND OBJECTIVE BOX
Date of Service: 08-05-23 @ 09:32           CARDIOLOGY     PROGRESS  NOTE   ________________________________________________    CHIEF COMPLAINT:Patient is a 89y old  Male who presents with a chief complaint of FTT (04 Aug 2023 18:55)  comfortable  	  REVIEW OF SYSTEMS:  CONSTITUTIONAL: No fever, weight loss, or fatigue  EYES: No eye pain, visual disturbances, or discharge  ENT:  No difficulty hearing, tinnitus, vertigo; No sinus or throat pain  NECK: No pain or stiffness  RESPIRATORY: No cough, wheezing, chills or hemoptysis; No Shortness of Breath  CARDIOVASCULAR: No chest pain, palpitations, passing out, dizziness, or leg swelling  GASTROINTESTINAL: No abdominal or epigastric pain. No nausea, vomiting, or hematemesis; No diarrhea or constipation. No melena or hematochezia.  GENITOURINARY: No dysuria, frequency, hematuria, or incontinence  NEUROLOGICAL: No headaches, memory loss, loss of strength, numbness, or tremors  SKIN: No itching, burning, rashes, or lesions   LYMPH Nodes: No enlarged glands  ENDOCRINE: No heat or cold intolerance; No hair loss  MUSCULOSKELETAL: No joint pain or swelling; No muscle, back, or extremity pain  PSYCHIATRIC: No depression, anxiety, mood swings, or difficulty sleeping  HEME/LYMPH: No easy bruising, or bleeding gums  ALLERGY AND IMMUNOLOGIC: No hives or eczema	    [ ] All others negative	  [x ] Unable to obtain    PHYSICAL EXAM:  T(C): 36.4 (08-05-23 @ 04:09), Max: 36.4 (08-04-23 @ 20:45)  HR: 115 (08-05-23 @ 04:09) (59 - 115)  BP: 97/61 (08-05-23 @ 04:09) (97/61 - 116/70)  RR: 18 (08-05-23 @ 04:09) (18 - 18)  SpO2: 91% (08-05-23 @ 04:09) (91% - 97%)  Wt(kg): --  I&O's Summary    04 Aug 2023 07:01  -  05 Aug 2023 07:00  --------------------------------------------------------  IN: 300 mL / OUT: 0 mL / NET: 300 mL        Appearance: Normal	  HEENT:   Normal oral mucosa, PERRL, EOMI	  Lymphatic: No lymphadenopathy  Cardiovascular: Normal S1 S2, No JVD, + murmurs, No edema  Respiratory: rhonchi	  Psychiatry: dementia  Gastrointestinal:  Soft, Non-tender, + BS	  Skin: No rashes, No ecchymoses, No cyanosis	  Neurologic: Non-focal  Extremities: Normal range of motion, No clubbing, cyanosis or edema  Vascular: Peripheral pulses palpable 2+ bilaterally    MEDICATIONS  (STANDING):  aspirin enteric coated 81 milliGRAM(s) Oral daily  atorvastatin 80 milliGRAM(s) Oral at bedtime  chlorhexidine 2% Cloths 1 Application(s) Topical <User Schedule>  enoxaparin Injectable 30 milliGRAM(s) SubCutaneous every 24 hours  folic acid 1 milliGRAM(s) Oral daily  furosemide   Injectable 20 milliGRAM(s) IV Push daily  levETIRAcetam 500 milliGRAM(s) Oral two times a day  metoprolol tartrate 25 milliGRAM(s) Oral two times a day  mirtazapine 15 milliGRAM(s) Oral daily  pantoprazole    Tablet 40 milliGRAM(s) Oral before breakfast      TELEMETRY: 	    ECG:  	  RADIOLOGY:  OTHER: 	  	  LABS:	 	    CARDIAC MARKERS:                        10.3   8.04  )-----------( 313      ( 05 Aug 2023 07:13 )             33.0     08-05    143  |  107  |  42<H>  ----------------------------<  62<L>  3.6   |  18<L>  |  1.94<H>    Ca    9.0      05 Aug 2023 07:07  Phos  4.0     08-04  Mg     2.0     08-04      proBNP:   Lipid Profile: Cholesterol 88  LDL --  HDL 31  TG 81    HgA1c:   TSH: Thyroid Stimulating Hormone, Serum: 2.30 uIU/mL (08-04 @ 06:14)          Assessment and plan  ---------------------------  89M w/ hx of CAD s/p CABG, s/p PPM, CHF, seizures, CKD, anemia, HTN, recent UTI p/w decreased PO and weakness. Pt has been very weak and almost bed bound for the past month. Pt has hx of UTIs requiring admission in the past. Around June 4th daughter became concerned over change in pt's mood and awareness. Called PMD on phone and was prescribed 1 week course of Levaquin. Pt did not seem to improve significantly and pt went to see urologist around 6/14. Reportedly urine was checked and pt was switched to Bactrim. Pt otherwise compliant with meds as they are provided by [facility] [place].    chf ?hfpef  check TTE  continue current meds  Lasix 20 mg iv bid  ct chest no contrast noted with bl pleural effusion  will adjust cardiac meds  tele  cardiac enzyme  anemia, check guaiac check cbc  Protonix  abx for UTI  awaiting blood work  echo noted with SEVERE AS as the cause of chf and hx of CAD, need to discuss with family and health care proxy  continue gentle diuresis  Avoid hypotension/ ACRE or ARB  will repeat chest x ray on MONDAY  not a surgical candidate

## 2023-08-05 NOTE — DIETITIAN INITIAL EVALUATION ADULT - REASON FOR ADMISSION
per chart 89M w/ hx of CAD s/p CABG, s/p PPM, CHF, seizures, CKD, anemia, HTN, recent UTI p/w decreased PO and weakness. Pt has been very weak and almost bed bound for the past month. Pt has hx of UTIs requiring admission in the past. Around June 4th daughter became concerned over change in pt's mood and awareness. Called PMD on phone and was prescribed 1 week course of Levaquin. Pt did not seem to improve significantly and pt went to see urologist around 6/14. Reportedly urine was checked and pt was switched to Bactrim.

## 2023-08-05 NOTE — DIETITIAN INITIAL EVALUATION ADULT - REASON INDICATOR FOR ASSESSMENT
consulted for MST score 2 or >, assessment. information obtained from pt, pt daughter at bedside, electronic medical record

## 2023-08-06 LAB
-  AMPICILLIN: SIGNIFICANT CHANGE UP
-  CIPROFLOXACIN: SIGNIFICANT CHANGE UP
-  LEVOFLOXACIN: SIGNIFICANT CHANGE UP
-  NITROFURANTOIN: SIGNIFICANT CHANGE UP
-  TETRACYCLINE: SIGNIFICANT CHANGE UP
-  VANCOMYCIN: SIGNIFICANT CHANGE UP
ANION GAP SERPL CALC-SCNC: 20 MMOL/L — HIGH (ref 5–17)
BUN SERPL-MCNC: 42 MG/DL — HIGH (ref 7–23)
CALCIUM SERPL-MCNC: 9.2 MG/DL — SIGNIFICANT CHANGE UP (ref 8.4–10.5)
CHLORIDE SERPL-SCNC: 108 MMOL/L — SIGNIFICANT CHANGE UP (ref 96–108)
CO2 SERPL-SCNC: 16 MMOL/L — LOW (ref 22–31)
CREAT SERPL-MCNC: 1.99 MG/DL — HIGH (ref 0.5–1.3)
CULTURE RESULTS: SIGNIFICANT CHANGE UP
EGFR: 31 ML/MIN/1.73M2 — LOW
GLUCOSE SERPL-MCNC: 87 MG/DL — SIGNIFICANT CHANGE UP (ref 70–99)
HCT VFR BLD CALC: 36.3 % — LOW (ref 39–50)
HGB BLD-MCNC: 11.3 G/DL — LOW (ref 13–17)
MAGNESIUM SERPL-MCNC: 2 MG/DL — SIGNIFICANT CHANGE UP (ref 1.6–2.6)
MCHC RBC-ENTMCNC: 29.1 PG — SIGNIFICANT CHANGE UP (ref 27–34)
MCHC RBC-ENTMCNC: 31.1 GM/DL — LOW (ref 32–36)
MCV RBC AUTO: 93.6 FL — SIGNIFICANT CHANGE UP (ref 80–100)
METHOD TYPE: SIGNIFICANT CHANGE UP
NRBC # BLD: 0 /100 WBCS — SIGNIFICANT CHANGE UP (ref 0–0)
ORGANISM # SPEC MICROSCOPIC CNT: SIGNIFICANT CHANGE UP
ORGANISM # SPEC MICROSCOPIC CNT: SIGNIFICANT CHANGE UP
PLATELET # BLD AUTO: 376 K/UL — SIGNIFICANT CHANGE UP (ref 150–400)
POTASSIUM SERPL-MCNC: 3.6 MMOL/L — SIGNIFICANT CHANGE UP (ref 3.5–5.3)
POTASSIUM SERPL-SCNC: 3.6 MMOL/L — SIGNIFICANT CHANGE UP (ref 3.5–5.3)
RBC # BLD: 3.88 M/UL — LOW (ref 4.2–5.8)
RBC # FLD: 15.2 % — HIGH (ref 10.3–14.5)
SODIUM SERPL-SCNC: 144 MMOL/L — SIGNIFICANT CHANGE UP (ref 135–145)
SPECIMEN SOURCE: SIGNIFICANT CHANGE UP
WBC # BLD: 11.68 K/UL — HIGH (ref 3.8–10.5)
WBC # FLD AUTO: 11.68 K/UL — HIGH (ref 3.8–10.5)

## 2023-08-06 RX ADMIN — ATORVASTATIN CALCIUM 80 MILLIGRAM(S): 80 TABLET, FILM COATED ORAL at 22:32

## 2023-08-06 RX ADMIN — MUPIROCIN 1 APPLICATION(S): 20 OINTMENT TOPICAL at 18:19

## 2023-08-06 RX ADMIN — CHLORHEXIDINE GLUCONATE 1 APPLICATION(S): 213 SOLUTION TOPICAL at 05:20

## 2023-08-06 RX ADMIN — ENOXAPARIN SODIUM 30 MILLIGRAM(S): 100 INJECTION SUBCUTANEOUS at 18:19

## 2023-08-06 RX ADMIN — LEVETIRACETAM 500 MILLIGRAM(S): 250 TABLET, FILM COATED ORAL at 18:19

## 2023-08-06 RX ADMIN — MIRTAZAPINE 15 MILLIGRAM(S): 45 TABLET, ORALLY DISINTEGRATING ORAL at 13:16

## 2023-08-06 RX ADMIN — LEVETIRACETAM 500 MILLIGRAM(S): 250 TABLET, FILM COATED ORAL at 05:19

## 2023-08-06 RX ADMIN — Medication 1 MILLIGRAM(S): at 13:16

## 2023-08-06 RX ADMIN — MUPIROCIN 1 APPLICATION(S): 20 OINTMENT TOPICAL at 05:20

## 2023-08-06 RX ADMIN — PANTOPRAZOLE SODIUM 40 MILLIGRAM(S): 20 TABLET, DELAYED RELEASE ORAL at 06:23

## 2023-08-06 RX ADMIN — Medication 25 MILLIGRAM(S): at 05:19

## 2023-08-06 RX ADMIN — Medication 25 MILLIGRAM(S): at 18:19

## 2023-08-06 RX ADMIN — Medication 20 MILLIGRAM(S): at 05:19

## 2023-08-06 RX ADMIN — Medication 81 MILLIGRAM(S): at 13:16

## 2023-08-06 NOTE — PROGRESS NOTE ADULT - SUBJECTIVE AND OBJECTIVE BOX
Date of Service: 08-06-23 @ 10:42           CARDIOLOGY     PROGRESS  NOTE   ________________________________________________    CHIEF COMPLAINT:Patient is a 89y old  Male who presents with a chief complaint of FTT (05 Aug 2023 18:46)  no complain  	  REVIEW OF SYSTEMS:  CONSTITUTIONAL: No fever, weight loss, or fatigue  EYES: No eye pain, visual disturbances, or discharge  ENT:  No difficulty hearing, tinnitus, vertigo; No sinus or throat pain  NECK: No pain or stiffness  RESPIRATORY: No cough, wheezing, chills or hemoptysis; No Shortness of Breath  CARDIOVASCULAR: No chest pain, palpitations, passing out, dizziness, or leg swelling  GASTROINTESTINAL: No abdominal or epigastric pain. No nausea, vomiting, or hematemesis; No diarrhea or constipation. No melena or hematochezia.  GENITOURINARY: No dysuria, frequency, hematuria, or incontinence  NEUROLOGICAL: No headaches, memory loss, loss of strength, numbness, or tremors  SKIN: No itching, burning, rashes, or lesions   LYMPH Nodes: No enlarged glands  ENDOCRINE: No heat or cold intolerance; No hair loss  MUSCULOSKELETAL: No joint pain or swelling; No muscle, back, or extremity pain  PSYCHIATRIC: No depression, anxiety, mood swings, or difficulty sleeping  HEME/LYMPH: No easy bruising, or bleeding gums  ALLERGY AND IMMUNOLOGIC: No hives or eczema	    [ ] All others negative	  [ x] Unable to obtain    PHYSICAL EXAM:  T(C): 36.8 (08-06-23 @ 03:53), Max: 36.8 (08-06-23 @ 03:53)  HR: 61 (08-06-23 @ 03:53) (59 - 61)  BP: 127/63 (08-06-23 @ 03:53) (118/65 - 143/75)  RR: 18 (08-06-23 @ 03:53) (18 - 18)  SpO2: 97% (08-06-23 @ 03:53) (94% - 97%)  Wt(kg): --  I&O's Summary    05 Aug 2023 07:01  -  06 Aug 2023 07:00  --------------------------------------------------------  IN: 200 mL / OUT: 0 mL / NET: 200 mL        Appearance: Normal	  HEENT:   Normal oral mucosa, PERRL, EOMI	  Lymphatic: No lymphadenopathy  Cardiovascular: Normal S1 S2, No JVD, + murmurs, No edema  Respiratory: rhonchi  Psychiatry: dementia  Gastrointestinal:  Soft, Non-tender, + BS	  Skin: No rashes, No ecchymoses, No cyanosis	  Extremities: Normal range of motion, No clubbing, cyanosis or edema  Vascular: Peripheral pulses palpable 2+ bilaterally    MEDICATIONS  (STANDING):  aspirin enteric coated 81 milliGRAM(s) Oral daily  atorvastatin 80 milliGRAM(s) Oral at bedtime  chlorhexidine 2% Cloths 1 Application(s) Topical <User Schedule>  enoxaparin Injectable 30 milliGRAM(s) SubCutaneous every 24 hours  folic acid 1 milliGRAM(s) Oral daily  furosemide   Injectable 20 milliGRAM(s) IV Push daily  levETIRAcetam 500 milliGRAM(s) Oral two times a day  metoprolol tartrate 25 milliGRAM(s) Oral two times a day  mirtazapine 15 milliGRAM(s) Oral daily  mupirocin 2% Nasal 1 Application(s) Both Nostrils two times a day  pantoprazole    Tablet 40 milliGRAM(s) Oral before breakfast      TELEMETRY: 	    ECG:  	  RADIOLOGY:  OTHER: 	  	  LABS:	 	    CARDIAC MARKERS:                                11.3   11.68 )-----------( 376      ( 06 Aug 2023 06:33 )             36.3     08-06    144  |  108  |  42<H>  ----------------------------<  87  3.6   |  16<L>  |  1.99<H>    Ca    9.2      06 Aug 2023 06:32  Mg     2.0     08-06      proBNP:   Lipid Profile: Cholesterol 88  LDL --  HDL 31  TG 81    HgA1c:   TSH: Thyroid Stimulating Hormone, Serum: 2.30 uIU/mL (08-04 @ 06:14)      < from: CT Chest No Cont (08.02.23 @ 21:23) >  Bilateral pleural effusions with associated atelectasis as described   above. No definite evidence for pneumonia/infection.      Assessment and plan  ---------------------------  89M w/ hx of CAD s/p CABG, s/p PPM, CHF, seizures, CKD, anemia, HTN, recent UTI p/w decreased PO and weakness. Pt has been very weak and almost bed bound for the past month. Pt has hx of UTIs requiring admission in the past. Around June 4th daughter became concerned over change in pt's mood and awareness. Called PMD on phone and was prescribed 1 week course of Levaquin. Pt did not seem to improve significantly and pt went to see urologist around 6/14. Reportedly urine was checked and pt was switched to Bactrim. Pt otherwise compliant with meds as they are provided by [facility].    chf ?hfpef  check TTE  continue current meds  Lasix 20 mg iv bid  ct chest no contrast noted with bl pleural effusion  will adjust cardiac meds  tele  cardiac enzyme  anemia, check guaiac check cbc  Protonix  abx for UTI  awaiting blood work  echo noted with SEVERE AS as the cause of chf and hx of CAD, need to discuss with family and health care proxy  continue gentle diuresis  Avoid hypotension/ ACRE or ARB  will repeat chest x ray on MONDAY  not a surgical candidate

## 2023-08-06 NOTE — PROGRESS NOTE ADULT - SUBJECTIVE AND OBJECTIVE BOX
Patient is a 89y Male whom presented to the hospital with ckd stage3     PAST MEDICAL & SURGICAL HISTORY:  CHF, chronic      CAD (coronary artery disease)      Essential hypertension      FH: CABG (coronary artery bypass surgery)          MEDICATIONS  (STANDING):  aspirin enteric coated 81 milliGRAM(s) Oral daily  atorvastatin 80 milliGRAM(s) Oral at bedtime  enoxaparin Injectable 40 milliGRAM(s) SubCutaneous every 24 hours  folic acid 1 milliGRAM(s) Oral daily  furosemide   Injectable 20 milliGRAM(s) IV Push two times a day  levETIRAcetam 500 milliGRAM(s) Oral two times a day  metoprolol tartrate 25 milliGRAM(s) Oral two times a day  mirtazapine 15 milliGRAM(s) Oral daily  pantoprazole    Tablet 40 milliGRAM(s) Oral before breakfast      Allergies    No Known Allergies    Intolerances        SOCIAL HISTORY:  Denies ETOh,Smoking,     FAMILY HISTORY:      REVIEW OF SYSTEMS:    CONSTITUTIONAL: No weakness, fevers or chills  RESPIRATORY: No cough, wheezing, hemoptysis; No shortness of breath  CARDIOVASCULAR: No chest pain or palpitations  GASTROINTESTINAL: No abdominal or epigastric pain. No nausea, vomiting,     No diarrhea or constipation. No melena   GENITOURINARY: No dysuria, frequency or hematuria  NEUROLOGICAL: pos  weakness  SKIN: dry                                                                     11.3   11.68 )-----------( 376      ( 06 Aug 2023 06:33 )             36.3       CBC Full  -  ( 06 Aug 2023 06:33 )  WBC Count : 11.68 K/uL  RBC Count : 3.88 M/uL  Hemoglobin : 11.3 g/dL  Hematocrit : 36.3 %  Platelet Count - Automated : 376 K/uL  Mean Cell Volume : 93.6 fl  Mean Cell Hemoglobin : 29.1 pg  Mean Cell Hemoglobin Concentration : 31.1 gm/dL  Auto Neutrophil # : x  Auto Lymphocyte # : x  Auto Monocyte # : x  Auto Eosinophil # : x  Auto Basophil # : x  Auto Neutrophil % : x  Auto Lymphocyte % : x  Auto Monocyte % : x  Auto Eosinophil % : x  Auto Basophil % : x      08-06    144  |  108  |  42<H>  ----------------------------<  87  3.6   |  16<L>  |  1.99<H>    Ca    9.2      06 Aug 2023 06:32  Mg     2.0     08-06        CAPILLARY BLOOD GLUCOSE          Vital Signs Last 24 Hrs  T(C): 36.8 (06 Aug 2023 03:53), Max: 36.8 (06 Aug 2023 03:53)  T(F): 98.2 (06 Aug 2023 03:53), Max: 98.2 (06 Aug 2023 03:53)  HR: 61 (06 Aug 2023 03:53) (60 - 61)  BP: 127/63 (06 Aug 2023 03:53) (118/65 - 127/63)  BP(mean): --  RR: 18 (06 Aug 2023 03:53) (18 - 18)  SpO2: 97% (06 Aug 2023 03:53) (97% - 97%)    Parameters below as of 06 Aug 2023 03:53  Patient On (Oxygen Delivery Method): room air        Urinalysis Basic - ( 06 Aug 2023 06:32 )    Color: x / Appearance: x / SG: x / pH: x  Gluc: 87 mg/dL / Ketone: x  / Bili: x / Urobili: x   Blood: x / Protein: x / Nitrite: x   Leuk Esterase: x / RBC: x / WBC x   Sq Epi: x / Non Sq Epi: x / Bacteria: x                  PHYSICAL EXAM:    Constitutional: NAD  HEENT: conjunctive   clear   Neck:  No JVD  Respiratory: decrease bs b/l   Cardiovascular: S1 and S2  Gastrointestinal: BS+, soft, NT/ND  Extremities: No peripheral edema  Neurological: A/O x 1, no focal deficits  Psychiatric: Normal mood, normal affect  : No Martinez  Skin: dry   Access: Not applicable

## 2023-08-06 NOTE — PROGRESS NOTE ADULT - ASSESSMENT
89M w/ hx of CAD s/p CABG, s/p PPM, CHF, seizures, CKD, anemia, HTN, recent UTI p/w decreased PO and weakness. Pt has been very weak and almost bed bound for the past month. Pt has hx of UTIs requiring admission in the past. Around June 4th daughter became concerned over change in pt's mood and awareness. Called PMD on phone and was prescribed 1 week course of Levaquin. Pt did not seem to improve significantly and pt went to see urologist around 6/14. Reportedly urine was checked and pt was switched to Bactrim. Pt otherwise compliant with meds as they are provided by [facility].  (02 Aug 2023 20:38)      CHRONIC KIDNEY DISEASE, STAGE 3: decrease furosemide   Injectable 20 milliGRAM(s) IV Push a day    Serum creatinine is increasing , approximating a GFR is decreased   ml/min.   There is no progression.  No uremic symptoms. No evidence of  worsening  Anemia. Fluid status stable.   Will continue to avoid nephrotoxic drugs.  Patient remains asymptomatic.  Continue current therapy.      ANEMIA PLAN:  Anemia of chronic disease:  We will consider epo  aiming for a HCT of 32-36 %.   We will monitor Iron stores, B12 and RBC folate .    fluid overload furosemide   Injectable 20 milliGRAM(s) IV Push  a day    BP monitoring,continue current antihypertensive meds, low salt diet,followup with PMD in 1-2 weeks  metoprolol tartrate 25 milliGRAM(s) Oral two times a day

## 2023-08-07 DIAGNOSIS — N39.0 URINARY TRACT INFECTION, SITE NOT SPECIFIED: ICD-10-CM

## 2023-08-07 DIAGNOSIS — I35.0 NONRHEUMATIC AORTIC (VALVE) STENOSIS: ICD-10-CM

## 2023-08-07 DIAGNOSIS — Z71.89 OTHER SPECIFIED COUNSELING: ICD-10-CM

## 2023-08-07 LAB
ANION GAP SERPL CALC-SCNC: 18 MMOL/L — HIGH (ref 5–17)
APPEARANCE UR: CLEAR — SIGNIFICANT CHANGE UP
BACTERIA # UR AUTO: NEGATIVE — SIGNIFICANT CHANGE UP
BASOPHILS # BLD AUTO: 0.06 K/UL — SIGNIFICANT CHANGE UP (ref 0–0.2)
BASOPHILS NFR BLD AUTO: 0.6 % — SIGNIFICANT CHANGE UP (ref 0–2)
BILIRUB UR-MCNC: NEGATIVE — SIGNIFICANT CHANGE UP
BUN SERPL-MCNC: 42 MG/DL — HIGH (ref 7–23)
CALCIUM SERPL-MCNC: 9.2 MG/DL — SIGNIFICANT CHANGE UP (ref 8.4–10.5)
CHLORIDE SERPL-SCNC: 106 MMOL/L — SIGNIFICANT CHANGE UP (ref 96–108)
CO2 SERPL-SCNC: 21 MMOL/L — LOW (ref 22–31)
COLOR SPEC: SIGNIFICANT CHANGE UP
COMMENT - URINE: SIGNIFICANT CHANGE UP
CREAT SERPL-MCNC: 1.93 MG/DL — HIGH (ref 0.5–1.3)
DIFF PNL FLD: NEGATIVE — SIGNIFICANT CHANGE UP
EGFR: 32 ML/MIN/1.73M2 — LOW
EOSINOPHIL # BLD AUTO: 0.37 K/UL — SIGNIFICANT CHANGE UP (ref 0–0.5)
EOSINOPHIL NFR BLD AUTO: 3.6 % — SIGNIFICANT CHANGE UP (ref 0–6)
EPI CELLS # UR: 1 /HPF — SIGNIFICANT CHANGE UP
GLUCOSE SERPL-MCNC: 85 MG/DL — SIGNIFICANT CHANGE UP (ref 70–99)
GLUCOSE UR QL: NEGATIVE — SIGNIFICANT CHANGE UP
HCT VFR BLD CALC: 34 % — LOW (ref 39–50)
HGB BLD-MCNC: 10.7 G/DL — LOW (ref 13–17)
HYALINE CASTS # UR AUTO: 1 /LPF — SIGNIFICANT CHANGE UP (ref 0–2)
IMM GRANULOCYTES NFR BLD AUTO: 0.6 % — SIGNIFICANT CHANGE UP (ref 0–0.9)
KETONES UR-MCNC: NEGATIVE — SIGNIFICANT CHANGE UP
LEUKOCYTE ESTERASE UR-ACNC: ABNORMAL
LYMPHOCYTES # BLD AUTO: 1.51 K/UL — SIGNIFICANT CHANGE UP (ref 1–3.3)
LYMPHOCYTES # BLD AUTO: 14.6 % — SIGNIFICANT CHANGE UP (ref 13–44)
MCHC RBC-ENTMCNC: 28.8 PG — SIGNIFICANT CHANGE UP (ref 27–34)
MCHC RBC-ENTMCNC: 31.5 GM/DL — LOW (ref 32–36)
MCV RBC AUTO: 91.6 FL — SIGNIFICANT CHANGE UP (ref 80–100)
MONOCYTES # BLD AUTO: 0.9 K/UL — SIGNIFICANT CHANGE UP (ref 0–0.9)
MONOCYTES NFR BLD AUTO: 8.7 % — SIGNIFICANT CHANGE UP (ref 2–14)
NEUTROPHILS # BLD AUTO: 7.42 K/UL — HIGH (ref 1.8–7.4)
NEUTROPHILS NFR BLD AUTO: 71.9 % — SIGNIFICANT CHANGE UP (ref 43–77)
NITRITE UR-MCNC: NEGATIVE — SIGNIFICANT CHANGE UP
NRBC # BLD: 0 /100 WBCS — SIGNIFICANT CHANGE UP (ref 0–0)
PH UR: 5.5 — SIGNIFICANT CHANGE UP (ref 5–8)
PLATELET # BLD AUTO: 336 K/UL — SIGNIFICANT CHANGE UP (ref 150–400)
POTASSIUM SERPL-MCNC: 3.3 MMOL/L — LOW (ref 3.5–5.3)
POTASSIUM SERPL-SCNC: 3.3 MMOL/L — LOW (ref 3.5–5.3)
PROT UR-MCNC: NEGATIVE — SIGNIFICANT CHANGE UP
RBC # BLD: 3.71 M/UL — LOW (ref 4.2–5.8)
RBC # FLD: 15.5 % — HIGH (ref 10.3–14.5)
RBC CASTS # UR COMP ASSIST: 1 /HPF — SIGNIFICANT CHANGE UP (ref 0–4)
SODIUM SERPL-SCNC: 145 MMOL/L — SIGNIFICANT CHANGE UP (ref 135–145)
SP GR SPEC: 1.01 — SIGNIFICANT CHANGE UP (ref 1.01–1.02)
UROBILINOGEN FLD QL: NEGATIVE — SIGNIFICANT CHANGE UP
WBC # BLD: 10.32 K/UL — SIGNIFICANT CHANGE UP (ref 3.8–10.5)
WBC # FLD AUTO: 10.32 K/UL — SIGNIFICANT CHANGE UP (ref 3.8–10.5)
WBC UR QL: 26 /HPF — HIGH (ref 0–5)

## 2023-08-07 PROCEDURE — 99498 ADVNCD CARE PLAN ADDL 30 MIN: CPT | Mod: GC,25

## 2023-08-07 PROCEDURE — 99497 ADVNCD CARE PLAN 30 MIN: CPT | Mod: GC,25

## 2023-08-07 PROCEDURE — 99223 1ST HOSP IP/OBS HIGH 75: CPT | Mod: GC

## 2023-08-07 PROCEDURE — 71045 X-RAY EXAM CHEST 1 VIEW: CPT | Mod: 26

## 2023-08-07 PROCEDURE — 99233 SBSQ HOSP IP/OBS HIGH 50: CPT | Mod: GC

## 2023-08-07 RX ORDER — POTASSIUM CHLORIDE 20 MEQ
20 PACKET (EA) ORAL ONCE
Refills: 0 | Status: COMPLETED | OUTPATIENT
Start: 2023-08-07 | End: 2023-08-07

## 2023-08-07 RX ORDER — POTASSIUM CHLORIDE 20 MEQ
10 PACKET (EA) ORAL
Refills: 0 | Status: COMPLETED | OUTPATIENT
Start: 2023-08-07 | End: 2023-08-07

## 2023-08-07 RX ADMIN — ATORVASTATIN CALCIUM 80 MILLIGRAM(S): 80 TABLET, FILM COATED ORAL at 21:17

## 2023-08-07 RX ADMIN — Medication 81 MILLIGRAM(S): at 11:31

## 2023-08-07 RX ADMIN — MUPIROCIN 1 APPLICATION(S): 20 OINTMENT TOPICAL at 17:51

## 2023-08-07 RX ADMIN — LEVETIRACETAM 500 MILLIGRAM(S): 250 TABLET, FILM COATED ORAL at 17:50

## 2023-08-07 RX ADMIN — Medication 20 MILLIEQUIVALENT(S): at 23:01

## 2023-08-07 RX ADMIN — Medication 1 MILLIGRAM(S): at 11:31

## 2023-08-07 RX ADMIN — PANTOPRAZOLE SODIUM 40 MILLIGRAM(S): 20 TABLET, DELAYED RELEASE ORAL at 06:26

## 2023-08-07 RX ADMIN — Medication 25 MILLIGRAM(S): at 05:50

## 2023-08-07 RX ADMIN — ENOXAPARIN SODIUM 30 MILLIGRAM(S): 100 INJECTION SUBCUTANEOUS at 17:51

## 2023-08-07 RX ADMIN — LEVETIRACETAM 500 MILLIGRAM(S): 250 TABLET, FILM COATED ORAL at 05:50

## 2023-08-07 RX ADMIN — Medication 20 MILLIGRAM(S): at 05:50

## 2023-08-07 RX ADMIN — MUPIROCIN 1 APPLICATION(S): 20 OINTMENT TOPICAL at 05:51

## 2023-08-07 RX ADMIN — Medication 25 MILLIGRAM(S): at 17:50

## 2023-08-07 RX ADMIN — Medication 100 MILLIEQUIVALENT(S): at 21:18

## 2023-08-07 RX ADMIN — CHLORHEXIDINE GLUCONATE 1 APPLICATION(S): 213 SOLUTION TOPICAL at 06:25

## 2023-08-07 RX ADMIN — MIRTAZAPINE 15 MILLIGRAM(S): 45 TABLET, ORALLY DISINTEGRATING ORAL at 11:32

## 2023-08-07 NOTE — PROGRESS NOTE ADULT - SUBJECTIVE AND OBJECTIVE BOX
Patient is a 89y Male whom presented to the hospital with ckd stage3     PAST MEDICAL & SURGICAL HISTORY:  CHF, chronic      CAD (coronary artery disease)      Essential hypertension      FH: CABG (coronary artery bypass surgery)          MEDICATIONS  (STANDING):  aspirin enteric coated 81 milliGRAM(s) Oral daily  atorvastatin 80 milliGRAM(s) Oral at bedtime  enoxaparin Injectable 40 milliGRAM(s) SubCutaneous every 24 hours  folic acid 1 milliGRAM(s) Oral daily  furosemide   Injectable 20 milliGRAM(s) IV Push two times a day  levETIRAcetam 500 milliGRAM(s) Oral two times a day  metoprolol tartrate 25 milliGRAM(s) Oral two times a day  mirtazapine 15 milliGRAM(s) Oral daily  pantoprazole    Tablet 40 milliGRAM(s) Oral before breakfast      Allergies    No Known Allergies    Intolerances        SOCIAL HISTORY:  Denies ETOh,Smoking,     FAMILY HISTORY:      REVIEW OF SYSTEMS:    CONSTITUTIONAL: No weakness, fevers or chills  RESPIRATORY: No cough, wheezing, hemoptysis; No shortness of breath  CARDIOVASCULAR: No chest pain or palpitations  GASTROINTESTINAL: No abdominal or epigastric pain. No nausea, vomiting,     No diarrhea or constipation. No melena   GENITOURINARY: No dysuria, frequency or hematuria  NEUROLOGICAL: pos  weakness  SKIN: dry                                              10.7   10.32 )-----------( 336      ( 07 Aug 2023 09:59 )             34.0       CBC Full  -  ( 07 Aug 2023 09:59 )  WBC Count : 10.32 K/uL  RBC Count : 3.71 M/uL  Hemoglobin : 10.7 g/dL  Hematocrit : 34.0 %  Platelet Count - Automated : 336 K/uL  Mean Cell Volume : 91.6 fl  Mean Cell Hemoglobin : 28.8 pg  Mean Cell Hemoglobin Concentration : 31.5 gm/dL  Auto Neutrophil # : 7.42 K/uL  Auto Lymphocyte # : 1.51 K/uL  Auto Monocyte # : 0.90 K/uL  Auto Eosinophil # : 0.37 K/uL  Auto Basophil # : 0.06 K/uL  Auto Neutrophil % : 71.9 %  Auto Lymphocyte % : 14.6 %  Auto Monocyte % : 8.7 %  Auto Eosinophil % : 3.6 %  Auto Basophil % : 0.6 %      08-07    145  |  106  |  42<H>  ----------------------------<  85  3.3<L>   |  21<L>  |  1.93<H>    Ca    9.2      07 Aug 2023 09:59  Mg     2.0     08-06        CAPILLARY BLOOD GLUCOSE          Vital Signs Last 24 Hrs  T(C): 36.3 (07 Aug 2023 11:10), Max: 36.6 (07 Aug 2023 04:47)  T(F): 97.3 (07 Aug 2023 11:10), Max: 97.8 (07 Aug 2023 04:47)  HR: 57 (07 Aug 2023 11:10) (57 - 77)  BP: 123/75 (07 Aug 2023 11:10) (108/42 - 123/75)  BP(mean): --  RR: 18 (07 Aug 2023 11:10) (18 - 18)  SpO2: 96% (07 Aug 2023 11:10) (96% - 97%)    Parameters below as of 07 Aug 2023 11:10  Patient On (Oxygen Delivery Method): room air        Urinalysis Basic - ( 07 Aug 2023 09:59 )    Color: x / Appearance: x / SG: x / pH: x  Gluc: 85 mg/dL / Ketone: x  / Bili: x / Urobili: x   Blood: x / Protein: x / Nitrite: x   Leuk Esterase: x / RBC: x / WBC x   Sq Epi: x / Non Sq Epi: x / Bacteria: x              PHYSICAL EXAM:    Constitutional: NAD  HEENT: conjunctive   clear   Neck:  No JVD  Respiratory: decrease bs b/l   Cardiovascular: S1 and S2  Gastrointestinal: BS+, soft, NT/ND  Extremities: No peripheral edema  Neurological: A/O x 1, no focal deficits  Psychiatric: Normal mood, normal affect  : No Martinez  Skin: dry   Access: Not applicable

## 2023-08-07 NOTE — CONSULT NOTE ADULT - ATTENDING COMMENTS
89M w/ hx of CAD s/p CABG, s/p PPM, CHF, seizures, CKD, anemia, HTN, recent UTI p/w decreased PO and weakness.  family is considering GOC. Currently no IV access.  Pt with progressive lethargy, confusion over last couple of months     # Recurrent UTI/ uncomplicated  - multiple recent treatment course; continues to decline clinically  - UCx now with E. fecium; sensitive to Vancomycin  - Linezolid might be an oral option although not ideal one, would need to check sensitivities   - Would need IV vancomycin; if were to treat his urine cultures  - Doubt that treating possible UTI would make significant benefit in his overall clinical course; he remains without symptoms  chcek PVR ( post void residual)  - Would recheck UA and Ucx  Chelsea Young M.D. ,   please reach via teams   If no answer, or after 5PM/ weekends,  then please call  [phone]    Assessment and plan discussed with the primary team . 89M w/ hx of CAD s/p CABG, s/p PPM, CHF, seizures, CKD, anemia, HTN, SEVERE AS, recent UTI p/w decreased PO and weakness.  family is considering GOC. Currently no IV access.  Pt with progressive lethargy, confusion over last couple of months     # Recurrent UTI/ uncomplicated  - multiple recent treatment course; continues to decline clinically  - UCx now with E. faecium; sensitive to Vancomycin  - Linezolid might be an oral option although not ideal one, would need to check sensitivities   - Would need IV vancomycin; if were to treat his urine cultures  - Doubt that treating possible UTI would make significant benefit in his overall clinical course; he remains without symptoms  check PVR ( post void residual)  - Would recheck UA and Ucx  Chelsea Young M.D. ,   please reach via teams   If no answer, or after 5PM/ weekends,  then please call  [phone]    Assessment and plan discussed with the primary team .

## 2023-08-07 NOTE — PROGRESS NOTE ADULT - PROBLEM SELECTOR PLAN 5
Family meeting with patient at bedside was had today. Discussed patient and family wishes. The priority is to focus on patients quality of care. MOLST form filled out by daughter Sekou today, DNR/DNI, two copies provided to family. She tried calling all siblings, ani and wilda did not answer. Spoke with son Alexsander Do, answered all questions. Patient offered hospice referral, family discussion is ongoing.

## 2023-08-07 NOTE — PROGRESS NOTE ADULT - PROBLEM SELECTOR PLAN 2
- Appreciated on recent echo.   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   - Given above findings he is at increase risk for cardiovascular complications with any procedure which includes Gastrotomy tube for feeding. - Appreciated on recent echo.   - Cardiology following, has a hx of ischemic CM w/ severe MR: s/p PCI to RCA w/ residual CFx, with heart failure.   - Given above findings he is at increase risk for cardiovascular complications with any procedure which includes Gastrotomy tube for feeding.

## 2023-08-07 NOTE — PROGRESS NOTE ADULT - CONVERSATION DETAILS
Patient has multiple readmission for the past months for UTI, he was discharged to rehab. Pt and family have noticed generalized weakness and physical decline. A few months ago we has able to walking with supervision, read newspaper and be more independent. He has become more dependent and Patient has multiple readmission for the past months for UTI, he was discharged to rehab. Pt and family have noticed generalized weakness and physical decline. A few months ago we has able to walking with supervision, read newspaper and be more independent. He has become more dependent, ambulating a few steps at rehab. He has had filled out HCP form in the past assigning daughter Sekou Kurtz and Jesus Do. Patient endorses that he would prefer quality of life, and being able to do activities that bring him ernestina. Family meeting done today, we discussed his condition, prognosis, and option of hospice given his condition with Sekou and son Alexsander Do.  Family members agreed that CPR and intubation should not be attempted given his deteriorating condition. Discussed benefits of hospice referral. All questions answered. Family is still undecided on hospice as they would want Physical therapy/rehabilitation. Lengthy encounters x2 today to discuss GOC. In our initial encounter, family shared that patient has multiple readmission for the past months for UTI, he was discharged to rehab. Pt and family have noticed generalized weakness and physical decline. A few months ago we has able to walking with supervision, read newspaper and be more independent. He has become more dependent, ambulating a few steps at rehab. He has had filled out HCP form in the past assigning daughter Sekou Kurtz and Jesus Do.     Patient, when awake, endorses that he would prefer quality of life, and being able to do activities that bring him ernestina. He questioned what he was getting out of being in the hospital. On a follow up conversation with pt's dtr and son, we discussed his condition, prognosis, and option of hospice given his condition with Sekou and son Alexsander Do.  Family members agreed that CPR and intubation should not be attempted given his deteriorating condition. Discussed benefits of home hospice services. All questions answered. Family is undecided on [facility] vs home hospice as the next step.

## 2023-08-07 NOTE — PROGRESS NOTE ADULT - ASSESSMENT
89M w/ hx of CAD s/p CABG, s/p PPM, CHF, seizures, CKD, anemia, HTN, recent UTI p/w decreased PO and weakness. Pt has been very weak and almost bed bound for the past month. Pt has hx of UTIs requiring admission in the past. Around June 4th daughter became concerned over change in pt's mood and awareness. Called PMD on phone and was prescribed 1 week course of Levaquin. Pt did not seem to improve significantly and pt went to see urologist around 6/14. Reportedly urine was checked and pt was switched to Bactrim. Pt otherwise compliant with meds as they are provided by [facility].  (02 Aug 2023 20:38)      CHRONIC KIDNEY DISEASE, STAGE 3: decrease furosemide   Injectable 20 milliGRAM(s) IV Push a day    Serum creatinine is increasing , approximating a GFR is decreased   ml/min.   There is no progression.  No uremic symptoms. No evidence of  worsening  Anemia. Fluid status stable.   Will continue to avoid nephrotoxic drugs.  Patient remains asymptomatic.  Continue current therapy.      ANEMIA PLAN:  Anemia of chronic disease:  We will consider epo  aiming for a HCT of 32-36 %.   We will monitor Iron stores, B12 and RBC folate .    fluid overload furosemide   Injectable 20 milliGRAM(s) IV Push  a day    BP monitoring,continue current antihypertensive meds, low salt diet,followup with PMD in 1-2 weeks  metoprolol tartrate 25 milliGRAM(s) Oral two times a day

## 2023-08-07 NOTE — PROGRESS NOTE ADULT - PROBLEM SELECTOR PLAN 3
- Patient with progressive deconditioning after multiple hospitalization.  - Seen by speech and swallow on august 3rd, recommending regular with thin liquids.   - Plan to possible by discharge to rehab. - Patient with progressive deconditioning after multiple hospitalizations.  - Seen by speech and swallow on august 3rd, recommending regular with thin liquids.   - Family had questions about a feeding tube, to which we answered to our best ability, and recommended against at this time  - Family in consideration of [facility] vs home with hospice as next step

## 2023-08-07 NOTE — PROGRESS NOTE ADULT - SUBJECTIVE AND OBJECTIVE BOX
Date of Service: 08-07-23 @ 11:26    SUBJECTIVE AND OBJECTIVE:  Indication for Geriatrics and Palliative Care Services/INTERVAL HPI:    89M w/ hx of CAD s/p CABG, s/p PPM, CHF, seizures, CKD, anemia, HTN, recent UTI p/w decreased PO and weakness. Pt has been very weak and almost bed bound for the past month. Pt has hx of UTIs requiring admission in the past. Around June 4th daughter became concerned over change in pt's mood and awareness. Called PMD on phone and was prescribed 1 week course of Levaquin. Pt did not seem to improve significantly and pt went to see urologist around 6/14. Reportedly urine was checked and pt was switched to Bactrim. Pt otherwise compliant with meds as they are provided by [facility].  (02 Aug 2023 20:38). Palliative care consulted for C       OVERNIGHT EVENTS: On telemetry v paced. No acute overnight events.     MOLST on chart: FULL CODE/CPR   Allergies:  No Known Allergies  Intolerances    MEDICATIONS  (STANDING):  aspirin enteric coated 81 milliGRAM(s) Oral daily  atorvastatin 80 milliGRAM(s) Oral at bedtime  chlorhexidine 2% Cloths 1 Application(s) Topical <User Schedule>  enoxaparin Injectable 30 milliGRAM(s) SubCutaneous every 24 hours  folic acid 1 milliGRAM(s) Oral daily  furosemide   Injectable 20 milliGRAM(s) IV Push daily  levETIRAcetam 500 milliGRAM(s) Oral two times a day  metoprolol tartrate 25 milliGRAM(s) Oral two times a day  mirtazapine 15 milliGRAM(s) Oral daily  mupirocin 2% Nasal 1 Application(s) Both Nostrils two times a day  pantoprazole    Tablet 40 milliGRAM(s) Oral before breakfast    MEDICATIONS  (PRN):    ITEMS UNCHECKED ARE NOT PRESENT    Patient seen at bedside this morning, arousable, mumbles, and goes back to sleep. Unable to assess at this time.     PRESENT SYMPTOMS: [ ]Unable to self-report - see [ ] CPOT [ ] PAINADS [ ] RDOS  Source if other than patient:  [ ]Family   [ ]Team     Pain:  [ ]yes [ ]no  QOL impact -   Location -                    Aggravating factors -  Quality -  Radiation -  Timing-  Severity (0-10 scale):  Minimal acceptable level (0-10 scale):     CPOT:    https://www.[facility].org/getattachment/[email or web address]/Critical-Care-Pain-Observation-Tool-(CPOT)    PAINAD Score: See PAINAD tool and score below     Dyspnea:  [ ]Mild [ ]Moderate [ ]Severe  RDOS: See RDOS tool and score below   0 to 2  minimal or no respiratory distress   3  mild distress  4 to 6 moderate distress  >7 severe distress    Anxiety:                             [ ]Mild [ ]Moderate [ ]Severe  Fatigue:                             [ ]Mild [ ]Moderate [ ]Severe  Nausea:                             [ ]Mild [ ]Moderate [ ]Severe  Loss of appetite:              [ ]Mild [ ]Moderate [ ]Severe  Constipation:                    [ ]Mild [ ]Moderate [ ]Severe    PCSSQ[Palliative Care Spiritual Screening Question]   Severity (0-10):  Score of 4 or > indicate consideration of Chaplaincy referral.  Chaplaincy Referral: [ ] yes [ ] refused [ ] following [ ] Deferred     Caregiver [place]? : [ ] yes [ ] no [ ] Deferred [ ] Declined             Social work referral [ ] Patient & Family Centered Care Referral [ ]     Anticipatory Grief present?:  [ ] yes [ ] no  [ ] Deferred                  Social work referral [ ] Chaplaincy Referral [ ]    		  Other Symptoms:  [ ]All other review of systems negative     Palliative Performance Status Version 2:   See PPSv2 tool and score below         PHYSICAL EXAM:  Vital Signs Last 24 Hrs  T(C): 36.6 (07 Aug 2023 04:47), Max: 36.6 (07 Aug 2023 04:47)  T(F): 97.8 (07 Aug 2023 04:47), Max: 97.8 (07 Aug 2023 04:47)  HR: 77 (07 Aug 2023 04:47) (60 - 77)  BP: 108/42 (07 Aug 2023 04:47) (108/42 - 110/69)  RR: 18 (07 Aug 2023 04:47) (18 - 18)  SpO2: 96% (07 Aug 2023 04:47) (96% - 97%)    Parameters below as of 07 Aug 2023 04:47  Patient On (Oxygen Delivery Method): room air     I&O's Summary    06 Aug 2023 07:01  -  07 Aug 2023 07:00  --------------------------------------------------------  IN: 540 mL / OUT: 950 mL / NET: -410 mL       GENERAL: [ ]Cachexia    [x]Alert  [x ]Oriented x0   [x ]Lethargic  [ ]Unarousable  [ ]Verbal  [ ]Non-Verbal  Behavioral:   [ ]Anxiety  [ ]Delirium [ ]Agitation [ ]Other  HEENT:  [ ]Normal   [ ]Dry mouth   [ ]ET Tube/Trach  [ ]Oral lesions  PULMONARY:   [x ]Clear [ ]Tachypnea  [ ]Audible excessive secretions   [ ]Rhonchi        [ ]Right [ ]Left [ ]Bilateral  [ ]Crackles        [ ]Right [ ]Left [ ]Bilateral  [ ]Wheezing     [ ]Right [ ]Left [ ]Bilateral  [ ]Diminished BS [ ] Right [ ]Left [ ]Bilateral  CARDIOVASCULAR:    [x ]Regular [ ]Irregular [ ]Tachy  [ ]Melo [ ]Murmur [ ]Other  GASTROINTESTINAL:  [x ]Soft  [ ]Distended   [x ]+BS  [ ]Non tender [ ]Tender  [ ]Other [ ]PEG [ ]OGT/ NGT   Last BM:   GENITOURINARY:  [ ]Normal [ ]Incontinent   [ ]Oliguria/Anuria   [ ]Martinez  MUSCULOSKELETAL:   [ ]Normal   [ ]Weakness  [ ]Bed/Wheelchair bound [ ]Edema Unable to assess.    NEUROLOGIC:   [ ]No focal deficits  [ ] Cognitive impairment  [ ] Dysphagia [ ]Dysarthria [ ] Paresis [ ]Other Unable to assess. Pupils reactive.   SKIN:   [ ]Normal  [ ]Rash  [ ]Other  [ ]Pressure ulcer(s) [ ]y [ ]n present on admission    CRITICAL CARE:  [ ]Shock Present  [ ]Septic [ ]Cardiogenic [ ]Neurologic [ ]Hypovolemic  [ ]Vasopressors [ ]Inotropes  [ ]Respiratory failure present [ ]Mechanical Ventilation [ ]Non-invasive ventilatory support [ ]High-Flow   [ ]Acute  [ ]Chronic [ ]Hypoxic  [ ]Hypercarbic [ ]Other  [ ]Other organ failure     LABS:                        10.7   10.32 )-----------( 336      ( 07 Aug 2023 09:59 )             34.0   08-07    145  |  106  |  42<H>  ----------------------------<  85  3.3<L>   |  21<L>  |  1.93<H>    Ca    9.2      07 Aug 2023 09:59  Mg     2.0     08-06    Urinalysis Basic - ( 07 Aug 2023 09:59 )  Color: x / Appearance: x / SG: x / pH: x  Gluc: 85 mg/dL / Ketone: x  / Bili: x / Urobili: x   Blood: x / Protein: x / Nitrite: x   Leuk Esterase: x / RBC: x / WBC x   Sq Epi: x / Non Sq Epi: x / Bacteria: x    RADIOLOGY & ADDITIONAL STUDIES:  Protein Calorie Malnutrition Present: [ ]mild [ ]moderate [ ]severe [ ]underweight [ ]morbid obesity  https://www.andeal.org/vault/2440/web/files/ONC/Table_Clinical%20Characteristics%20to%20Document%20Malnutrition-White%20JV%20et%20al%202012.pdf    Height (cm): 175.3 (08-02-23 @ 17:21), 175.3 (06-28-23 @ 15:50)  Weight (kg): 77.1 (08-02-23 @ 17:21), 77.1 (06-28-23 @ 15:50)  BMI (kg/m2): 25.1 (08-02-23 @ 17:21), 25.1 (06-28-23 @ 15:50)    [ ]PPSV2 < or = 30%  [ ]significant weight loss [ ]poor nutritional intake [ ]anasarcaPrealbumin, Serum: 10 mg/dL (08-04-23 @ 06:14)  [ ]Artificial Nutrition    Other REFERRALS:  [ ]Hospice  [ ]Child Life  [ ]Social Work  [ ]Case management [ ]Holistic Therapy     Goals of Care Document: Date of Service: 08-07-23 @ 11:26    SUBJECTIVE AND OBJECTIVE:  Indication for Geriatrics and Palliative Care Services/INTERVAL HPI:    89M w/ hx of CAD s/p CABG, s/p PPM, CHF, seizures, CKD, anemia, HTN, recent UT, severe AS, p/w decreased PO and weakness. Pt has been very weak and almost bed bound for the past month. Pt has hx of UTIs requiring admission in the past. Around June 4th daughter became concerned over change in pt's mood and awareness. Called PMD on phone and was prescribed 1 week course of Levaquin. Pt did not seem to improve significantly and pt went to see urologist around 6/14. Reportedly urine was checked and pt was switched to Bactrim. Pt otherwise compliant with meds as they are provided by [facility].  (02 Aug 2023 20:38). Palliative care consulted for [facility] give multiple readmission, failure to thrive. Patient seen at [facility] with daughter and aide, responding to patient appropriately. Daughter brought HCP form that assign her and Jesus. Dr. Finn primary team, recommended inpatient abx treatment with plan with home hospice given comorbidities including severe AS.       OVERNIGHT EVENTS: On telemetry v paced. No acute overnight events.     Allergies:  No Known Allergies  Intolerances    MEDICATIONS  (STANDING):  aspirin enteric coated 81 milliGRAM(s) Oral daily  atorvastatin 80 milliGRAM(s) Oral at bedtime  chlorhexidine 2% Cloths 1 Application(s) Topical <User Schedule>  enoxaparin Injectable 30 milliGRAM(s) SubCutaneous every 24 hours  folic acid 1 milliGRAM(s) Oral daily  furosemide   Injectable 20 milliGRAM(s) IV Push daily  levETIRAcetam 500 milliGRAM(s) Oral two times a day  metoprolol tartrate 25 milliGRAM(s) Oral two times a day  mirtazapine 15 milliGRAM(s) Oral daily  mupirocin 2% Nasal 1 Application(s) Both Nostrils two times a day  pantoprazole    Tablet 40 milliGRAM(s) Oral before breakfast    MEDICATIONS  (PRN):    ITEMS UNCHECKED ARE NOT PRESENT      PRESENT SYMPTOMS: [ ]Unable to self-report - see [ ] CPOT [ ] PAINADS [ ] RDOS  Source if other than patient:  [ ]Family   [ ]Team     Pain:  [ ]yes [x]no  QOL impact -   Location -                    Aggravating factors -  Quality -  Radiation -  Timing-  Severity (0-10 scale):  Minimal acceptable level (0-10 scale):     CPOT:    https://www.[facility].org/getattachment/[email or web address]/Critical-Care-Pain-Observation-Tool-(CPOT)    PAINAD Score: See PAINAD tool and score below     Dyspnea:  [X ]Mild [ ]Moderate [ ]Severe  RDOS: See RDOS tool and score below   0 to 2  minimal or no respiratory distress     Anxiety:                             [ ]Mild [ ]Moderate [ ]Severe  Fatigue:                             [ ]Mild [ ]Moderate [ ]Severe  Nausea:                             [ ]Mild [ ]Moderate [ ]Severe  Loss of appetite:              [ ]Mild [ ]Moderate [ ]Severe  Constipation:                    [ ]Mild [ ]Moderate [ ]Severe    PCSSQ[Palliative Care Spiritual Screening Question]   Severity (0-10):  Score of 4 or > indicate consideration of Chaplaincy referral.  Chaplaincy Referral: [ ] yes [ ] refused [ ] following [X ] Deferred     Caregiver [place]? : [ ] yes [X ] no [ ] Deferred [ ] Declined             Social work referral [ ] Patient & Family Centered Care Referral [ ]     Anticipatory Grief present?:  [ ] yes [ X] no  [ ] Deferred                  Social work referral [ ] Chaplaincy Referral [ ]    		  Other Symptoms:  [ ]All other review of systems negative     Palliative Performance Status Version 2:   See PPSv2 tool and score below         PHYSICAL EXAM:  Vital Signs Last 24 Hrs  T(C): 36.6 (07 Aug 2023 04:47), Max: 36.6 (07 Aug 2023 04:47)  T(F): 97.8 (07 Aug 2023 04:47), Max: 97.8 (07 Aug 2023 04:47)  HR: 77 (07 Aug 2023 04:47) (60 - 77)  BP: 108/42 (07 Aug 2023 04:47) (108/42 - 110/69)  RR: 18 (07 Aug 2023 04:47) (18 - 18)  SpO2: 96% (07 Aug 2023 04:47) (96% - 97%)    Parameters below as of 07 Aug 2023 04:47  Patient On (Oxygen Delivery Method): room air     I&O's Summary    06 Aug 2023 07:01  -  07 Aug 2023 07:00  --------------------------------------------------------  IN: 540 mL / OUT: 950 mL / NET: -410 mL       GENERAL: [ ]Cachexia    [x]Alert  [x ]Oriented x1-2  [ ]Lethargic  [ ]Unarousable  [ ]Verbal  [ ]Non-Verbal  Behavioral:   [ ]Anxiety  [ ]Delirium [ ]Agitation [ ]Other  HEENT:  [ ]Normal   [ ]Dry mouth   [ ]ET Tube/Trach  [ ]Oral lesions  PULMONARY:   [x ]Clear [ ]Tachypnea  [ ]Audible excessive secretions   [ ]Rhonchi        [ ]Right [ ]Left [ ]Bilateral  [x ]Crackles        [ ]Right [ ]Left [x ]Bilateral  [ ]Wheezing     [ ]Right [ ]Left [ ]Bilateral  [ ]Diminished BS [ ] Right [ ]Left [ ]Bilateral  CARDIOVASCULAR:    [x ]Regular [ ]Irregular [ ]Tachy  [ ]Melo [ ]Murmur [ ]Other  GASTROINTESTINAL:  [x ]Soft  [ ]Distended   [x ]+BS  [ ]Non tender [ ]Tender  [ ]Other [ ]PEG [ ]OGT/ NGT   Last BM:   GENITOURINARY:  [ ]Normal [ ]Incontinent   [ ]Oliguria/Anuria   [ ]Martinez  MUSCULOSKELETAL:   [ ]Normal   [ ]Weakness  [ ]Bed/Wheelchair bound [ ]Edema Unable to assess.    NEUROLOGIC:   [ ]No focal deficits  [ ] Cognitive impairment  [ ] Dysphagia [ ]Dysarthria [ ] Paresis [ ]Other Unable to assess. Pupils reactive.   SKIN:   [ ]Normal  [ ]Rash  [ ]Other  [ ]Pressure ulcer(s) [ ]y [ ]n present on admission    CRITICAL CARE:  [ ]Shock Present  [ ]Septic [ ]Cardiogenic [ ]Neurologic [ ]Hypovolemic  [ ]Vasopressors [ ]Inotropes  [ ]Respiratory failure present [ ]Mechanical Ventilation [ ]Non-invasive ventilatory support [ ]High-Flow   [ ]Acute  [ ]Chronic [ ]Hypoxic  [ ]Hypercarbic [ ]Other  [ ]Other organ failure     LABS:                        10.7   10.32 )-----------( 336      ( 07 Aug 2023 09:59 )             34.0   08-07    145  |  106  |  42<H>  ----------------------------<  85  3.3<L>   |  21<L>  |  1.93<H>    Ca    9.2      07 Aug 2023 09:59  Mg     2.0     08-06    Urinalysis Basic - ( 07 Aug 2023 09:59 )  Color: x / Appearance: x / SG: x / pH: x  Gluc: 85 mg/dL / Ketone: x  / Bili: x / Urobili: x   Blood: x / Protein: x / Nitrite: x   Leuk Esterase: x / RBC: x / WBC x   Sq Epi: x / Non Sq Epi: x / Bacteria: x    RADIOLOGY & ADDITIONAL STUDIES:  Protein Calorie Malnutrition Present: [ ]mild [ ]moderate [ ]severe [ ]underweight [ ]morbid obesity  https://www.andeal.org/vault/2440/web/files/ONC/Table_Clinical%20Characteristics%20to%20Document%20Malnutrition-White%20JV%20et%20al%202012.pdf    Height (cm): 175.3 (08-02-23 @ 17:21), 175.3 (06-28-23 @ 15:50)  Weight (kg): 77.1 (08-02-23 @ 17:21), 77.1 (06-28-23 @ 15:50)  BMI (kg/m2): 25.1 (08-02-23 @ 17:21), 25.1 (06-28-23 @ 15:50)    [ ]PPSV2 < or = 30%  [ ]significant weight loss [ ]poor nutritional intake [ ]anasarcaPrealbumin, Serum: 10 mg/dL (08-04-23 @ 06:14)  [ ]Artificial Nutrition    Other REFERRALS:  [ ]Hospice  [ ]Child Life  [ ]Social Work  [ ]Case management [ ]Holistic Therapy     Goals of Care Document: Date of Service: 08-07-23 @ 11:26    SUBJECTIVE AND OBJECTIVE:  Indication for Geriatrics and Palliative Care Services/INTERVAL HPI:    89M w/ hx of CAD s/p CABG, s/p PPM, CHF, seizures, CKD, anemia, HTN, recent UT, severe AS, p/w decreased PO and weakness. Pt has been very weak and almost bed bound for the past month. Pt has hx of UTIs requiring admission in the past. Around June 4th daughter became concerned over change in pt's mood and awareness. Called PMD on phone and was prescribed 1 week course of Levaquin. Pt did not seem to improve significantly and pt went to see urologist around 6/14. Reportedly urine was checked and pt was switched to Bactrim. Pt otherwise compliant with meds as they are provided by [facility].  (02 Aug 2023 20:38). Palliative care consulted for [facility] give multiple readmission, failure to thrive. Patient seen at [facility] with daughter and aide, responding to patient appropriately. Daughter brought HCP form that assign her and Jesus. Dr. Finn primary team, recommended inpatient abx treatment with plan with home hospice given comorbidities including severe AS.       OVERNIGHT EVENTS: On telemetry v paced. No acute overnight events.     Allergies:  No Known Allergies  Intolerances    MEDICATIONS  (STANDING):  aspirin enteric coated 81 milliGRAM(s) Oral daily  atorvastatin 80 milliGRAM(s) Oral at bedtime  chlorhexidine 2% Cloths 1 Application(s) Topical <User Schedule>  enoxaparin Injectable 30 milliGRAM(s) SubCutaneous every 24 hours  folic acid 1 milliGRAM(s) Oral daily  furosemide   Injectable 20 milliGRAM(s) IV Push daily  levETIRAcetam 500 milliGRAM(s) Oral two times a day  metoprolol tartrate 25 milliGRAM(s) Oral two times a day  mirtazapine 15 milliGRAM(s) Oral daily  mupirocin 2% Nasal 1 Application(s) Both Nostrils two times a day  pantoprazole    Tablet 40 milliGRAM(s) Oral before breakfast    MEDICATIONS  (PRN):    ITEMS UNCHECKED ARE NOT PRESENT      PRESENT SYMPTOMS: [ ]Unable to self-report - see [ ] CPOT [ ] PAINADS [ ] RDOS  Source if other than patient:  [x ]Family   [ ]Team     Pain:  [ ]yes [x]no  QOL impact -   Location -                    Aggravating factors -  Quality -  Radiation -  Timing-  Severity (0-10 scale):  Minimal acceptable level (0-10 scale):     CPOT:    https://www.[facility].org/getattachment/[email or web address]/Critical-Care-Pain-Observation-Tool-(CPOT)    PAINAD Score: See PAINAD tool and score below     Dyspnea:  [X ]Mild [ ]Moderate [ ]Severe  RDOS: See RDOS tool and score below   0 to 2  minimal or no respiratory distress     Anxiety:                             [ ]Mild [ ]Moderate [ ]Severe  Fatigue:                             [ ]Mild [x]Moderate [ ]Severe  Nausea:                             [ ]Mild [ ]Moderate [ ]Severe  Loss of appetite:              [ ]Mild [x]Moderate [ ]Severe  Constipation:                    [ ]Mild [ ]Moderate [ ]Severe    PCSSQ[Palliative Care Spiritual Screening Question]   Severity (0-10):  Score of 4 or > indicate consideration of Chaplaincy referral.    Chaplaincy Referral: [ ] yes [ ] refused [ ] following [X ] Deferred     Caregiver [place]? : [ ] yes [X ] no [ ] Deferred [ ] Declined             Social work referral [ ] Patient & Family Centered Care Referral [ ]     Anticipatory Grief present?:  [ ] yes [ X] no  [ ] Deferred                  Social work referral [ ] Chaplaincy Referral [ ]  		  Other Symptoms:  [ ]All other review of systems negative     Palliative Performance Status Version 2:   See PPSv2 tool and score below         PHYSICAL EXAM:  Vital Signs Last 24 Hrs  T(C): 36.6 (07 Aug 2023 04:47), Max: 36.6 (07 Aug 2023 04:47)  T(F): 97.8 (07 Aug 2023 04:47), Max: 97.8 (07 Aug 2023 04:47)  HR: 77 (07 Aug 2023 04:47) (60 - 77)  BP: 108/42 (07 Aug 2023 04:47) (108/42 - 110/69)  RR: 18 (07 Aug 2023 04:47) (18 - 18)  SpO2: 96% (07 Aug 2023 04:47) (96% - 97%)    Parameters below as of 07 Aug 2023 04:47  Patient On (Oxygen Delivery Method): room air     I&O's Summary    06 Aug 2023 07:01  -  07 Aug 2023 07:00  --------------------------------------------------------  IN: 540 mL / OUT: 950 mL / NET: -410 mL       GENERAL: [ ]Cachexia    [x]Alert  [x ]Oriented x1-2  [ ]Lethargic  [ ]Unarousable  [ ]Verbal  [ ]Non-Verbal  Behavioral:   [ ]Anxiety  [ ]Delirium [ ]Agitation [ ]Other  HEENT:  [ ]Normal   [ ]Dry mouth   [ ]ET Tube/Trach  [ ]Oral lesions  PULMONARY:   [x ]Clear [ ]Tachypnea  [ ]Audible excessive secretions   [ ]Rhonchi        [ ]Right [ ]Left [ ]Bilateral  [x ]Crackles        [ ]Right [ ]Left [x ]Bilateral  [ ]Wheezing     [ ]Right [ ]Left [ ]Bilateral  [ ]Diminished BS [ ] Right [ ]Left [ ]Bilateral  CARDIOVASCULAR:    [x ]Regular [ ]Irregular [ ]Tachy  [ ]Melo [ ]Murmur [ ]Other  GASTROINTESTINAL:  [x ]Soft  [ ]Distended   [x ]+BS  [ ]Non tender [ ]Tender  [ ]Other [ ]PEG [ ]OGT/ NGT   Last BM:   GENITOURINARY:  [ ]Normal [ ]Incontinent   [ ]Oliguria/Anuria   [ ]Martinez  MUSCULOSKELETAL:   [ ]Normal   [ ]Weakness  [ ]Bed/Wheelchair bound [ ]Edema Unable to assess.    NEUROLOGIC:   [ ]No focal deficits  [ ] Cognitive impairment  [ ] Dysphagia [ ]Dysarthria [ ] Paresis [ ]Other Unable to assess. Pupils reactive.   SKIN:   [ ]Normal  [ ]Rash  [ ]Other  [ ]Pressure ulcer(s) [ ]y [ ]n present on admission    CRITICAL CARE:  [ ]Shock Present  [ ]Septic [ ]Cardiogenic [ ]Neurologic [ ]Hypovolemic  [ ]Vasopressors [ ]Inotropes  [ ]Respiratory failure present [ ]Mechanical Ventilation [ ]Non-invasive ventilatory support [ ]High-Flow   [ ]Acute  [ ]Chronic [ ]Hypoxic  [ ]Hypercarbic [ ]Other  [ ]Other organ failure     LABS:                        10.7   10.32 )-----------( 336      ( 07 Aug 2023 09:59 )             34.0   08-07    145  |  106  |  42<H>  ----------------------------<  85  3.3<L>   |  21<L>  |  1.93<H>    Ca    9.2      07 Aug 2023 09:59  Mg     2.0     08-06    Urinalysis Basic - ( 07 Aug 2023 09:59 )  Color: x / Appearance: x / SG: x / pH: x  Gluc: 85 mg/dL / Ketone: x  / Bili: x / Urobili: x   Blood: x / Protein: x / Nitrite: x   Leuk Esterase: x / RBC: x / WBC x   Sq Epi: x / Non Sq Epi: x / Bacteria: x    RADIOLOGY & ADDITIONAL STUDIES:  Protein Calorie Malnutrition Present: [ ]mild [ ]moderate [ ]severe [ ]underweight [ ]morbid obesity  https://www.andeal.org/vault/2440/web/files/ONC/Table_Clinical%20Characteristics%20to%20Document%20Malnutrition-White%20JV%20et%20al%202012.pdf    Height (cm): 175.3 (08-02-23 @ 17:21), 175.3 (06-28-23 @ 15:50)  Weight (kg): 77.1 (08-02-23 @ 17:21), 77.1 (06-28-23 @ 15:50)  BMI (kg/m2): 25.1 (08-02-23 @ 17:21), 25.1 (06-28-23 @ 15:50)    [ ]PPSV2 < or = 30%  [ ]significant weight loss [ ]poor nutritional intake [ ]anasarcaPrealbumin, Serum: 10 mg/dL (08-04-23 @ 06:14)  [ ]Artificial Nutrition    Other REFERRALS:  [ ]Hospice  [ ]Child Life  [ ]Social Work  [ ]Case management [ ]Holistic Therapy     Goals of Care Document:

## 2023-08-07 NOTE — PROGRESS NOTE ADULT - ASSESSMENT
89M w/ hx of CAD s/p CABG, s/p PPM, CHF, seizures, CKD, anemia, HTN, recent UTI p/w decreased PO and weakness. Pt has been very weak and almost bed bound for the past month. Pt has hx of UTIs requiring admission in the past. Around June 4th daughter became concerned over change in pt's mood and awareness. Called PMD on phone and was prescribed 1 week course of Levaquin. Pt did not seem to improve significantly and pt went to see urologist around 6/14. Reportedly urine was checked and pt was switched to Bactrim. Pt otherwise compliant with meds as they are provided by [facility].   He was transferred to ER due to FTT for over last month. He was tried and given supportive IVF and Mirtazapine but not improved. He was found with UTI with ESBL and was treated with IV INVANZ for a few days. He started having cough for  afew days, COVID PCR was negative and CXR did not show any infiltration,     FTT   Continue supportive IV fluid, continue mirtazapine,  discuss with the daughter about palliative, awaiting decision,  Discussed with the family starting him on mirtazapine and encourage PO intake, f/u with the dietitian, and monitor weight. Check TSH prealbumin CBC and CMP.   Patient does not wish for any peg tube or TF.     Pulmonary edema  b/l pleural effusion. Resolved. Switched IV Lasix to PO. Lasix daily. 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 Patient not candidate for surgery. Consult with palliative recommended comfort care and hospice at home.     recurring UTI:  new UC positive with Enterococcus. Check with ID if he needs to get IV Vancomycin.     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     Urinary retention  resolved off meds.    Incidental Lung Nodule   Given tobacco use hx consider repeat imaging as outpatient with PCP    CKD3   Avoid nephrotoxins, renally dose meds, Trend creatinin      DVT ppx   Lovenox 30 mg due to Crcl below 30    HTN:  Taper metoprolol tartrate due to HTN and bradycardia 12.5 MG BID     Hx of SH  According is daughter monitor      Mild PVD:  Continue Aspirin and atorvastatin.      DVT ppx   Lovenox 40 mg SC daily

## 2023-08-07 NOTE — PROGRESS NOTE ADULT - ATTENDING COMMENTS
89M with CAD s/p CABG, s/p PPM, CHF, seizures, CKD, anemia, HTN, recent UT, severe AS, p/w decreased PO and weakness. Geriatrics and Palliative Medicine Team is consulted for assistance with GOC.     Team met with pt, caregiver, dtr/HCP Sekou, and spoke with pt's son Alexsander on the phone. primary attg Dr. Finn was able to partly join our encounter. Family reports pt was previously very active, loves to eat, and enjoys family's company. Over the recent weeks, pt with progressive functional decline along with recurrent hospitalizations. Family asking about a feeding tube, to which we advised is not recommended given severe AS and pt with ability to eat on his own. Discussed a future of likely continued decline, and explored what would be adequate quality of life for the pt. Code status discussed and established to be DNR/I by pt's HCP/dtr Sekou. Family inquiring about home hospice services. They will further discuss privately before arriving at a decision.     Family has our contact information should any questions arise. Next step [facility] vs home with hospice pending family decisions.    An MD Ankur  GAP Team Consults  Please call if we can be of assistance, [phone]

## 2023-08-07 NOTE — PROGRESS NOTE ADULT - PROBLEM SELECTOR PLAN 6
- Code status established to be DNR/I, MOLST completed for chart  - Hospice services discussed, family in consideration of a home hospice referral, they will privately discuss before coming to a final decision  - Geriatrics and Palliative Medicine Team remain avaiable, family has our contact information should any questions arise

## 2023-08-07 NOTE — PROGRESS NOTE ADULT - SUBJECTIVE AND OBJECTIVE BOX
Patient is a 89y old  Male who presents with a chief complaint of FTT (07 Aug 2023 14:09)      INTERVAL HPI/OVERNIGHT EVENTS: Patient clinically improved. No SOB. Diuresis improved his CXR. New CXR showed no pleural effusion on R side.  We  can switch Lasix to PO. Kidney function improved down to  1.6. Due to his severe aortic stenosis patient not candidate  for any surgery or TAVR. Palliative team discussed with family and decided to make him hospice. His UC positive with Enterococcus however he does not have any urinary symptoms. Family requested to treat him with IV Vancomycin. I asked ID to give input  if really needs to be treated with IV [id number] as most likely colonization Will wait for ID recommendation.     Pain Location & Control:     MEDICATIONS  (STANDING):  aspirin enteric coated 81 milliGRAM(s) Oral daily  atorvastatin 80 milliGRAM(s) Oral at bedtime  chlorhexidine 2% Cloths 1 Application(s) Topical <User Schedule>  enoxaparin Injectable 30 milliGRAM(s) SubCutaneous every 24 hours  folic acid 1 milliGRAM(s) Oral daily  furosemide   Injectable 20 milliGRAM(s) IV Push daily  levETIRAcetam 500 milliGRAM(s) Oral two times a day  metoprolol tartrate 25 milliGRAM(s) Oral two times a day  mirtazapine 15 milliGRAM(s) Oral daily  mupirocin 2% Nasal 1 Application(s) Both Nostrils two times a day  pantoprazole    Tablet 40 milliGRAM(s) Oral before breakfast    MEDICATIONS  (PRN):      Allergies    No Known Allergies    Intolerances        REVIEW OF SYSTEMS:  CONSTITUTIONAL: No fever, weight loss, or fatigue  EYES: No eye pain, visual disturbances, or discharge  ENMT:  No difficulty hearing, tinnitus, vertigo; No sinus or throat pain  NECK: No pain or stiffness  BREASTS: No pain, masses, or nipple discharge  RESPIRATORY: No cough, wheezing, chills or hemoptysis; No shortness of breath  CARDIOVASCULAR: No chest pain, palpitations, dizziness, or leg swelling  GASTROINTESTINAL: No abdominal or epigastric pain. No nausea, vomiting, or hematemesis; No diarrhea or constipation. No melena or hematochezia.  GENITOURINARY: No dysuria, frequency, hematuria, or incontinence  NEUROLOGICAL: No headaches, memory loss, loss of strength, numbness, or tremors  SKIN: No itching, burning, rashes, or lesions   LYMPH NODES: No enlarged glands  ENDOCRINE: No heat or cold intolerance; No hair loss; No polydipsia or polyuria  MUSCULOSKELETAL: No back pain  PSYCHIATRIC: No depression, anxiety, mood swings, or difficulty sleeping  HEME/LYMPH: No easy bruising, or bleeding gums  ALLERGY AND IMMUNOLOGIC: No hives or eczema    Vital Signs Last 24 Hrs  T(C): 36.3 (07 Aug 2023 11:10), Max: 36.6 (07 Aug 2023 04:47)  T(F): 97.3 (07 Aug 2023 11:10), Max: 97.8 (07 Aug 2023 04:47)  HR: 57 (07 Aug 2023 11:10) (57 - 77)  BP: 123/75 (07 Aug 2023 11:10) (108/42 - 123/75)  BP(mean): --  RR: 18 (07 Aug 2023 11:10) (18 - 18)  SpO2: 96% (07 Aug 2023 11:10) (96% - 97%)    Parameters below as of 07 Aug 2023 11:10  Patient On (Oxygen Delivery Method): room air        PHYSICAL EXAM:  GENERAL: NAD, well-groomed, well-developed  HEAD:  Atraumatic, Normocephalic  EYES: EOMI, PERRLA, conjunctiva and sclera clear  ENMT: No tonsillar erythema, exudates, or enlargement; Moist mucous membranes, Good dentition, No lesions  NECK: Supple, No JVD, Normal thyroid  NERVOUS SYSTEM:  Alert & Oriented X3, Good concentration; Motor Strength 5/5 B/L upper and lower extremities; DTRs 2+ intact and symmetric  CHEST/LUNG: Clear to auscultation bilaterally; No rales, rhonchi, wheezing, or rubs  HEART: Regular rate and rhythm; No murmurs, rubs, or gallops  ABDOMEN: Soft, Nontender, Nondistended; Bowel sounds present  EXTREMITIES:  2+ Peripheral Pulses, No clubbing or cyanosis  LYMPH: No lymphadenopathy noted  SKIN: No rashes or lesions      LABS:                        10.7   10.32 )-----------( 336      ( 07 Aug 2023 09:59 )             34.0     07 Aug 2023 09:59    145    |  106    |  42     ----------------------------<  85     3.3     |  21     |  1.93     Ca    9.2        07 Aug 2023 09:59        Urinalysis Basic - ( 07 Aug 2023 09:59 )    Color: x / Appearance: x / SG: x / pH: x  Gluc: 85 mg/dL / Ketone: x  / Bili: x / Urobili: x   Blood: x / Protein: x / Nitrite: x   Leuk Esterase: x / RBC: x / WBC x   Sq Epi: x / Non Sq Epi: x / Bacteria: x      CAPILLARY BLOOD GLUCOSE            Cultures  Culture Results:   10,000 - 49,000 CFU/mL Enterococcus faecium  <10,000 CFU/ml Normal Urogenital edouard present (08-02-23 @ 21:57)      RADIOLOGY & ADDITIONAL TESTS:    Imaging Personally Reviewed:  [X ] YES  [ ] NO    Consultant(s) Notes Reviewed:  [ X] YES  [ ] NO    Care Discussed with Consultants/Other Providers [X ] YES  [ ] NO

## 2023-08-07 NOTE — CONSULT NOTE ADULT - ASSESSMENT
Patient to be seen today 89M w/ hx of CAD s/p CABG, s/p PPM, CHF, seizures, CKD, anemia, HTN, recent UTI p/w decreased PO and weakness.    # Recurrent UTI/ uncomplicated  - multiple recent treatment course; continues to decline clinically  - UCx now with E. fecium; sensitive to Vancomycin  - Linezolid could be an oral option although not ideal one  - Would need IV vancomycin; if were to treat his urine cultures  - Doubt that treating possible UTI would make significant benefit in his overall clinical course; he remains without symptoms  - Would rechcek UA and Ucx    Discussed with attending and Primary service 89M w/ hx of CAD s/p CABG, s/p PPM, CHF, seizures, CKD, anemia, HTN, recent UTI p/w decreased PO and weakness.    # Recurrent UTI/ uncomplicated  - multiple recent treatment course; continues to decline clinically  - UCx now with E. fecium; sensitive to Vancomycin  - Linezolid might  be an oral option although not ideal one, will need to check sensitivities   - Would need IV vancomycin; if were to treat his urine cultures  - Doubt that treating possible UTI would make significant benefit in his overall clinical course; he remains without symptoms  - Would rechcek UA and Ucx    Discussed with attending and Primary service

## 2023-08-07 NOTE — PROGRESS NOTE ADULT - SUBJECTIVE AND OBJECTIVE BOX
Date of Service: 08-07-23 @ 08:41           CARDIOLOGY     PROGRESS  NOTE   ________________________________________________    CHIEF COMPLAINT:Patient is a 89y old  Male who presents with a chief complaint of FTT (06 Aug 2023 17:40)    	  REVIEW OF SYSTEMS:  CONSTITUTIONAL: No fever, weight loss, or fatigue  EYES: No eye pain, visual disturbances, or discharge  ENT:  No difficulty hearing, tinnitus, vertigo; No sinus or throat pain  NECK: No pain or stiffness  RESPIRATORY: No cough, wheezing, chills or hemoptysis; No Shortness of Breath  CARDIOVASCULAR: No chest pain, palpitations, passing out, dizziness, or leg swelling  GASTROINTESTINAL: No abdominal or epigastric pain. No nausea, vomiting, or hematemesis; No diarrhea or constipation. No melena or hematochezia.  GENITOURINARY: No dysuria, frequency, hematuria, or incontinence  NEUROLOGICAL: No headaches, memory loss, loss of strength, numbness, or tremors  SKIN: No itching, burning, rashes, or lesions   LYMPH Nodes: No enlarged glands  ENDOCRINE: No heat or cold intolerance; No hair loss  MUSCULOSKELETAL: No joint pain or swelling; No muscle, back, or extremity pain  PSYCHIATRIC: No depression, anxiety, mood swings, or difficulty sleeping  HEME/LYMPH: No easy bruising, or bleeding gums  ALLERGY AND IMMUNOLOGIC: No hives or eczema	    [ ] All others negative	  [ ] Unable to obtain    PHYSICAL EXAM:  T(C): 36.6 (08-07-23 @ 04:47), Max: 36.6 (08-07-23 @ 04:47)  HR: 77 (08-07-23 @ 04:47) (60 - 77)  BP: 108/42 (08-07-23 @ 04:47) (108/42 - 110/69)  RR: 18 (08-07-23 @ 04:47) (18 - 18)  SpO2: 96% (08-07-23 @ 04:47) (96% - 97%)  Wt(kg): --  I&O's Summary    06 Aug 2023 07:01  -  07 Aug 2023 07:00  --------------------------------------------------------  IN: 540 mL / OUT: 950 mL / NET: -410 mL        Appearance: Normal	  HEENT:   Normal oral mucosa, PERRL, EOMI	  Lymphatic: No lymphadenopathy  Cardiovascular: Normal S1 S2, No JVD, + murmurs, No edema  Respiratory: rhgonchi  Psychiatry: A & O x 3, Mood & affect appropriate  Gastrointestinal:  Soft, Non-tender, + BS	  Skin: No rashes, No ecchymoses, No cyanosis	  Neurologic: Non-focal  Extremities: Normal range of motion, No clubbing, cyanosis or edema  Vascular: Peripheral pulses palpable 2+ bilaterally    MEDICATIONS  (STANDING):  aspirin enteric coated 81 milliGRAM(s) Oral daily  atorvastatin 80 milliGRAM(s) Oral at bedtime  chlorhexidine 2% Cloths 1 Application(s) Topical <User Schedule>  enoxaparin Injectable 30 milliGRAM(s) SubCutaneous every 24 hours  folic acid 1 milliGRAM(s) Oral daily  furosemide   Injectable 20 milliGRAM(s) IV Push daily  levETIRAcetam 500 milliGRAM(s) Oral two times a day  metoprolol tartrate 25 milliGRAM(s) Oral two times a day  mirtazapine 15 milliGRAM(s) Oral daily  mupirocin 2% Nasal 1 Application(s) Both Nostrils two times a day  pantoprazole    Tablet 40 milliGRAM(s) Oral before breakfast      TELEMETRY: 	    ECG:  	  RADIOLOGY:  OTHER: 	  	  LABS:	 	    CARDIAC MARKERS:                                11.3   11.68 )-----------( 376      ( 06 Aug 2023 06:33 )             36.3     08-06    144  |  108  |  42<H>  ----------------------------<  87  3.6   |  16<L>  |  1.99<H>    Ca    9.2      06 Aug 2023 06:32  Mg     2.0     08-06      proBNP:   Lipid Profile: Cholesterol 88  LDL --  HDL 31  TG 81    HgA1c:   TSH: Thyroid Stimulating Hormone, Serum: 2.30 uIU/mL (08-04 @ 06:14)    Assessment and plan  ---------------------------  89M w/ hx of CAD s/p CABG, s/p PPM, CHF, seizures, CKD, anemia, HTN, recent UTI p/w decreased PO and weakness. Pt has been very weak and almost bed bound for the past month. Pt has hx of UTIs requiring admission in the past. Around June 4th daughter became concerned over change in pt's mood and awareness. Called PMD on phone and was prescribed 1 week course of Levaquin. Pt did not seem to improve significantly and pt went to see urologist around 6/14. Reportedly urine was checked and pt was switched to Bactrim. Pt otherwise compliant with meds as they are provided by [facility].    chf ?hfpef  check TTE  continue current meds  Lasix 20 mg iv bid  ct chest no contrast noted with bl pleural effusion  will adjust cardiac meds  tele  cardiac enzyme  anemia, check guaiac check cbc  Protonix  abx for UTI  awaiting blood work  echo noted with SEVERE AS as the cause of chf and hx of CAD, need to discuss with family and health care proxy  continue gentle diuresis  Avoid hypotension/ ACRE or ARB  will repeat chest x ray today  not a surgical candidate, spoke to daughter yesterday

## 2023-08-07 NOTE — PROGRESS NOTE ADULT - ASSESSMENT
Pt is a 89M w/ hx of CAD s/p CABG, s/p PPM, CHF, severe aortic stenosis, seizures, CKD, anemia, HTN, recent UTI p/w decreased PO and generalized weakness and concerns for failure to thrive. Palliative care consulted for GOC . MOLST form filled out 8/7, pt made DNR/DNI. Pending dispo to rehab vs hospice vs home.  Pt is a 89M w/ hx of CAD s/p CABG, s/p PPM, CHF, severe aortic stenosis, seizures, CKD, anemia, HTN, recent UTI p/w decreased PO and generalized weakness and concerns for failure to thrive. Palliative care consulted for GOC. MOLST form filled out 8/7, pt made DNR/DNI. Pending dispo to rehab vs home with hospice.

## 2023-08-07 NOTE — PROGRESS NOTE ADULT - PROBLEM SELECTOR PLAN 4
See GOC above  Pt has 4 children: Jesus Reveles, Sekou, Umer. HCP scanned into chart assigning Jennifer.   MOLST form filled out today, DNR/DNI. HCP: document brought in by family, dtr Sekou and son Jesus on the document. Pt is  and has 4 children total, Anushka, Jesus, Sekou, Umer   MOLST form filled out today, DNR/DNI.  GOC: discussed disease trajectory, palliative vs hospice. Hao Beltre is flying in on Thursday to visit with pt. Family to privately discuss prior to making a decision about hospice.

## 2023-08-07 NOTE — CONSULT NOTE ADULT - SUBJECTIVE AND OBJECTIVE BOX
Patient is a 89y old  Male who presents with a chief complaint of FTT (07 Aug 2023 13:01)    HPI:  89M w/ hx of CAD s/p CABG, s/p PPM, CHF, seizures, CKD, anemia, HTN, recent UTI p/w decreased PO and weakness. Pt has been very weak and almost bed bound for the past month. Pt has hx of UTIs requiring admission in the past.  Treated with levaquin early June, switched to bactrim after urology evaluation on 6/14; no further details, culture data available.  Was again treated for UTI  June 28-7/3 with ceftriaxone and Vantin, Urine cultures came back ESBL E coli hence received further doses of ertapenem.          prior hospital charts reviewed [  x]  primary team notes reviewed [x  ]  other consultant notes reviewed [ x ]    PAST MEDICAL & SURGICAL HISTORY:  CHF, chronic      CAD (coronary artery disease)      Essential hypertension      FH: CABG (coronary artery bypass surgery)        Allergies  No Known Allergies    ANTIMICROBIALS (past 90 days)  MEDICATIONS  (STANDING):          MEDICATIONS  (STANDING):  aspirin enteric coated 81 daily  atorvastatin 80 at bedtime  enoxaparin Injectable 30 every 24 hours  furosemide   Injectable 20 daily  levETIRAcetam 500 two times a day  metoprolol tartrate 25 two times a day  mirtazapine 15 daily  pantoprazole    Tablet 40 before breakfast    SOCIAL HISTORY:       FAMILY HISTORY:    REVIEW OF SYSTEMS  [  ] ROS unobtainable because:    [  ] All other systems negative except as noted below:	    Constitutional:  [ ] fever [ ] chills  [ ] weight loss  [ ] weakness  Skin:  [ ] rash [ ] phlebitis	  Eyes: [ ] icterus [ ] pain  [ ] discharge	  ENMT: [ ] sore throat  [ ] thrush [ ] ulcers [ ] exudates  Respiratory: [ ] dyspnea [ ] hemoptysis [ ] cough [ ] sputum	  Cardiovascular:  [ ] chest pain [ ] palpitations [ ] edema	  Gastrointestinal:  [ ] nausea [ ] vomiting [ ] diarrhea [ ] constipation [ ] pain	  Genitourinary:  [ ] dysuria [ ] frequency [ ] hematuria [ ] discharge [ ] flank pain  [ ] incontinence  Musculoskeletal:  [ ] myalgias [ ] arthralgias [ ] arthritis  [ ] back pain  Neurological:  [ ] headache [ ] seizures  [ ] confusion/altered mental status  Psychiatric:  [ ] anxiety [ ] depression	  Hematology/Lymphatics:  [ ] lymphadenopathy  Endocrine:  [ ] adrenal [ ] thyroid  Allergic/Immunologic:	 [ ] transplant [ ] seasonal    Vital Signs Last 24 Hrs  T(F): 97.3 (08-07-23 @ 11:10), Max: 98.3 (08-02-23 @ 19:54)  Vital Signs Last 24 Hrs  HR: 57 (08-07-23 @ 11:10) (57 - 77)  BP: 123/75 (08-07-23 @ 11:10) (108/42 - 123/75)  RR: 18 (08-07-23 @ 11:10)  SpO2: 96% (08-07-23 @ 11:10) (96% - 97%)  Wt(kg): --    PHYSICAL EXAM:  Constitutional: non-toxic, no distress  HEAD/EYES: anicteric, no conjunctival injection  ENT:  supple, no thrush  Cardiovascular:   normal S1, S2, no murmur, no edema  Respiratory:  clear BS bilaterally, no wheezes, no rales  GI:  soft, non-tender, normal bowel sounds  :  no cornejo, no CVA tenderness  Musculoskeletal:  no synovitis, normal ROM  Neurologic: awake and alert, normal strength, no focal findings  Skin:  no rash, no erythema, no phlebitis  Heme/Onc: no lymphadenopathy   Psychiatric:  awake, alert, appropriate mood                            10.7   10.32 )-----------( 336      ( 07 Aug 2023 09:59 )             34.0   08-07    145  |  106  |  42<H>  ----------------------------<  85  3.3<L>   |  21<L>  |  1.93<H>    Ca    9.2      07 Aug 2023 09:59  Mg     2.0     08-06      Urinalysis Basic - ( 07 Aug 2023 09:59 )    Color: x / Appearance: x / SG: x / pH: x  Gluc: 85 mg/dL / Ketone: x  / Bili: x / Urobili: x   Blood: x / Protein: x / Nitrite: x   Leuk Esterase: x / RBC: x / WBC x   Sq Epi: x / Non Sq Epi: x / Bacteria: x    MICROBIOLOGY:  Culture - Urine (collected 02 Aug 2023 21:57)  Source: Clean Catch Clean Catch (Midstream)  Final Report (06 Aug 2023 09:28):    10,000 - 49,000 CFU/mL Enterococcus faecium    <10,000 CFU/ml Normal Urogenital edouard present  Organism: Enterococcus faecium (06 Aug 2023 09:28)  Organism: Enterococcus faecium (06 Aug 2023 09:28)      Method Type: ISABELLA      -  Ampicillin: R >8 Predicts results to ampicillin/sulbactam, amoxacillin-clavulanate and  piperacillin-tazobactam.      -  Ciprofloxacin: R >2      -  Levofloxacin: R >4      -  Nitrofurantoin: R >64 Should not be used to treat pyelonephritis.      -  Tetracycline: R >8      -  Vancomycin: S 0.5                  RADIOLOGY:  imaging below personally reviewed and agree with findings    < from: Xray Chest 1 View- PORTABLE-Routine (Xray Chest 1 View- PORTABLE-Routine in AM.) (08.07.23 @ 09:30) >    ACC: [id number] EXAM:  XR CHEST PORTABLE ROUTINE 1V   ORDERED BY: ZUNILDA LOVELL     PROCEDURE DATE:  08/07/2023          INTERPRETATION:  CLINICAL INDICATION: Cough.    EXAM: Frontal radiograph of the chest.    COMPARISON: Chest radiograph from 6/28/2023. CT chest 8/2/2023.    FINDINGS:  Left chest wall pacemaker.  Median sternotomy and CABG.  No focal consolidation or large effusion.  No pneumothorax.  Azygous lobe noted, normal anatomic variant.  The heart size is normal.  The visualized osseous structures demonstrate no acute pathology.    IMPRESSION:  No focal consolidation or large effusion.    < end of copied text >  < from: CT Chest No Cont (08.02.23 @ 21:23) >      INTERPRETATION:  CLINICAL INFORMATION: Cough    COMPARISON: Renal ultrasound 6/29/2023.    CONTRAST/COMPLICATIONS:  IV Contrast: NONE  Oral Contrast: NONE  Complications: None reported at time of study completion    PROCEDURE:  CT of the Chest was performed.  Sagittal and coronal reformats were performed.    FINDINGS:    LUNGS PLEURA AND AIRWAYS: Patent central airways.  Small right effusion   with associated atelectasis. Trace left effusion with associated   atelectasis.  Azygos lobe, and normal variant.  There are scattered tiny 2 mm pulmonary nodules for example in the left   upper lobe (3:42). There are no dense consolidations.  MEDIASTINUM AND ASHLYN: No lymphadenopathy.  VESSELS: Atherosclerotic changes  HEART: Sternotomy. Cardiac leads. Coronary artery and valvular   calcifications. Heart size is normal. No pericardial effusion.  CHEST WALL AND LOWER NECK: Within normal limits.  VISUALIZED UPPER ABDOMEN: Within normal limits.  BONES: Degenerative changes. Old posterior right rib fractures.    IMPRESSION:    Bilateral pleural effusions with associated atelectasis as described   above. No definite evidence for pneumonia/infection.      < end of copied text >   Patient is a 89y old  Male who presents with a chief complaint of FTT (07 Aug 2023 13:01)    HPI:  89M w/ hx of CAD s/p CABG, s/p PPM, CHF, seizures, CKD, anemia, HTN, recent UTI p/w decreased PO and weakness. Pt has been very weak and almost bed bound for the past month. Pt has hx of UTIs requiring admission in the past.  Treated with Levaquin early June, switched to bactrim after urology evaluation on 6/14; no further details, culture data available.  Was again treated for UTI  June 28-7/3 with ceftriaxone and Vantin, Urine cultures came back ESBL E coli hence received further doses of ertapenem in the Nursing home. Now presenting with FTT as above.    Continues with poor appetite, afebrile and clinically stable. Per family does not have urinary symptoms at this time.    Urine Cx 8/2 with Enterococcus Fecium; ID consulted for further management.       prior hospital charts reviewed [  x]  primary team notes reviewed [x  ]  other consultant notes reviewed [ x ]    PAST MEDICAL & SURGICAL HISTORY:  CHF, chronic      CAD (coronary artery disease)      Essential hypertension      FH: CABG (coronary artery bypass surgery)        Allergies  No Known Allergies    ANTIMICROBIALS (past 90 days)  MEDICATIONS  (STANDING):          MEDICATIONS  (STANDING):  aspirin enteric coated 81 daily  atorvastatin 80 at bedtime  enoxaparin Injectable 30 every 24 hours  furosemide   Injectable 20 daily  levETIRAcetam 500 two times a day  metoprolol tartrate 25 two times a day  mirtazapine 15 daily  pantoprazole    Tablet 40 before breakfast    SOCIAL HISTORY:       FAMILY HISTORY:    REVIEW OF SYSTEMS  [  ] ROS unobtainable because:    [  ] All other systems negative except as noted below:	    Constitutional:  [ ] fever [ ] chills  [ ] weight loss  [ ] weakness  Skin:  [ ] rash [ ] phlebitis	  Eyes: [ ] icterus [ ] pain  [ ] discharge	  ENMT: [ ] sore throat  [ ] thrush [ ] ulcers [ ] exudates  Respiratory: [ ] dyspnea [ ] hemoptysis [ ] cough [ ] sputum	  Cardiovascular:  [ ] chest pain [ ] palpitations [ ] edema	  Gastrointestinal:  [ ] nausea [ ] vomiting [ ] diarrhea [ ] constipation [ ] pain	  Genitourinary:  [ ] dysuria [ ] frequency [ ] hematuria [ ] discharge [ ] flank pain  [ ] incontinence  Musculoskeletal:  [ ] myalgias [ ] arthralgias [ ] arthritis  [ ] back pain  Neurological:  [ ] headache [ ] seizures  [ ] confusion/altered mental status  Psychiatric:  [ ] anxiety [ ] depression	  Hematology/Lymphatics:  [ ] lymphadenopathy  Endocrine:  [ ] adrenal [ ] thyroid  Allergic/Immunologic:	 [ ] transplant [ ] seasonal    Vital Signs Last 24 Hrs  T(F): 97.3 (08-07-23 @ 11:10), Max: 98.3 (08-02-23 @ 19:54)  Vital Signs Last 24 Hrs  HR: 57 (08-07-23 @ 11:10) (57 - 77)  BP: 123/75 (08-07-23 @ 11:10) (108/42 - 123/75)  RR: 18 (08-07-23 @ 11:10)  SpO2: 96% (08-07-23 @ 11:10) (96% - 97%)  Wt(kg): --    PHYSICAL EXAM:  Constitutional: non-toxic, no distress  HEAD/EYES: anicteric, no conjunctival injection  ENT:  supple, no thrush  Cardiovascular:   normal S1, S2, no murmur, no edema  Respiratory:  clear BS bilaterally, no wheezes, no rales  GI:  soft, non-tender, normal bowel sounds  :  no cornejo, no CVA tenderness  Musculoskeletal:  no synovitis, normal ROM  Neurologic: awake and alert, normal strength, no focal findings  Skin:  no rash, no erythema, no phlebitis  Heme/Onc: no lymphadenopathy   Psychiatric:  awake, alert, appropriate mood                            10.7   10.32 )-----------( 336      ( 07 Aug 2023 09:59 )             34.0   08-07    145  |  106  |  42<H>  ----------------------------<  85  3.3<L>   |  21<L>  |  1.93<H>    Ca    9.2      07 Aug 2023 09:59  Mg     2.0     08-06      Urinalysis Basic - ( 07 Aug 2023 09:59 )    Color: x / Appearance: x / SG: x / pH: x  Gluc: 85 mg/dL / Ketone: x  / Bili: x / Urobili: x   Blood: x / Protein: x / Nitrite: x   Leuk Esterase: x / RBC: x / WBC x   Sq Epi: x / Non Sq Epi: x / Bacteria: x    MICROBIOLOGY:  Culture - Urine (collected 02 Aug 2023 21:57)  Source: Clean Catch Clean Catch (Midstream)  Final Report (06 Aug 2023 09:28):    10,000 - 49,000 CFU/mL Enterococcus faecium    <10,000 CFU/ml Normal Urogenital edouard present  Organism: Enterococcus faecium (06 Aug 2023 09:28)  Organism: Enterococcus faecium (06 Aug 2023 09:28)      Method Type: ISABELLA      -  Ampicillin: R >8 Predicts results to ampicillin/sulbactam, amoxacillin-clavulanate and  piperacillin-tazobactam.      -  Ciprofloxacin: R >2      -  Levofloxacin: R >4      -  Nitrofurantoin: R >64 Should not be used to treat pyelonephritis.      -  Tetracycline: R >8      -  Vancomycin: S 0.5                  RADIOLOGY:  imaging below personally reviewed and agree with findings    < from: Xray Chest 1 View- PORTABLE-Routine (Xray Chest 1 View- PORTABLE-Routine in AM.) (08.07.23 @ 09:30) >    ACC: [id number] EXAM:  XR CHEST PORTABLE ROUTINE 1V   ORDERED BY: ZUNILDA LOVELL     PROCEDURE DATE:  08/07/2023          INTERPRETATION:  CLINICAL INDICATION: Cough.    EXAM: Frontal radiograph of the chest.    COMPARISON: Chest radiograph from 6/28/2023. CT chest 8/2/2023.    FINDINGS:  Left chest wall pacemaker.  Median sternotomy and CABG.  No focal consolidation or large effusion.  No pneumothorax.  Azygous lobe noted, normal anatomic variant.  The heart size is normal.  The visualized osseous structures demonstrate no acute pathology.    IMPRESSION:  No focal consolidation or large effusion.    < end of copied text >  < from: CT Chest No Cont (08.02.23 @ 21:23) >      INTERPRETATION:  CLINICAL INFORMATION: Cough    COMPARISON: Renal ultrasound 6/29/2023.    CONTRAST/COMPLICATIONS:  IV Contrast: NONE  Oral Contrast: NONE  Complications: None reported at time of study completion    PROCEDURE:  CT of the Chest was performed.  Sagittal and coronal reformats were performed.    FINDINGS:    LUNGS PLEURA AND AIRWAYS: Patent central airways.  Small right effusion   with associated atelectasis. Trace left effusion with associated   atelectasis.  Azygos lobe, and normal variant.  There are scattered tiny 2 mm pulmonary nodules for example in the left   upper lobe (3:42). There are no dense consolidations.  MEDIASTINUM AND ASHLYN: No lymphadenopathy.  VESSELS: Atherosclerotic changes  HEART: Sternotomy. Cardiac leads. Coronary artery and valvular   calcifications. Heart size is normal. No pericardial effusion.  CHEST WALL AND LOWER NECK: Within normal limits.  VISUALIZED UPPER ABDOMEN: Within normal limits.  BONES: Degenerative changes. Old posterior right rib fractures.    IMPRESSION:    Bilateral pleural effusions with associated atelectasis as described   above. No definite evidence for pneumonia/infection.      < end of copied text >   Patient is a 89y old  Male who presents with a chief complaint of FTT (07 Aug 2023 13:01)    HPI:  89M w/ hx of CAD s/p CABG, s/p PPM, CHF, seizures, CKD, anemia, HTN, recent UTI p/w decreased PO and weakness. Pt has been very weak and almost bed bound for the past month. Pt has hx of UTIs requiring admission in the past.  Treated with Levaquin early June, switched to bactrim after urology evaluation on 6/14; no further details, culture data available.  Was again treated for UTI  June 28-7/3 with ceftriaxone and Vantin, Urine cultures came back ESBL E coli hence received further doses of ertapenem in the Nursing home. Now presenting with FTT as above.    Continues with poor appetite, afebrile and clinically stable. Per family does not have urinary symptoms at this time.    Urine Cx 8/2 with Enterococcus Fecium; ID consulted for further management.       prior hospital charts reviewed [  x]  primary team notes reviewed [x  ]  other consultant notes reviewed [ x ]    PAST MEDICAL & SURGICAL HISTORY:  CHF, chronic      CAD (coronary artery disease)      Essential hypertension      FH: CABG (coronary artery bypass surgery)        Allergies  No Known Allergies    ANTIMICROBIALS (past 90 days)  MEDICATIONS  (STANDING):          MEDICATIONS  (STANDING):  aspirin enteric coated 81 daily  atorvastatin 80 at bedtime  enoxaparin Injectable 30 every 24 hours  furosemide   Injectable 20 daily  levETIRAcetam 500 two times a day  metoprolol tartrate 25 two times a day  mirtazapine 15 daily  pantoprazole    Tablet 40 before breakfast    SOCIAL HISTORY:       FAMILY HISTORY:    REVIEW OF SYSTEMS  [  ] ROS unobtainable because:    [  ] All other systems negative except as noted below:	    Constitutional:  [ ] fever [ ] chills  [ ] weight loss  [ ] weakness  Skin:  [ ] rash [ ] phlebitis	  Eyes: [ ] icterus [ ] pain  [ ] discharge	  ENMT: [ ] sore throat  [ ] thrush [ ] ulcers [ ] exudates  Respiratory: [ ] dyspnea [ ] hemoptysis [ ] cough [ ] sputum	  Cardiovascular:  [ ] chest pain [ ] palpitations [ ] edema	  Gastrointestinal:  [ ] nausea [ ] vomiting [ ] diarrhea [ ] constipation [ ] pain	  Genitourinary:  [ ] dysuria [ ] frequency [ ] hematuria [ ] discharge [ ] flank pain  [ ] incontinence  Musculoskeletal:  [ ] myalgias [ ] arthralgias [ ] arthritis  [ ] back pain  Neurological:  [ ] headache [ ] seizures  [ ] confusion/altered mental status  Psychiatric:  [ ] anxiety [ ] depression	  Hematology/Lymphatics:  [ ] lymphadenopathy  Endocrine:  [ ] adrenal [ ] thyroid  Allergic/Immunologic:	 [ ] transplant [ ] seasonal    Vital Signs Last 24 Hrs  T(F): 97.3 (08-07-23 @ 11:10), Max: 98.3 (08-02-23 @ 19:54)  Vital Signs Last 24 Hrs  HR: 57 (08-07-23 @ 11:10) (57 - 77)  BP: 123/75 (08-07-23 @ 11:10) (108/42 - 123/75)  RR: 18 (08-07-23 @ 11:10)  SpO2: 96% (08-07-23 @ 11:10) (96% - 97%)  Wt(kg): --    PHYSICAL EXAM:  Constitutional: non-toxic, no distress poorly interactive  HEAD/EYES: anicteric, no conjunctival injection  ENT:  supple, no thrush  Cardiovascular:    S1, S2,  Respiratory:  clear BS bilaterally, no wheezes, no rales  GI:  soft, non-tender, normal bowel sounds  :  no cornejo, no CVA tenderness  Musculoskeletal:  no synovitis, normal ROM  Skin:  no rash, no erythema, no phlebitis  Heme/Onc: no lymphadenopathy   Psychiatric:  lethargic                             10.7   10.32 )-----------( 336      ( 07 Aug 2023 09:59 )             34.0   08-07    145  |  106  |  42<H>  ----------------------------<  85  3.3<L>   |  21<L>  |  1.93<H>    Ca    9.2      07 Aug 2023 09:59  Mg     2.0     08-06      Urinalysis (08.07.23 @ 21:46)    pH Urine: 5.5   Glucose Qualitative, Urine: Negative   Blood, Urine: Negative   Color: Light Yellow   Urine Appearance: Clear   Bilirubin: Negative   Ketone - Urine: Negative   Specific Gravity: 1.013   Protein, Urine: Negative   Urobilinogen: Negative   Nitrite: Negative   Leukocyte Esterase Concentration: Large  Urine Microscopic-Add On (NC) (08.07.23 @ 21:46)    Red Blood Cell - Urine: 1 /hpf   White Blood Cell - Urine: 26 /HPF   Hyaline Casts: 1 /lpf   Bacteria: Negative   Comment - Urine: many budding yeast seen   Squamous Epithelial Cells: 1 /hpf        MICROBIOLOGY:  Culture - Urine (collected 02 Aug 2023 21:57)  Source: Clean Catch Clean Catch (Midstream)  Final Report (06 Aug 2023 09:28):    10,000 - 49,000 CFU/mL Enterococcus faecium    <10,000 CFU/ml Normal Urogenital edouard present  Organism: Enterococcus faecium (06 Aug 2023 09:28)  Organism: Enterococcus faecium (06 Aug 2023 09:28)      Method Type: ISABELLA      -  Ampicillin: R >8 Predicts results to ampicillin/sulbactam, amoxacillin-clavulanate and  piperacillin-tazobactam.      -  Ciprofloxacin: R >2      -  Levofloxacin: R >4      -  Nitrofurantoin: R >64 Should not be used to treat pyelonephritis.      -  Tetracycline: R >8      -  Vancomycin: S 0.5                  RADIOLOGY:  imaging below personally reviewed and agree with findings    < from: Xray Chest 1 View- PORTABLE-Routine (Xray Chest 1 View- PORTABLE-Routine in AM.) (08.07.23 @ 09:30) >    ACC: [id number] EXAM:  XR CHEST PORTABLE ROUTINE 1V   ORDERED BY: ZUNILDA LOVELL     PROCEDURE DATE:  08/07/2023          INTERPRETATION:  CLINICAL INDICATION: Cough.    EXAM: Frontal radiograph of the chest.    COMPARISON: Chest radiograph from 6/28/2023. CT chest 8/2/2023.    FINDINGS:  Left chest wall pacemaker.  Median sternotomy and CABG.  No focal consolidation or large effusion.  No pneumothorax.  Azygous lobe noted, normal anatomic variant.  The heart size is normal.  The visualized osseous structures demonstrate no acute pathology.    IMPRESSION:  No focal consolidation or large effusion.    < end of copied text >  < from: CT Chest No Cont (08.02.23 @ 21:23) >      INTERPRETATION:  CLINICAL INFORMATION: Cough    COMPARISON: Renal ultrasound 6/29/2023.    CONTRAST/COMPLICATIONS:  IV Contrast: NONE  Oral Contrast: NONE  Complications: None reported at time of study completion    PROCEDURE:  CT of the Chest was performed.  Sagittal and coronal reformats were performed.    FINDINGS:    LUNGS PLEURA AND AIRWAYS: Patent central airways.  Small right effusion   with associated atelectasis. Trace left effusion with associated   atelectasis.  Azygos lobe, and normal variant.  There are scattered tiny 2 mm pulmonary nodules for example in the left   upper lobe (3:42). There are no dense consolidations.  MEDIASTINUM AND ASHLYN: No lymphadenopathy.  VESSELS: Atherosclerotic changes  HEART: Sternotomy. Cardiac leads. Coronary artery and valvular   calcifications. Heart size is normal. No pericardial effusion.  CHEST WALL AND LOWER NECK: Within normal limits.  VISUALIZED UPPER ABDOMEN: Within normal limits.  BONES: Degenerative changes. Old posterior right rib fractures.    IMPRESSION:    Bilateral pleural effusions with associated atelectasis as described   above. No definite evidence for pneumonia/infection.      < end of copied text >   Patient is a 89y old  Male who presents with a chief complaint of FTT (07 Aug 2023 13:01)    HPI:  89M w/ hx of CAD s/p CABG, s/p PPM, CHF, seizures, CKD, anemia, HTN, recent UTI p/w decreased PO and weakness. Pt has been very weak and almost bed bound for the past month. Pt has hx of UTIs requiring admission in the past.  Treated with Levaquin early June, switched to bactrim after urology evaluation on 6/14; no further details, culture data available.  Was again treated for UTI  June 28-7/3 with ceftriaxone and Vantin, Urine cultures came back ESBL E coli hence received further doses of ertapenem in the Nursing home. Now presenting with FTT as above.    Continues with poor appetite, afebrile and clinically stable. Per family does not have urinary symptoms at this time.    Urine Cx 8/2 with Enterococcus Fecium; ID consulted for further management.       prior hospital charts reviewed [  x]  primary team notes reviewed [x  ]  other consultant notes reviewed [ x ]    PAST MEDICAL & SURGICAL HISTORY:  CHF, chronic      CAD (coronary artery disease)      Essential hypertension      FH: CABG (coronary artery bypass surgery)        Allergies  No Known Allergies    ANTIMICROBIALS (past 90 days)  MEDICATIONS  (STANDING):          MEDICATIONS  (STANDING):  aspirin enteric coated 81 daily  atorvastatin 80 at bedtime  enoxaparin Injectable 30 every 24 hours  furosemide   Injectable 20 daily  levETIRAcetam 500 two times a day  metoprolol tartrate 25 two times a day  mirtazapine 15 daily  pantoprazole    Tablet 40 before breakfast    SOCIAL HISTORY:       FAMILY HISTORY:    REVIEW OF SYSTEMS  [  ] ROS unobtainable because:    [  ] All other systems negative except as noted below:	    Constitutional:  [ ] fever [ ] chills  [ ] weight loss  [ ] weakness  Skin:  [ ] rash [ ] phlebitis	  Eyes: [ ] icterus [ ] pain  [ ] discharge	  ENMT: [ ] sore throat  [ ] thrush [ ] ulcers [ ] exudates  Respiratory: [ ] dyspnea [ ] hemoptysis [ ] cough [ ] sputum	  Cardiovascular:  [ ] chest pain [ ] palpitations [ ] edema	  Gastrointestinal:  [ ] nausea [ ] vomiting [ ] diarrhea [ ] constipation [ ] pain	  Genitourinary:  [ ] dysuria [ ] frequency [ ] hematuria [ ] discharge [ ] flank pain  [ ] incontinence  Musculoskeletal:  [ ] myalgias [ ] arthralgias [ ] arthritis  [ ] back pain  Neurological:  [ ] headache [ ] seizures  [ ] confusion/altered mental status  Psychiatric:  [ ] anxiety [ ] depression	  Hematology/Lymphatics:  [ ] lymphadenopathy  Endocrine:  [ ] adrenal [ ] thyroid  Allergic/Immunologic:	 [ ] transplant [ ] seasonal    Vital Signs Last 24 Hrs  T(F): 97.3 (08-07-23 @ 11:10), Max: 98.3 (08-02-23 @ 19:54)  Vital Signs Last 24 Hrs  HR: 57 (08-07-23 @ 11:10) (57 - 77)  BP: 123/75 (08-07-23 @ 11:10) (108/42 - 123/75)  RR: 18 (08-07-23 @ 11:10)  SpO2: 96% (08-07-23 @ 11:10) (96% - 97%)  Wt(kg): --    PHYSICAL EXAM:  Constitutional: non-toxic, no distress poorly interactive  HEAD/EYES: anicteric, no conjunctival injection  ENT:  supple, no thrush  Cardiovascular:    S1, S2,  Respiratory:  clear BS bilaterally, no wheezes, no rales  GI:  soft, non-tender, normal bowel sounds  :  no cornejo, no CVA tenderness  Musculoskeletal:  no synovitis, normal ROM  Skin:  no rash, no erythema, no phlebitis  Heme/Onc: no lymphadenopathy   Psychiatric:  lethargic                             10.7   10.32 )-----------( 336      ( 07 Aug 2023 09:59 )             34.0   08-07    145  |  106  |  42<H>  ----------------------------<  85  3.3<L>   |  21<L>  |  1.93<H>    Ca    9.2      07 Aug 2023 09:59  Mg     2.0     08-06      Urinalysis (08.07.23 @ 21:46)    pH Urine: 5.5   Glucose Qualitative, Urine: Negative   Blood, Urine: Negative   Color: Light Yellow   Urine Appearance: Clear   Bilirubin: Negative   Ketone - Urine: Negative   Specific Gravity: 1.013   Protein, Urine: Negative   Urobilinogen: Negative   Nitrite: Negative   Leukocyte Esterase Concentration: Large  Urine Microscopic-Add On (NC) (08.07.23 @ 21:46)    Red Blood Cell - Urine: 1 /hpf   White Blood Cell - Urine: 26 /HPF   Hyaline Casts: 1 /lpf   Bacteria: Negative   Comment - Urine: many budding yeast seen   Squamous Epithelial Cells: 1 /hpf        < from: TTE W or WO Ultrasound Enhancing Agent (08.03.23 @ 12:26) >    _______________________________________________________________________________________  CONCLUSIONS:      1. The left ventricular wall thickness is normal. The left ventricular systolic function is mildly decreased with an ejectionfraction of 44 % by Marquez's method of disks. There are no regional wall motion abnormalities seen. No evidence of a thrombus in the left ventricle.   2. There is normal left ventricular diastolic function, with normal filling pressure.   3. Normal atria.   4. Normal right ventricular cavity size, normal right ventricular wall thickness and normal right ventricular systolic function. The tricuspid annular plane systolic excursion (TAPSE) is 0.9 cm (normal >=1.7 cm).   5. There is calcification of the aortic valve leaflets. There is severe aortic stenosis. There is low flow, low gradient aortic stenosis with reduced EF. The peak transaortic velocity is 1.88 m/s, peak transaortic gradient is 14.1 mmHg and mean transaortic gradient is 6.0 mmHg with an LVOT/aortic valve VTI ratio of 0.25. The aortic valve area is estimated at 0.87 cm² by the continuity equation. There is mild aortic regurgitation.   6. There is calcification of the mitral valve annulus. There is mild mitral regurgitation.   7. There is mild tricuspid regurgitation. Estimated pulmonary artery systolic pressure is 25 mmHg.   8. No pericardial effusion seen.    < end of copied text >  MICROBIOLOGY:  Culture - Urine (collected 02 Aug 2023 21:57)  Source: Clean Catch Clean Catch (Midstream)  Final Report (06 Aug 2023 09:28):    10,000 - 49,000 CFU/mL Enterococcus faecium    <10,000 CFU/ml Normal Urogenital edouard present  Organism: Enterococcus faecium (06 Aug 2023 09:28)  Organism: Enterococcus faecium (06 Aug 2023 09:28)      Method Type: ISABELLA      -  Ampicillin: R >8 Predicts results to ampicillin/sulbactam, amoxacillin-clavulanate and  piperacillin-tazobactam.      -  Ciprofloxacin: R >2      -  Levofloxacin: R >4      -  Nitrofurantoin: R >64 Should not be used to treat pyelonephritis.      -  Tetracycline: R >8      -  Vancomycin: S 0.5                  RADIOLOGY:  imaging below personally reviewed and agree with findings    < from: Xray Chest 1 View- PORTABLE-Routine (Xray Chest 1 View- PORTABLE-Routine in AM.) (08.07.23 @ 09:30) >    ACC: [id number] EXAM:  XR CHEST PORTABLE ROUTINE 1V   ORDERED BY: ZUNILDA LOVELL     PROCEDURE DATE:  08/07/2023          INTERPRETATION:  CLINICAL INDICATION: Cough.    EXAM: Frontal radiograph of the chest.    COMPARISON: Chest radiograph from 6/28/2023. CT chest 8/2/2023.    FINDINGS:  Left chest wall pacemaker.  Median sternotomy and CABG.  No focal consolidation or large effusion.  No pneumothorax.  Azygous lobe noted, normal anatomic variant.  The heart size is normal.  The visualized osseous structures demonstrate no acute pathology.    IMPRESSION:  No focal consolidation or large effusion.    < end of copied text >  < from: CT Chest No Cont (08.02.23 @ 21:23) >      INTERPRETATION:  CLINICAL INFORMATION: Cough    COMPARISON: Renal ultrasound 6/29/2023.    CONTRAST/COMPLICATIONS:  IV Contrast: NONE  Oral Contrast: NONE  Complications: None reported at time of study completion    PROCEDURE:  CT of the Chest was performed.  Sagittal and coronal reformats were performed.    FINDINGS:    LUNGS PLEURA AND AIRWAYS: Patent central airways.  Small right effusion   with associated atelectasis. Trace left effusion with associated   atelectasis.  Azygos lobe, and normal variant.  There are scattered tiny 2 mm pulmonary nodules for example in the left   upper lobe (3:42). There are no dense consolidations.  MEDIASTINUM AND ASHLYN: No lymphadenopathy.  VESSELS: Atherosclerotic changes  HEART: Sternotomy. Cardiac leads. Coronary artery and valvular   calcifications. Heart size is normal. No pericardial effusion.  CHEST WALL AND LOWER NECK: Within normal limits.  VISUALIZED UPPER ABDOMEN: Within normal limits.  BONES: Degenerative changes. Old posterior right rib fractures.    IMPRESSION:    Bilateral pleural effusions with associated atelectasis as described   above. No definite evidence for pneumonia/infection.      < end of copied text >

## 2023-08-08 LAB
ANION GAP SERPL CALC-SCNC: 15 MMOL/L — SIGNIFICANT CHANGE UP (ref 5–17)
BUN SERPL-MCNC: 42 MG/DL — HIGH (ref 7–23)
CALCIUM SERPL-MCNC: 9 MG/DL — SIGNIFICANT CHANGE UP (ref 8.4–10.5)
CHLORIDE SERPL-SCNC: 107 MMOL/L — SIGNIFICANT CHANGE UP (ref 96–108)
CO2 SERPL-SCNC: 22 MMOL/L — SIGNIFICANT CHANGE UP (ref 22–31)
CREAT SERPL-MCNC: 2.03 MG/DL — HIGH (ref 0.5–1.3)
EGFR: 30 ML/MIN/1.73M2 — LOW
GLUCOSE SERPL-MCNC: 88 MG/DL — SIGNIFICANT CHANGE UP (ref 70–99)
MAGNESIUM SERPL-MCNC: 1.9 MG/DL — SIGNIFICANT CHANGE UP (ref 1.6–2.6)
POTASSIUM SERPL-MCNC: 3.5 MMOL/L — SIGNIFICANT CHANGE UP (ref 3.5–5.3)
POTASSIUM SERPL-SCNC: 3.5 MMOL/L — SIGNIFICANT CHANGE UP (ref 3.5–5.3)
SODIUM SERPL-SCNC: 144 MMOL/L — SIGNIFICANT CHANGE UP (ref 135–145)

## 2023-08-08 PROCEDURE — 99232 SBSQ HOSP IP/OBS MODERATE 35: CPT

## 2023-08-08 RX ADMIN — LEVETIRACETAM 500 MILLIGRAM(S): 250 TABLET, FILM COATED ORAL at 05:39

## 2023-08-08 RX ADMIN — Medication 1 MILLIGRAM(S): at 11:27

## 2023-08-08 RX ADMIN — Medication 20 MILLIGRAM(S): at 05:54

## 2023-08-08 RX ADMIN — ENOXAPARIN SODIUM 30 MILLIGRAM(S): 100 INJECTION SUBCUTANEOUS at 17:42

## 2023-08-08 RX ADMIN — MUPIROCIN 1 APPLICATION(S): 20 OINTMENT TOPICAL at 17:42

## 2023-08-08 RX ADMIN — Medication 81 MILLIGRAM(S): at 11:27

## 2023-08-08 RX ADMIN — Medication 25 MILLIGRAM(S): at 17:41

## 2023-08-08 RX ADMIN — ATORVASTATIN CALCIUM 80 MILLIGRAM(S): 80 TABLET, FILM COATED ORAL at 21:24

## 2023-08-08 RX ADMIN — PANTOPRAZOLE SODIUM 40 MILLIGRAM(S): 20 TABLET, DELAYED RELEASE ORAL at 05:55

## 2023-08-08 RX ADMIN — LEVETIRACETAM 500 MILLIGRAM(S): 250 TABLET, FILM COATED ORAL at 17:41

## 2023-08-08 RX ADMIN — Medication 25 MILLIGRAM(S): at 05:39

## 2023-08-08 RX ADMIN — CHLORHEXIDINE GLUCONATE 1 APPLICATION(S): 213 SOLUTION TOPICAL at 05:54

## 2023-08-08 RX ADMIN — MIRTAZAPINE 15 MILLIGRAM(S): 45 TABLET, ORALLY DISINTEGRATING ORAL at 11:27

## 2023-08-08 NOTE — PROGRESS NOTE ADULT - ASSESSMENT
89M w/ hx of CAD s/p CABG, s/p PPM, CHF, seizures, CKD, anemia, HTN, recent UTI p/w decreased PO and weakness. Pt has been very weak and almost bed bound for the past month. Pt has hx of UTIs requiring admission in the past. Around June 4th daughter became concerned over change in pt's mood and awareness. Called PMD on phone and was prescribed 1 week course of Levaquin. Pt did not seem to improve significantly and pt went to see urologist around 6/14. Reportedly urine was checked and pt was switched to Bactrim. Pt otherwise compliant with meds as they are provided by [facility].  (02 Aug 2023 20:38)      CHRONIC KIDNEY DISEASE, STAGE 3: decrease furosemide   Injectable 20 milliGRAM(s) IV Push a day    Serum creatinine is increasing , approximating a GFR is decreased   ml/min.   There is no progression.  No uremic symptoms. No evidence of  worsening  Anemia. Fluid status stable.   Will continue to avoid nephrotoxic drugs.  Patient remains asymptomatic.  Continue current therapy.      ANEMIA PLAN:  Anemia of chronic disease:  We will consider epo  aiming for a HCT of 32-36 %.   We will monitor Iron stores, B12 and RBC folate .    fluid overload furosemide   Injectable 20 milliGRAM(s) IV Push  a day    BP monitoring,continue current antihypertensive meds, low salt diet,followup with PMD in 1-2 weeks  metoprolol tartrate 25 milliGRAM(s) Oral two times a day    dvt enoxaparin Injectable 30 milliGRAM(s) SubCutaneous every 24 hours

## 2023-08-08 NOTE — PROGRESS NOTE ADULT - SUBJECTIVE AND OBJECTIVE BOX
Date of Service: 08-08-23 @ 08:46           CARDIOLOGY     PROGRESS  NOTE   ________________________________________________    CHIEF COMPLAINT:Patient is a 89y old  Male who presents with a chief complaint of FTT (07 Aug 2023 17:20)  no complain  	  REVIEW OF SYSTEMS:  CONSTITUTIONAL: No fever, weight loss, or fatigue  EYES: No eye pain, visual disturbances, or discharge  ENT:  No difficulty hearing, tinnitus, vertigo; No sinus or throat pain  NECK: No pain or stiffness  RESPIRATORY: No cough, wheezing, chills or hemoptysis; no  Shortness of Breath  CARDIOVASCULAR: No chest pain, palpitations, passing out, dizziness, or leg swelling  GASTROINTESTINAL: No abdominal or epigastric pain. No nausea, vomiting, or hematemesis; No diarrhea or constipation. No melena or hematochezia.  GENITOURINARY: No dysuria, frequency, hematuria, or incontinence  NEUROLOGICAL: No headaches, memory loss, loss of strength, numbness, or tremors  SKIN: No itching, burning, rashes, or lesions   LYMPH Nodes: No enlarged glands  ENDOCRINE: No heat or cold intolerance; No hair loss  MUSCULOSKELETAL: No joint pain or swelling; No muscle, back, or extremity pain  PSYCHIATRIC: No depression, anxiety, mood swings, or difficulty sleeping  HEME/LYMPH: No easy bruising, or bleeding gums  ALLERGY AND IMMUNOLOGIC: No hives or eczema	    [ x] All others negative	  [ ] Unable to obtain    PHYSICAL EXAM:  T(C): 36.6 (08-08-23 @ 04:09), Max: 37.1 (08-07-23 @ 21:07)  HR: 63 (08-08-23 @ 04:09) (57 - 63)  BP: 118/71 (08-08-23 @ 04:09) (97/63 - 123/75)  RR: 18 (08-08-23 @ 04:09) (18 - 18)  SpO2: 98% (08-07-23 @ 21:07) (96% - 98%)  Wt(kg): --  I&O's Summary    07 Aug 2023 07:01  -  08 Aug 2023 07:00  --------------------------------------------------------  IN: 200 mL / OUT: 0 mL / NET: 200 mL        Appearance: Normal	  HEENT:   Normal oral mucosa, PERRL, EOMI	  Lymphatic: No lymphadenopathy  Cardiovascular: Normal S1 S2, No JVD, + murmurs, No edema  Respiratory: Lungs clear to auscultation	  Psychiatry: dementia  Gastrointestinal:  Soft, Non-tender, + BS	  Skin: No rashes, No ecchymoses, No cyanosis	  Neurologic: Non-focal  Extremities: Normal range of motion, No clubbing, cyanosis or edema  Vascular: Peripheral pulses palpable 2+ bilaterally    MEDICATIONS  (STANDING):  aspirin enteric coated 81 milliGRAM(s) Oral daily  atorvastatin 80 milliGRAM(s) Oral at bedtime  chlorhexidine 2% Cloths 1 Application(s) Topical <User Schedule>  enoxaparin Injectable 30 milliGRAM(s) SubCutaneous every 24 hours  folic acid 1 milliGRAM(s) Oral daily  furosemide   Injectable 20 milliGRAM(s) IV Push daily  levETIRAcetam 500 milliGRAM(s) Oral two times a day  metoprolol tartrate 25 milliGRAM(s) Oral two times a day  mirtazapine 15 milliGRAM(s) Oral daily  mupirocin 2% Nasal 1 Application(s) Both Nostrils two times a day  pantoprazole    Tablet 40 milliGRAM(s) Oral before breakfast      TELEMETRY: 	    ECG:  	  RADIOLOGY:  OTHER: 	  	  LABS:	 	    CARDIAC MARKERS:                                10.7   10.32 )-----------( 336      ( 07 Aug 2023 09:59 )             34.0     08-07    145  |  106  |  42<H>  ----------------------------<  85  3.3<L>   |  21<L>  |  1.93<H>    Ca    9.2      07 Aug 2023 09:59      proBNP:   Lipid Profile: Cholesterol 88  LDL --  HDL 31  TG 81    HgA1c:   TSH: Thyroid Stimulating Hormone, Serum: 2.30 uIU/mL (08-04 @ 06:14)  < from: Xray Chest 1 View- PORTABLE-Routine (Xray Chest 1 View- PORTABLE-Routine in AM.) (08.07.23 @ 09:30) >  FINDINGS:  Left chest wall pacemaker.  Median sternotomy and CABG.  No focal consolidation or large effusion.  No pneumothorax.  Azygous lobe noted, normal anatomic variant.  The heart size is normal.  The visualized osseous structures demonstrate no acute pathology.    IMPRESSION:  No focal consolidation or large effusion.        Assessment and plan  ---------------------------  89M w/ hx of CAD s/p CABG, s/p PPM, CHF, seizures, CKD, anemia, HTN, recent UTI p/w decreased PO and weakness. Pt has been very weak and almost bed bound for the past month. Pt has hx of UTIs requiring admission in the past. Around June 4th daughter became concerned over change in pt's mood and awareness. Called PMD on phone and was prescribed 1 week course of Levaquin. Pt did not seem to improve significantly and pt went to see urologist around 6/14. Reportedly urine was checked and pt was switched to Bactrim. Pt otherwise compliant with meds as they are provided by [facility].    chf ?hfpef  check TTE  continue current meds  Lasix 20 mg iv bid  ct chest no contrast noted with bl pleural effusion  will adjust cardiac meds  tele  cardiac enzyme  anemia, check guaiac check cbc  Protonix  abx for UTI  awaiting blood work  echo noted with SEVERE AS as the cause of chf and hx of CAD, need to discuss with family and health care proxy  continue gentle diuresis  Avoid hypotension/ ACRE or ARB  will repeat chest x ray today  not a surgical candidate, spoke to daughter yesterday  chest x ray noted much improvement, agree to dc on Lasix 20 mg daily and treat symptomaticaly

## 2023-08-08 NOTE — PROGRESS NOTE ADULT - ASSESSMENT
89M w/ hx of CAD s/p CABG, s/p PPM, CHF, seizures, CKD, anemia, HTN, SEVERE AS, recent UTI p/w decreased PO and weakness.  family is considering GOC. Currently no IV access.  Pt with progressive lethargy, confusion over last couple of months     # Recurrent UTI/ uncomplicated  - multiple recent treatment course; continues to decline clinically  - UCx now with E. faecium; sensitive to Vancomycin  - Linezolid might be an oral option although not ideal one, would need to check sensitivities   - Would need IV vancomycin; if were to treat his urine cultures  - uncleat that treating possible UTI would make significant benefit in his overall clinical course; he remains without symptoms  check PVR ( post void residual)  - Would recheck UA and Ucx  Chelsea Young M.D. ,   please reach via teams   If no answer, or after 5PM/ weekends,  then please call  [phone]    Assessment and plan discussed with the primary team .       I will be away as of tomorrow. My associates will be available for any issues  Please call [phone] with any  acute issues, questions.

## 2023-08-08 NOTE — PROGRESS NOTE ADULT - SUBJECTIVE AND OBJECTIVE BOX
Patient is a 89y old  Male who presents with a chief complaint of FTT (08 Aug 2023 08:46)    Being followed by ID for        Interval history:  No other acute events      ROS:  No cough,SOB,CP  No N/V/D  No abd pain  No urinary complaints  No HA  No joint or limb pain  No other complaints    PAST MEDICAL & SURGICAL HISTORY:  CHF, chronic      CAD (coronary artery disease)      Essential hypertension      FH: CABG (coronary artery bypass surgery)        Allergies    No Known Allergies    Intolerances      Antimicrobials:      MEDICATIONS  (STANDING):  aspirin enteric coated 81 milliGRAM(s) Oral daily  atorvastatin 80 milliGRAM(s) Oral at bedtime  chlorhexidine 2% Cloths 1 Application(s) Topical <User Schedule>  enoxaparin Injectable 30 milliGRAM(s) SubCutaneous every 24 hours  folic acid 1 milliGRAM(s) Oral daily  furosemide   Injectable 20 milliGRAM(s) IV Push daily  levETIRAcetam 500 milliGRAM(s) Oral two times a day  metoprolol tartrate 25 milliGRAM(s) Oral two times a day  mirtazapine 15 milliGRAM(s) Oral daily  mupirocin 2% Nasal 1 Application(s) Both Nostrils two times a day  pantoprazole    Tablet 40 milliGRAM(s) Oral before breakfast      Vital Signs Last 24 Hrs  T(C): 36.4 (08-08-23 @ 10:52), Max: 37.1 (08-07-23 @ 21:07)  T(F): 97.5 (08-08-23 @ 10:52), Max: 98.8 (08-07-23 @ 21:07)  HR: 60 (08-08-23 @ 15:09) (60 - 63)  BP: 103/66 (08-08-23 @ 15:09) (97/63 - 118/71)  BP(mean): --  RR: 18 (08-08-23 @ 15:09) (18 - 18)  SpO2: 99% (08-08-23 @ 15:09) (96% - 99%)    Physical Exam:    Constitutional well preserved,comfortable,pleasant    HEENT PERRLA EOMI,No pallor or icterus    No oral exudate or erythema    Neck supple no JVD or LN    Chest Good AE,CTA    CVS RRR S1 S2 WNl No murmur or rub or gallop    Abd soft BS normal No tenderness no masses    Ext No cyanosis clubbing or edema    IV site no erythema tenderness or discharge    Joints no swelling or LOM    CNS AAO X 3 no focal    Lab Data:                          10.7   10.32 )-----------( 336      ( 07 Aug 2023 09:59 )             34.0       08-08    144  |  107  |  42<H>  ----------------------------<  88  3.5   |  22  |  2.03<H>    Ca    9.0      08 Aug 2023 12:12  Mg     1.9     08-08        Urinalysis Basic - ( 08 Aug 2023 12:12 )    Color: x / Appearance: x / SG: x / pH: x  Gluc: 88 mg/dL / Ketone: x  / Bili: x / Urobili: x   Blood: x / Protein: x / Nitrite: x   Leuk Esterase: x / RBC: x / WBC x   Sq Epi: x / Non Sq Epi: x / Bacteria: x        Clean Catch Clean Catch (Midstream)  08-02-23   10,000 - 49,000 CFU/mL Enterococcus faecium  <10,000 CFU/ml Normal Urogenital edouard present  --  Enterococcus faecium                    WBC Count: 10.32 (08-07-23 @ 09:59)  WBC Count: 11.68 (08-06-23 @ 06:33)  WBC Count: 8.04 (08-05-23 @ 07:13)  WBC Count: 7.23 (08-02-23 @ 17:57)              Patient is a 89y old  Male who presents with a chief complaint of FTT (08 Aug 2023 08:46)    Being followed by ID for positive urine culture        Interval history:  pt more alert   pt eating more today  more animated  adamantly denies urinary sxs  No other acute events        PAST MEDICAL & SURGICAL HISTORY:  CHF, chronic      CAD (coronary artery disease)      Essential hypertension      FH: CABG (coronary artery bypass surgery)        Allergies    No Known Allergies    Intolerances      Antimicrobials:      MEDICATIONS  (STANDING):  aspirin enteric coated 81 milliGRAM(s) Oral daily  atorvastatin 80 milliGRAM(s) Oral at bedtime  chlorhexidine 2% Cloths 1 Application(s) Topical <User Schedule>  enoxaparin Injectable 30 milliGRAM(s) SubCutaneous every 24 hours  folic acid 1 milliGRAM(s) Oral daily  furosemide   Injectable 20 milliGRAM(s) IV Push daily  levETIRAcetam 500 milliGRAM(s) Oral two times a day  metoprolol tartrate 25 milliGRAM(s) Oral two times a day  mirtazapine 15 milliGRAM(s) Oral daily  mupirocin 2% Nasal 1 Application(s) Both Nostrils two times a day  pantoprazole    Tablet 40 milliGRAM(s) Oral before breakfast      Vital Signs Last 24 Hrs  T(C): 36.4 (08-08-23 @ 10:52), Max: 37.1 (08-07-23 @ 21:07)  T(F): 97.5 (08-08-23 @ 10:52), Max: 98.8 (08-07-23 @ 21:07)  HR: 60 (08-08-23 @ 15:09) (60 - 63)  BP: 103/66 (08-08-23 @ 15:09) (97/63 - 118/71)  BP(mean): --  RR: 18 (08-08-23 @ 15:09) (18 - 18)  SpO2: 99% (08-08-23 @ 15:09) (96% - 99%)    Physical Exam:    Constitutional pleasant    HEENT PERRLA EOMI,No pallor or icterus    No oral exudate or erythema    Neck supple no JVD or LN    Chest Good AE,CTA    CVS  S1 S2     Abd soft BS normal No tenderness    Ext No cyanosis clubbing or edema    IV site no erythema tenderness or discharge    Joints no swelling or LOM        Lab Data:                          10.7   10.32 )-----------( 336      ( 07 Aug 2023 09:59 )             34.0       08-08    144  |  107  |  42<H>  ----------------------------<  88  3.5   |  22  |  2.03<H>    Ca    9.0      08 Aug 2023 12:12  Mg     1.9     08-08        Urinalysis (08.07.23 @ 21:46)    Glucose Qualitative, Urine: Negative   Blood, Urine: Negative   pH Urine: 5.5   Color: Light Yellow   Urine Appearance: Clear   Bilirubin: Negative   Ketone - Urine: Negative   Specific Gravity: 1.013   Protein, Urine: Negative   Urobilinogen: Negative   Nitrite: Negative   Leukocyte Esterase Concentration: Large  Urine Microscopic-Add On (NC) (08.07.23 @ 21:46)    Red Blood Cell - Urine: 1 /hpf   Hyaline Casts: 1 /lpf   White Blood Cell - Urine: 26 /HPF   Bacteria: Negative   Comment - Urine: many budding yeast seen   Squamous Epithelial Cells: 1 /hpf        Clean Catch Clean Catch (Midstream)  08-02-23   10,000 - 49,000 CFU/mL Enterococcus faecium  <10,000 CFU/ml Normal Urogenital edouard present  --  Enterococcus faecium        WBC Count: 10.32 (08-07-23 @ 09:59)  WBC Count: 11.68 (08-06-23 @ 06:33)  WBC Count: 8.04 (08-05-23 @ 07:13)  WBC Count: 7.23 (08-02-23 @ 17:57)

## 2023-08-08 NOTE — PROGRESS NOTE ADULT - SUBJECTIVE AND OBJECTIVE BOX
Patient is a 89y Male whom presented to the hospital with ckd stage3     PAST MEDICAL & SURGICAL HISTORY:  CHF, chronic      CAD (coronary artery disease)      Essential hypertension      FH: CABG (coronary artery bypass surgery)          MEDICATIONS  (STANDING):  aspirin enteric coated 81 milliGRAM(s) Oral daily  atorvastatin 80 milliGRAM(s) Oral at bedtime  enoxaparin Injectable 40 milliGRAM(s) SubCutaneous every 24 hours  folic acid 1 milliGRAM(s) Oral daily  furosemide   Injectable 20 milliGRAM(s) IV Push two times a day  levETIRAcetam 500 milliGRAM(s) Oral two times a day  metoprolol tartrate 25 milliGRAM(s) Oral two times a day  mirtazapine 15 milliGRAM(s) Oral daily  pantoprazole    Tablet 40 milliGRAM(s) Oral before breakfast      Allergies    No Known Allergies    Intolerances        SOCIAL HISTORY:  Denies ETOh,Smoking,     FAMILY HISTORY:      REVIEW OF SYSTEMS:    CONSTITUTIONAL: No weakness, fevers or chills  RESPIRATORY: No cough, wheezing, hemoptysis; No shortness of breath                          10.7   10.32 )-----------( 336      ( 07 Aug 2023 09:59 )             34.0       CBC Full  -  ( 07 Aug 2023 09:59 )  WBC Count : 10.32 K/uL  RBC Count : 3.71 M/uL  Hemoglobin : 10.7 g/dL  Hematocrit : 34.0 %  Platelet Count - Automated : 336 K/uL  Mean Cell Volume : 91.6 fl  Mean Cell Hemoglobin : 28.8 pg  Mean Cell Hemoglobin Concentration : 31.5 gm/dL  Auto Neutrophil # : 7.42 K/uL  Auto Lymphocyte # : 1.51 K/uL  Auto Monocyte # : 0.90 K/uL  Auto Eosinophil # : 0.37 K/uL  Auto Basophil # : 0.06 K/uL  Auto Neutrophil % : 71.9 %  Auto Lymphocyte % : 14.6 %  Auto Monocyte % : 8.7 %  Auto Eosinophil % : 3.6 %  Auto Basophil % : 0.6 %      08-08    144  |  107  |  42<H>  ----------------------------<  88  3.5   |  22  |  2.03<H>    Ca    9.0      08 Aug 2023 12:12  Mg     1.9     08-08        CAPILLARY BLOOD GLUCOSE          Vital Signs Last 24 Hrs  T(C): 36.7 (08 Aug 2023 20:30), Max: 37.1 (07 Aug 2023 21:07)  T(F): 98.1 (08 Aug 2023 20:30), Max: 98.8 (07 Aug 2023 21:07)  HR: 60 (08 Aug 2023 20:30) (60 - 63)  BP: 94/54 (08 Aug 2023 20:30) (94/54 - 118/71)  BP(mean): --  RR: 18 (08 Aug 2023 20:30) (18 - 18)  SpO2: 96% (08 Aug 2023 20:30) (96% - 99%)    Parameters below as of 08 Aug 2023 20:30  Patient On (Oxygen Delivery Method): room air        Urinalysis Basic - ( 08 Aug 2023 12:12 )    Color: x / Appearance: x / SG: x / pH: x  Gluc: 88 mg/dL / Ketone: x  / Bili: x / Urobili: x   Blood: x / Protein: x / Nitrite: x   Leuk Esterase: x / RBC: x / WBC x   Sq Epi: x / Non Sq Epi: x / Bacteria: x            PHYSICAL EXAM:    Constitutional: NAD  HEENT: conjunctive   clear   Neck:  No JVD  Respiratory: decrease bs b/l   Cardiovascular: S1 and S2  Gastrointestinal: BS+, soft, NT/ND  Extremities: No peripheral edema

## 2023-08-08 NOTE — PROGRESS NOTE ADULT - ASSESSMENT
89M w/ hx of CAD s/p CABG, s/p PPM, CHF, seizures, CKD, anemia, HTN, recent UTI p/w decreased PO and weakness. Pt has been very weak and almost bed bound for the past month. Pt has hx of UTIs requiring admission in the past. Around June 4th daughter became concerned over change in pt's mood and awareness. Called PMD on phone and was prescribed 1 week course of Levaquin. Pt did not seem to improve significantly and pt went to see urologist around 6/14. Reportedly urine was checked and pt was switched to Bactrim. Pt otherwise compliant with meds as they are provided by [facility].   He was transferred to ER due to FTT for over last month. He was tried and given supportive IVF and Mirtazapine but not improved. He was found with UTI with ESBL and was treated with IV INVANZ for a few days. He started having cough for  afew days, COVID PCR was negative and CXR did not show any infiltration,     palliative   family still thinking about hospice at home, waiting to hear from family for final decision.    FTT   Continue supportive IV fluid, continue mirtazapine,  discuss with the daughter about palliative, awaiting decision,  Discussed with the family starting him on mirtazapine and encourage PO intake, f/u with the dietitian, and monitor weight. Check TSH prealbumin CBC and CMP.   Patient does not wish for any peg tube or TF.  Mildly better, started eating  today. Encourage PO intake. Continue Mirtazapine. F/u dietitian     Pulmonary edema  b/l pleural effusion. Resolved. Switched IV Lasix to PO. Lasix daily. 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 Patient not candidate for surgery. Consult with palliative recommended comfort care and hospice at home.     recurring UTI:  new UC positive with Enterococcus. Unsure if real UTI. F/u ID who recommended  to recheck UA U C&S.     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     Urinary retention  resolved off meds.    Incidental Lung Nodule   Given tobacco use hx consider repeat imaging as outpatient with PCP    CKD3   Avoid nephrotoxins, renally dose meds, Trend creatinin      DVT ppx   Lovenox 30 mg due to Crcl below 30    HTN:  Taper metoprolol tartrate due to HTN and bradycardia 12.5 MG BID     Hx of SH  According is daughter monitor      Mild PVD:  Continue Aspirin and atorvastatin.      DVT ppx   Lovenox 40 mg SC daily

## 2023-08-08 NOTE — CHART NOTE - NSCHARTNOTEFT_GEN_A_CORE
Nutrition Follow Up Note  Patient seen for: consult for CHF education and 1st Malnutrition follow up     Chart reviewed, events noted. Per chart, "89M w/ hx of CAD s/p CABG, s/p PPM, CHF, seizures, CKD, anemia, HTN, recent UTI p/w decreased PO and weakness. Pt has been very weak and almost bed bound for the past month. Pt has hx of UTIs requiring admission in the past. Around  daughter became concerned over change in pt's mood and awareness."    Source: [x] Patient       [x] Medical Record        [] RN        [] Family at bedside       [] Other:    -If unable to interview patient: [] Trach/Vent/BiPAP  [] Disoriented/confused/inappropriate to interview    Diet Order:   Diet, Regular (23)    - Is current order appropriate/adequate? [] Yes  []  No: see recommendations below     - PO intake :   [] >75%  Adequate    [] 50-75%  Fair       [x] <50%  Poor    - Nutrition-related concerns:      - Intake: Pt reports poor appetite/PO intake, reports consuming <50% of meals x >/5 days. Pt is currently ordered for mighty shakes TID. Pt is amenable to receiving Ensure Shake 2x/day (700 marjorie, 40 g protein) to help optimize PO intake.       - GI: No GI distress reported. Last BM: noted with fecal incontinence.   Bowel Regimen? [] Yes   [x] No      - Ordered for Remeron (off brand appetite stimulant).       - Micronutrient/Other supplementation: folic acid     Weights:   Dosing weight: 77.1 kg ()  Daily Weight in k (), Weight in k (), Weight in k.2 (), Weight in k.2 ()  ** Weight fluctuating - Weight changes likely secondary to fluid shifts. Of note, Pt being diuresed in house. RD to continue to monitor weight trends as able.     Nutritionally Pertinent MEDICATIONS  (STANDING):  atorvastatin  folic acid  furosemide   Injectable  metoprolol tartrate  pantoprazole    Tablet    Pertinent Labs:  @ 12:12: Na 144, BUN 42<H>, Cr 2.03<H>, BG 88, K+ 3.5, Phos --, Mg 1.9, Alk Phos --, ALT/SGPT --, AST/SGOT --, HbA1c --    A1C with Estimated Average Glucose Result: See Note (23 @ 06:14)    Finger Sticks:      Skin per nursing documentation: No pressure injuries noted.  Edema per nursing documentation: 1+ L/R ankle     Estimated Needs:   [x] no change since previous assessment  [] recalculated:     Previous Nutrition Diagnosis: severe chronic malnutrition   Nutrition Diagnosis is: [x] ongoing  [] resolved [] not applicable     Nutrition Care Plan:  [x] In Progress  [] Achieved  [] Not applicable    New Nutrition Diagnosis: Food and Nutrition related knowledge deficit related to limited prior nutrition- related education as evidenced by Pt with CHF.   Goal: Pt to be able to teach back 2 points during follow up.     Nutrition Interventions:     Education Provided   [x] Yes:  [] No: Emphasized the importance of adequate kcal and protein intake, provided recommendations to optimize nutritional intake in case of decreased appetite, recommended small frequent meals by consuming nutrient-dense snacks between meals, to start with protein, and sips of supplement throughout the day.   Reviewed CHF diet education. Discussed Na restriction, foods high in Na to avoid, reading food labels, tips for limiting Na in your diet. Reviewed daily weights, Wt gain parameters to contact MD. Discussed Na intake in relation to fluid retention, edema and Wt gain. Pt verbalized understanding and accepted Heart Failure Nutrition Therapy Handout.  RD remains available for diet education review.     Recommendations:      [X] Continue current diet order: regular diet       [X] Continue mighty shakes TID.       [X] Recommend Ensure Shake 2x/day (700 marjorie, 40 g protein) to help optimize PO intake.   [X] Encourage adequate intake of meals and supplements to optimize PO intake.   [X] Continue Micronutrient/Other supplementation: folic acid   [X] Continue Remeron as per team.   [X] Reinforce diet education as needed.      Monitoring and Evaluation:   Continue to monitor nutritional intake, tolerance to diet prescription, weights, labs, skin integrity    RD remains available upon request and will follow up per protocol  Joanna Toribio RD CDN pager # [phone] or TEAMS

## 2023-08-08 NOTE — PROGRESS NOTE ADULT - SUBJECTIVE AND OBJECTIVE BOX
Patient is a 89y old  Male who presents with a chief complaint of FTT (08 Aug 2023 20:37)      INTERVAL HPI/OVERNIGHT EVENTS: Patient still waiting for home hospice. He is eating a little bit better. Appetite improved. for ? UTI ID recommended to recheck UA U C&S and hold off on abx therapy not sure if real UTI.     Pain Location & Control:     MEDICATIONS  (STANDING):  aspirin enteric coated 81 milliGRAM(s) Oral daily  atorvastatin 80 milliGRAM(s) Oral at bedtime  chlorhexidine 2% Cloths 1 Application(s) Topical <User Schedule>  enoxaparin Injectable 30 milliGRAM(s) SubCutaneous every 24 hours  folic acid 1 milliGRAM(s) Oral daily  furosemide   Injectable 20 milliGRAM(s) IV Push daily  levETIRAcetam 500 milliGRAM(s) Oral two times a day  metoprolol tartrate 25 milliGRAM(s) Oral two times a day  mirtazapine 15 milliGRAM(s) Oral daily  mupirocin 2% Nasal 1 Application(s) Both Nostrils two times a day  pantoprazole    Tablet 40 milliGRAM(s) Oral before breakfast    MEDICATIONS  (PRN):      Allergies    No Known Allergies    Intolerances        REVIEW OF SYSTEMS:  CONSTITUTIONAL: No fever, weight loss, or fatigue  EYES: No eye pain, visual disturbances, or discharge  ENMT:  No difficulty hearing, tinnitus, vertigo; No sinus or throat pain  NECK: No pain or stiffness  BREASTS: No pain, masses, or nipple discharge  RESPIRATORY: No cough, wheezing, chills or hemoptysis; No shortness of breath  CARDIOVASCULAR: No chest pain, palpitations, dizziness, or leg swelling  GASTROINTESTINAL: No abdominal or epigastric pain. No nausea, vomiting, or hematemesis; No diarrhea or constipation. No melena or hematochezia.  GENITOURINARY: No dysuria, frequency, hematuria, or incontinence  NEUROLOGICAL: No headaches, memory loss, loss of strength, numbness, or tremors  SKIN: No itching, burning, rashes, or lesions   LYMPH NODES: No enlarged glands  ENDOCRINE: No heat or cold intolerance; No hair loss; No polydipsia or polyuria  MUSCULOSKELETAL: No back pain  PSYCHIATRIC: No depression, anxiety, mood swings, or difficulty sleeping  HEME/LYMPH: No easy bruising, or bleeding gums  ALLERGY AND IMMUNOLOGIC: No hives or eczema    Vital Signs Last 24 Hrs  T(C): 36.7 (08 Aug 2023 20:30), Max: 36.7 (08 Aug 2023 20:30)  T(F): 98.1 (08 Aug 2023 20:30), Max: 98.1 (08 Aug 2023 20:30)  HR: 60 (08 Aug 2023 20:30) (60 - 63)  BP: 94/54 (08 Aug 2023 20:30) (94/54 - 118/71)  BP(mean): --  RR: 18 (08 Aug 2023 20:30) (18 - 18)  SpO2: 96% (08 Aug 2023 20:30) (96% - 99%)    Parameters below as of 08 Aug 2023 20:30  Patient On (Oxygen Delivery Method): room air        PHYSICAL EXAM:  GENERAL: NAD, well-groomed, well-developed  HEAD:  Atraumatic, Normocephalic  EYES: EOMI, PERRLA, conjunctiva and sclera clear  ENMT: No tonsillar erythema, exudates, or enlargement; Moist mucous membranes, Good dentition, No lesions  NECK: Supple, No JVD, Normal thyroid  NERVOUS SYSTEM:  Alert & Oriented X 1 mild dementia  CHEST/LUNG: Clear to auscultation bilaterally; No rales, rhonchi, wheezing, or rubs  HEART: Regular rate and rhythm; No murmurs, rubs, or gallops  ABDOMEN: Soft, Nontender, Nondistended; Bowel sounds present  EXTREMITIES:  2+ Peripheral Pulses, No clubbing or cyanosis  LYMPH: No lymphadenopathy noted  SKIN: No rashes or lesions      LABS:    08 Aug 2023 12:12    144    |  107    |  42     ----------------------------<  88     3.5     |  22     |  2.03     Ca    9.0        08 Aug 2023 12:12  Mg     1.9       08 Aug 2023 12:12        Urinalysis Basic - ( 08 Aug 2023 12:12 )    Color: x / Appearance: x / SG: x / pH: x  Gluc: 88 mg/dL / Ketone: x  / Bili: x / Urobili: x   Blood: x / Protein: x / Nitrite: x   Leuk Esterase: x / RBC: x / WBC x   Sq Epi: x / Non Sq Epi: x / Bacteria: x      CAPILLARY BLOOD GLUCOSE            Cultures  Culture Results:   10,000 - 49,000 CFU/mL Enterococcus faecium  <10,000 CFU/ml Normal Urogenital edouard present (08-02-23 @ 21:57)      RADIOLOGY & ADDITIONAL TESTS:    Imaging Personally Reviewed:  [X ] YES  [ ] NO    Consultant(s) Notes Reviewed:  [ X] YES  [ ] NO    Care Discussed with Consultants/Other Providers [X ] YES  [ ] NO meropenem  IVPB 1000 milliGRAM(s) IV Intermittent every 8 hours  Midodrine 0.5mg via PEG Q 8 hrs   Ongoing critical care monitoring

## 2023-08-08 NOTE — CHART NOTE - NSCHARTNOTEFT_GEN_A_CORE
RD CHF education chart note    Patient was visited by RD for CHF education. Heart failure education provided to the patient in detail. Discussed heart failure nutrition therapy, sodium and fluid intake, importance of diet adherence, daily weights monitoring with the patient. Reinforced importance of weight gain parameters and importance of contacting MD’s about weight changes. Provided handouts on heart failure nutrition therapy, reading heart healthy nutrition labels, heart healthy shopping tips and low sodium food list. Patient verbalized understanding demonstrated by teach back method. RD contact information left with patient for any future questioning.    Joanna Toribio RD CDN pager # [phone] or TEAMS

## 2023-08-09 LAB
ANION GAP SERPL CALC-SCNC: 17 MMOL/L — SIGNIFICANT CHANGE UP (ref 5–17)
BUN SERPL-MCNC: 42 MG/DL — HIGH (ref 7–23)
CALCIUM SERPL-MCNC: 9.6 MG/DL — SIGNIFICANT CHANGE UP (ref 8.4–10.5)
CHLORIDE SERPL-SCNC: 103 MMOL/L — SIGNIFICANT CHANGE UP (ref 96–108)
CO2 SERPL-SCNC: 23 MMOL/L — SIGNIFICANT CHANGE UP (ref 22–31)
CREAT SERPL-MCNC: 2.23 MG/DL — HIGH (ref 0.5–1.3)
CULTURE RESULTS: SIGNIFICANT CHANGE UP
EGFR: 27 ML/MIN/1.73M2 — LOW
GLUCOSE SERPL-MCNC: 102 MG/DL — HIGH (ref 70–99)
POTASSIUM SERPL-MCNC: 3.4 MMOL/L — LOW (ref 3.5–5.3)
POTASSIUM SERPL-SCNC: 3.4 MMOL/L — LOW (ref 3.5–5.3)
SODIUM SERPL-SCNC: 143 MMOL/L — SIGNIFICANT CHANGE UP (ref 135–145)
SPECIMEN SOURCE: SIGNIFICANT CHANGE UP

## 2023-08-09 RX ORDER — POTASSIUM CHLORIDE 20 MEQ
40 PACKET (EA) ORAL ONCE
Refills: 0 | Status: COMPLETED | OUTPATIENT
Start: 2023-08-09 | End: 2023-08-09

## 2023-08-09 RX ADMIN — Medication 40 MILLIEQUIVALENT(S): at 09:59

## 2023-08-09 RX ADMIN — PANTOPRAZOLE SODIUM 40 MILLIGRAM(S): 20 TABLET, DELAYED RELEASE ORAL at 06:00

## 2023-08-09 RX ADMIN — MIRTAZAPINE 15 MILLIGRAM(S): 45 TABLET, ORALLY DISINTEGRATING ORAL at 11:03

## 2023-08-09 RX ADMIN — MUPIROCIN 1 APPLICATION(S): 20 OINTMENT TOPICAL at 05:44

## 2023-08-09 RX ADMIN — LEVETIRACETAM 500 MILLIGRAM(S): 250 TABLET, FILM COATED ORAL at 05:44

## 2023-08-09 RX ADMIN — LEVETIRACETAM 500 MILLIGRAM(S): 250 TABLET, FILM COATED ORAL at 17:03

## 2023-08-09 RX ADMIN — Medication 20 MILLIGRAM(S): at 05:44

## 2023-08-09 RX ADMIN — Medication 1 MILLIGRAM(S): at 11:03

## 2023-08-09 RX ADMIN — Medication 25 MILLIGRAM(S): at 17:03

## 2023-08-09 RX ADMIN — MUPIROCIN 1 APPLICATION(S): 20 OINTMENT TOPICAL at 17:03

## 2023-08-09 RX ADMIN — Medication 81 MILLIGRAM(S): at 11:03

## 2023-08-09 RX ADMIN — Medication 25 MILLIGRAM(S): at 05:44

## 2023-08-09 RX ADMIN — ENOXAPARIN SODIUM 30 MILLIGRAM(S): 100 INJECTION SUBCUTANEOUS at 17:45

## 2023-08-09 RX ADMIN — CHLORHEXIDINE GLUCONATE 1 APPLICATION(S): 213 SOLUTION TOPICAL at 05:45

## 2023-08-09 NOTE — PROGRESS NOTE ADULT - ASSESSMENT
89M w/ hx of CAD s/p CABG, s/p PPM, CHF, seizures, CKD, anemia, HTN, recent UTI p/w decreased PO and weakness. Pt has been very weak and almost bed bound for the past month. Pt has hx of UTIs requiring admission in the past. Around June 4th daughter became concerned over change in pt's mood and awareness. Called PMD on phone and was prescribed 1 week course of Levaquin. Pt did not seem to improve significantly and pt went to see urologist around 6/14. Reportedly urine was checked and pt was switched to Bactrim. Pt otherwise compliant with meds as they are provided by [facility].   He was transferred to ER due to FTT for over last month. He was tried and given supportive IVF and Mirtazapine but not improved. He was found with UTI with ESBL and was treated with IV INVANZ for a few days. He started having cough for  afew days, COVID PCR was negative and CXR did not show any infiltration,     palliative   family still thinking about hospice at home, waiting to hear from family for final decision for home hospice vs go back to [facility] and then home hospice after      FTT   Continue supportive IV fluid, continue mirtazapine,  discuss with the daughter about palliative, awaiting decision,  Discussed with the family starting him on mirtazapine and encourage PO intake, f/u with the dietitian, and monitor weight. Check TSH prealbumin CBC and CMP.   Patient does not wish for any peg tube or TF.  Improved. Encourage PO intake, eating  better . Continue Mirtazapine. F/u dietitian     Pulmonary edema  b/l pleural effusion. Resolved. Switched IV Lasix to PO. Lasix daily. 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 Patient not candidate for surgery. Consult with palliative recommended comfort care and hospice at home.     recurring UTI:  new UC positive with Enterococcus. Unclear and asymptomatic. Most likely does not need abx therapy. F/u ID. F/u repeat UA U C&S.     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     Urinary retention  resolved off meds.    Incidental Lung Nodule   Given tobacco use hx consider repeat imaging as outpatient with PCP    CKD3   Avoid nephrotoxins, renally dose meds, Trend creatinin      DVT ppx   Lovenox 30 mg due to Crcl below 30    HTN:  Taper metoprolol tartrate due to HTN and bradycardia 12.5 MG BID     Hx of SH  According is daughter monitor      Mild PVD:  Continue Aspirin and atorvastatin.      DVT ppx   Lovenox 40 mg SC daily

## 2023-08-09 NOTE — PROGRESS NOTE ADULT - ASSESSMENT
89M w/ hx of CAD s/p CABG, s/p PPM, CHF, seizures, CKD, anemia, HTN, recent UTI p/w decreased PO and weakness. Pt has been very weak and almost bed bound for the past month. Pt has hx of UTIs requiring admission in the past. Around June 4th daughter became concerned over change in pt's mood and awareness. Called PMD on phone and was prescribed 1 week course of Levaquin. Pt did not seem to improve significantly and pt went to see urologist around 6/14. Reportedly urine was checked and pt was switched to Bactrim. Pt otherwise compliant with meds as they are provided by [facility].  (02 Aug 2023 20:38)      CHRONIC KIDNEY DISEASE, STAGE 3:  furosemide   Injectable 20 milliGRAM(s) IV Push a day  Serum creatinine is increasing , approximating a GFR is decreased   ml/min.   There is no progression.  No uremic symptoms. No evidence of  worsening  Anemia. Fluid status stable.   Will continue to avoid nephrotoxic drugs.  Patient remains asymptomatic.  Continue current therapy.      ANEMIA PLAN:  Anemia of chronic disease:  We will consider epo  aiming for a HCT of 32-36 %.   We will monitor Iron stores, B12 and RBC folate .    fluid overload furosemide   Injectable 20 milliGRAM(s) IV Push  a day    BP monitoring,continue current antihypertensive meds, low salt diet,followup with PMD in 1-2 weeks  metoprolol tartrate 25 milliGRAM(s) Oral two times a day    dvt enoxaparin Injectable 30 milliGRAM(s) SubCutaneous every 24 hours

## 2023-08-09 NOTE — CHART NOTE - NSCHARTNOTEFT_GEN_A_CORE
27 Beats of WCT - asymptomatic  No new intervention per Dr. Ruperto Finn to follow up with family regarding decision re: discharge to [facility]    [id number]

## 2023-08-09 NOTE — PROGRESS NOTE ADULT - SUBJECTIVE AND OBJECTIVE BOX
Date of Service: 08-09-23 @ 07:48           CARDIOLOGY     PROGRESS  NOTE   ________________________________________________    CHIEF COMPLAINT:Patient is a 89y old  Male who presents with a chief complaint of FTT (08 Aug 2023 22:06)  no complain, comfortable  	  REVIEW OF SYSTEMS:  CONSTITUTIONAL: No fever, weight loss, or fatigue  EYES: No eye pain, visual disturbances, or discharge  ENT:  No difficulty hearing, tinnitus, vertigo; No sinus or throat pain  NECK: No pain or stiffness  RESPIRATORY: No cough, wheezing, chills or hemoptysis; No Shortness of Breath  CARDIOVASCULAR: No chest pain, palpitations, passing out, dizziness, or leg swelling  GASTROINTESTINAL: No abdominal or epigastric pain. No nausea, vomiting, or hematemesis; No diarrhea or constipation. No melena or hematochezia.  GENITOURINARY: No dysuria, frequency, hematuria, or incontinence  NEUROLOGICAL: No headaches, memory loss, loss of strength, numbness, or tremors  SKIN: No itching, burning, rashes, or lesions   LYMPH Nodes: No enlarged glands  ENDOCRINE: No heat or cold intolerance; No hair loss  MUSCULOSKELETAL: No joint pain or swelling; No muscle, back, or extremity pain  PSYCHIATRIC: No depression, anxiety, mood swings, or difficulty sleeping  HEME/LYMPH: No easy bruising, or bleeding gums  ALLERGY AND IMMUNOLOGIC: No hives or eczema	    [ ] All others negative	  [ x] Unable to obtain    PHYSICAL EXAM:  T(C): 37 (08-09-23 @ 04:42), Max: 37 (08-09-23 @ 04:42)  HR: 58 (08-09-23 @ 04:42) (58 - 61)  BP: 115/64 (08-09-23 @ 04:42) (94/54 - 115/64)  RR: 18 (08-09-23 @ 04:42) (18 - 18)  SpO2: 97% (08-09-23 @ 04:42) (96% - 99%)  Wt(kg): --  I&O's Summary    08 Aug 2023 07:01  -  09 Aug 2023 07:00  --------------------------------------------------------  IN: 200 mL / OUT: 250 mL / NET: -50 mL        Appearance: Normal	  HEENT:   Normal oral mucosa, PERRL, EOMI	  Lymphatic: No lymphadenopathy  Cardiovascular: Normal S1 S2, No JVD, + murmurs, No edema  Respiratory: rhonchi  Psychiatry: A & O x 3, Mood & affect appropriate  Gastrointestinal:  Soft, Non-tender, + BS	  Skin: No rashes, No ecchymoses, No cyanosis	  Neurologic: Non-focal  Extremities: Normal range of motion, No clubbing, cyanosis or edema  Vascular: Peripheral pulses palpable 2+ bilaterally    MEDICATIONS  (STANDING):  aspirin enteric coated 81 milliGRAM(s) Oral daily  atorvastatin 80 milliGRAM(s) Oral at bedtime  chlorhexidine 2% Cloths 1 Application(s) Topical <User Schedule>  enoxaparin Injectable 30 milliGRAM(s) SubCutaneous every 24 hours  folic acid 1 milliGRAM(s) Oral daily  furosemide   Injectable 20 milliGRAM(s) IV Push daily  levETIRAcetam 500 milliGRAM(s) Oral two times a day  metoprolol tartrate 25 milliGRAM(s) Oral two times a day  mirtazapine 15 milliGRAM(s) Oral daily  mupirocin 2% Nasal 1 Application(s) Both Nostrils two times a day  pantoprazole    Tablet 40 milliGRAM(s) Oral before breakfast      TELEMETRY: 	    ECG:  	  RADIOLOGY:  OTHER: 	  	  LABS:	 	    CARDIAC MARKERS:                                10.7   10.32 )-----------( 336      ( 07 Aug 2023 09:59 )             34.0     08-08    144  |  107  |  42<H>  ----------------------------<  88  3.5   |  22  |  2.03<H>    Ca    9.0      08 Aug 2023 12:12  Mg     1.9     08-08      proBNP:   Lipid Profile: Cholesterol 88  LDL --  HDL 31  TG 81    HgA1c:   TSH: Thyroid Stimulating Hormone, Serum: 2.30 uIU/mL (08-04 @ 06:14)          Assessment and plan  ---------------------------  89M w/ hx of CAD s/p CABG, s/p PPM, CHF, seizures, CKD, anemia, HTN, recent UTI p/w decreased PO and weakness. Pt has been very weak and almost bed bound for the past month. Pt has hx of UTIs requiring admission in the past. Around June 4th daughter became concerned over change in pt's mood and awareness. Called PMD on phone and was prescribed 1 week course of Levaquin. Pt did not seem to improve significantly and pt went to see urologist around 6/14. Reportedly urine was checked and pt was switched to Bactrim. Pt otherwise compliant with meds as they are provided by [facility].    chf ?hfpef  check TTE  continue current meds  Lasix 20 mg iv bid  ct chest no contrast noted with bl pleural effusion  will adjust cardiac meds  tele  cardiac enzyme  anemia, check guaiac check cbc  Protonix  abx for UTI  awaiting blood work  echo noted with SEVERE AS as the cause of chf and hx of CAD,  discussed  with family and health care proxy no surgery  continue gentle diuresis  Avoid hypotension/ ACRE or ARB  will repeat chest x ray today  not a surgical candidate, spoke to daughter yesterday  chest x ray noted much improvement, agree to dc on Lasix 20 mg daily and treat symptomaticaly  may dc tele

## 2023-08-10 ENCOUNTER — TRANSCRIPTION ENCOUNTER (OUTPATIENT)
Age: 88
End: 2023-08-10

## 2023-08-10 LAB — SARS-COV-2 RNA SPEC QL NAA+PROBE: DETECTED

## 2023-08-10 RX ADMIN — Medication 25 MILLIGRAM(S): at 06:16

## 2023-08-10 RX ADMIN — LEVETIRACETAM 500 MILLIGRAM(S): 250 TABLET, FILM COATED ORAL at 06:16

## 2023-08-10 RX ADMIN — PANTOPRAZOLE SODIUM 40 MILLIGRAM(S): 20 TABLET, DELAYED RELEASE ORAL at 06:16

## 2023-08-10 RX ADMIN — CHLORHEXIDINE GLUCONATE 1 APPLICATION(S): 213 SOLUTION TOPICAL at 06:09

## 2023-08-10 RX ADMIN — LEVETIRACETAM 500 MILLIGRAM(S): 250 TABLET, FILM COATED ORAL at 18:09

## 2023-08-10 RX ADMIN — Medication 1 MILLIGRAM(S): at 12:47

## 2023-08-10 RX ADMIN — MUPIROCIN 1 APPLICATION(S): 20 OINTMENT TOPICAL at 06:08

## 2023-08-10 RX ADMIN — Medication 20 MILLIGRAM(S): at 06:11

## 2023-08-10 RX ADMIN — Medication 81 MILLIGRAM(S): at 12:47

## 2023-08-10 RX ADMIN — ATORVASTATIN CALCIUM 80 MILLIGRAM(S): 80 TABLET, FILM COATED ORAL at 21:22

## 2023-08-10 RX ADMIN — Medication 25 MILLIGRAM(S): at 18:09

## 2023-08-10 RX ADMIN — MIRTAZAPINE 15 MILLIGRAM(S): 45 TABLET, ORALLY DISINTEGRATING ORAL at 12:47

## 2023-08-10 NOTE — DISCHARGE NOTE PROVIDER - NSDCFUADDINST_GEN_ALL_CORE_FT
You are being discharged with Home Care  Make appointment(s) to follow up with your Healthcare Provider(s) - bring all discharge paperwork including discharge medication list to follow up appointment.

## 2023-08-10 NOTE — DISCHARGE NOTE PROVIDER - HOSPITAL COURSE
89M w/ hx of CAD s/p CABG, s/p PPM, CHF, seizures, CKD, anemia, HTN, SEVERE AS, recent UTI p/w decreased PO and weakness.  family is considering GOC. Currently no IV access.  Pt with progressive lethargy, confusion over last couple of months   # Recurrent UTI/ uncomplicated  - multiple recent treatment course; continues to decline clinically  - UCx now with E. faecium; sensitive to Vancomycin  - As per ID, unclear that treating possible UTI would make significant benefit in his overall clinical course; he remains without symptoms - COVID PCR was negative and CXR did not show any infiltration,  * palliative - family wants hospice at home / Home visiting doctor - arranged by CM  * FTT - received supportive IV fluid, started mirtazapine, started, encouraged PO intake .  Patient does not wish for any peg tube or TF.    * Pulmonary edema - b/l pleural effusion. Resolved.   * SOPHY on CKD presumed due to diuretics. Initially diuresed with IV Lasix, changed to PO - D/C'd on 8/10/23   * Severe AS found on echocardiogram. Patient not candidate for surgery. Consult with palliative recommended comfort care and hospice at home.   * recurring UTI: new UC positive with Enterococcus. Asymptomatic. Most likely does not need abx therapy.   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

## 2023-08-10 NOTE — DISCHARGE NOTE PROVIDER - NSDCMRMEDTOKEN_GEN_ALL_CORE_FT
acetaminophen 325 mg oral tablet: 2 tab(s) orally every 8 hours as needed for PAIN  aspirin 81 mg oral delayed release tablet: 1 tab(s) orally once a day  atorvastatin 80 mg oral tablet: 1 tab(s) orally once a day (at bedtime)  folic acid 1 mg oral tablet: 1 tab(s) orally once a day  guaiFENesin 100 mg/5 mL oral liquid: 10 milliliter(s) orally every 6 hours as needed for decongestion  Martin Lift: Transport from bed to chair and from chair to bed  Invanz 1 g injection: 1 gram(s) intravenously once a day x 7 days for UTI, end date 8/4/23  Keppra 500 mg oral tablet: 1 tab(s) orally 2 times a day  metoprolol tartrate 25 mg oral tablet: 0.5 tab (12.5MG) orally 2 times a day  mirtazapine 7.5 mg oral tablet: 1 tab(s) orally once a day (at bedtime)   PAIN SCALE 5 OF 10. acetaminophen 325 mg oral tablet: 2 tab(s) orally every 8 hours as needed for PAIN  aspirin 81 mg oral delayed release tablet: 1 tab(s) orally once a day  atorvastatin 80 mg oral tablet: 1 tab(s) orally once a day (at bedtime)  folic acid 1 mg oral tablet: 1 tab(s) orally once a day  furosemide 20 mg oral tablet: 1 tab(s) orally once a day  Martin Lift: Transport from bed to chair and from chair to bed  Keppra 500 mg oral tablet: 1 tab(s) orally 2 times a day  metoprolol tartrate 25 mg oral tablet: 1 tab(s) orally 2 times a day  mirtazapine 7.5 mg oral tablet: 1 tab(s) orally once a day (at bedtime)

## 2023-08-10 NOTE — PROGRESS NOTE ADULT - ASSESSMENT
89M w/ hx of CAD s/p CABG, s/p PPM, CHF, seizures, CKD, anemia, HTN, recent UTI p/w decreased PO and weakness. Pt has been very weak and almost bed bound for the past month. Pt has hx of UTIs requiring admission in the past. Around June 4th daughter became concerned over change in pt's mood and awareness. Called PMD on phone and was prescribed 1 week course of Levaquin. Pt did not seem to improve significantly and pt went to see urologist around 6/14. Reportedly urine was checked and pt was switched to Bactrim. Pt otherwise compliant with meds as they are provided by [facility].  (02 Aug 2023 20:38)    failure to thrive and severe Aortic Stenosis   Pt's family no longer wishes to pursue home hospice.      CHRONIC KIDNEY DISEASE, STAGE 3:  furosemide   Injectable 20 milliGRAM(s) IV Push a day  Serum creatinine is increasing , approximating a GFR is decreased   ml/min.   There is no progression.  No uremic symptoms. No evidence of  worsening  Anemia. Fluid status stable.   Will continue to avoid nephrotoxic drugs.  Patient remains asymptomatic.  Continue current therapy.        ANEMIA PLAN:  Anemia of chronic disease:  We will consider epo  aiming for a HCT of 32-36 %.   We will monitor Iron stores, B12 and RBC folate .    fluid overload furosemide   Injectable 20 milliGRAM(s) IV Push  a day    BP monitoring,continue current antihypertensive meds, low salt diet,followup with PMD in 1-2 weeks  metoprolol tartrate 25 milliGRAM(s) Oral two times a day    dvt enoxaparin Injectable 30 milliGRAM(s) SubCutaneous every 24 hours

## 2023-08-10 NOTE — DISCHARGE NOTE PROVIDER - CARE PROVIDER_API CALL
Lester Rivera  Internal Medicine  [address] [place] [address]  Phone: ()-  Fax: ()-  Follow Up Time:

## 2023-08-10 NOTE — CHART NOTE - NSCHARTNOTEFT_GEN_A_CORE
HO consulted to assist in GOC discussion in the setting of patient with advanced illness. HO received call from patient's daughter Sekou today, who states that whilst she does not wish to pursue home hospice referral for patient at this time, she is interested in other supportive home programs available moving forward. LCSW discussed programs including home care, palliative home care, and house calls programs, and overviews of each provided today. All questions answered, and Sekou states she is interested in pursuing palliative home care and house calls for patient. JUDITW informed CM for referrals and follow up, and assured Sekou of ongoing availability and support. Sekou verbalized appreciation of information today.    HO signing off, as goals are clearly established, but remains available for reconsult if needed. JUDITW remains available.

## 2023-08-10 NOTE — DISCHARGE NOTE PROVIDER - DETAILS OF MALNUTRITION DIAGNOSIS/DIAGNOSES
This patient has been assessed with a concern for Malnutrition and was treated during this hospitalization for the following Nutrition diagnosis/diagnoses:     -  08/05/2023: Severe protein-calorie malnutrition

## 2023-08-10 NOTE — PROGRESS NOTE ADULT - SUBJECTIVE AND OBJECTIVE BOX
Date of Service: 08-10-23 @ 08:41           CARDIOLOGY     PROGRESS  NOTE   ________________________________________________    CHIEF COMPLAINT:Patient is a 89y old  Male who presents with a chief complaint of FTT (09 Aug 2023 22:09)  comfortable  	  REVIEW OF SYSTEMS:  CONSTITUTIONAL: No fever, weight loss, or fatigue  EYES: No eye pain, visual disturbances, or discharge  ENT:  No difficulty hearing, tinnitus, vertigo; No sinus or throat pain  NECK: No pain or stiffness  RESPIRATORY: No cough, wheezing, chills or hemoptysis; No Shortness of Breath  CARDIOVASCULAR: No chest pain, palpitations, passing out, dizziness, or leg swelling  GASTROINTESTINAL: No abdominal or epigastric pain. No nausea, vomiting, or hematemesis; No diarrhea or constipation. No melena or hematochezia.  GENITOURINARY: No dysuria, frequency, hematuria, or incontinence  NEUROLOGICAL: No headaches, memory loss, loss of strength, numbness, or tremors  SKIN: No itching, burning, rashes, or lesions   LYMPH Nodes: No enlarged glands  ENDOCRINE: No heat or cold intolerance; No hair loss  MUSCULOSKELETAL: No joint pain or swelling; No muscle, back, or extremity pain  PSYCHIATRIC: No depression, anxiety, mood swings, or difficulty sleeping  HEME/LYMPH: No easy bruising, or bleeding gums  ALLERGY AND IMMUNOLOGIC: No hives or eczema	    [x ] All others negative	  [ ] Unable to obtain    PHYSICAL EXAM:  T(C): 36.6 (08-10-23 @ 04:42), Max: 36.6 (08-09-23 @ 11:05)  HR: 53 (08-10-23 @ 04:42) (53 - 60)  BP: 96/64 (08-10-23 @ 04:42) (96/60 - 109/64)  RR: 18 (08-10-23 @ 04:42) (16 - 18)  SpO2: 99% (08-10-23 @ 04:42) (94% - 99%)  Wt(kg): --  I&O's Summary    09 Aug 2023 07:01  -  10 Aug 2023 07:00  --------------------------------------------------------  IN: 360 mL / OUT: 0 mL / NET: 360 mL        Appearance: Normal	  HEENT:   Normal oral mucosa, PERRL, EOMI	  Lymphatic: No lymphadenopathy  Cardiovascular: Normal S1 S2, No JVD,+murmurs, No edema  Respiratory: rhopnchi  Psychiatry: A & O x 3, Mood & affect appropriate  Gastrointestinal:  Soft, Non-tender, + BS	  Skin: No rashes, No ecchymoses, No cyanosis	  Neurologic: Non-focal  Extremities: Normal range of motion, No clubbing, cyanosis or edema  Vascular: Peripheral pulses palpable 2+ bilaterally    MEDICATIONS  (STANDING):  aspirin enteric coated 81 milliGRAM(s) Oral daily  atorvastatin 80 milliGRAM(s) Oral at bedtime  chlorhexidine 2% Cloths 1 Application(s) Topical <User Schedule>  enoxaparin Injectable 30 milliGRAM(s) SubCutaneous every 24 hours  folic acid 1 milliGRAM(s) Oral daily  furosemide   Injectable 20 milliGRAM(s) IV Push daily  levETIRAcetam 500 milliGRAM(s) Oral two times a day  metoprolol tartrate 25 milliGRAM(s) Oral two times a day  mirtazapine 15 milliGRAM(s) Oral daily  pantoprazole    Tablet 40 milliGRAM(s) Oral before breakfast      TELEMETRY: 	    ECG:  	  RADIOLOGY:  OTHER: 	  	  LABS:	 	    CARDIAC MARKERS:            08-09    143  |  103  |  42<H>  ----------------------------<  102<H>  3.4<L>   |  23  |  2.23<H>    Ca    9.6      09 Aug 2023 07:17  Mg     1.9     08-08      proBNP:   Lipid Profile: Cholesterol 88  LDL --  HDL 31  TG 81    HgA1c:   TSH: Thyroid Stimulating Hormone, Serum: 2.30 uIU/mL (08-04 @ 06:14)      27 Beats of WCT - asymptomatic  No new intervention per Dr. Ruperto Finn to follow up with family regarding decision re: discharge to [facility]    Assessment and plan  ---------------------------  89M w/ hx of CAD s/p CABG, s/p PPM, CHF, seizures, CKD, anemia, HTN, recent UTI p/w decreased PO and weakness. Pt has been very weak and almost bed bound for the past month. Pt has hx of UTIs requiring admission in the past. Around June 4th daughter became concerned over change in pt's mood and awareness. Called PMD on phone and was prescribed 1 week course of Levaquin. Pt did not seem to improve significantly and pt went to see urologist around 6/14. Reportedly urine was checked and pt was switched to Bactrim. Pt otherwise compliant with meds as they are provided by [facility].    chf ?hfpef  check TTE  continue current meds  Lasix 20 mg iv bid  ct chest no contrast noted with bl pleural effusion  will adjust cardiac meds  tele  cardiac enzyme  anemia, check guaiac check cbc  Protonix  abx for UTI  awaiting blood work  echo noted with SEVERE AS as the cause of chf and hx of CAD,  discussed  with family and health care proxy no surgery  continue gentle diuresis  Avoid hypotension/ ACRE or ARB  will repeat chest x ray today  not a surgical candidate, spoke to daughter yesterday  chest x ray noted much improvement, agree to dc on Lasix 20 mg daily and treat symptomaticaly  events noted, runs  of WCT  continue beta blocker/ no aggressive measures as per  daughter

## 2023-08-10 NOTE — CHART NOTE - NSCHARTNOTEFT_GEN_A_CORE
Based on patients severe debility, failure to thrive and severe Aortic Stenosis, he requires a Martin to be transported out of bed to chair and from chair back to bed.

## 2023-08-10 NOTE — DISCHARGE NOTE PROVIDER - NSDCCPCAREPLAN_GEN_ALL_CORE_FT
PRINCIPAL DISCHARGE DIAGNOSIS  Diagnosis: Adult failure to thrive  Assessment and Plan of Treatment: Supportive Care      SECONDARY DISCHARGE DIAGNOSES  Diagnosis: E-coli UTI  Assessment and Plan of Treatment: Asymptomatic    Diagnosis: Severe aortic stenosis  Assessment and Plan of Treatment: Supportive care

## 2023-08-10 NOTE — PROVIDER CONTACT NOTE (CRITICAL VALUE NOTIFICATION) - ACTION/TREATMENT ORDERED:
Orders received to send second COVID swab for confirmation.
Provider made aware and NNO at this time.

## 2023-08-10 NOTE — PROGRESS NOTE ADULT - TIME BILLING
Discussed with NP
Discussed with NP.
Discussed with PA.
d/w pa and also DTR
Discussed with NP. Discussed with  family regarding advanced care planning  for at least 30 minutes. Reviewed MOLST form and they did not make a decision yet.
Discussed with PA and palliative care doctor. Discussed with  daughter at bedside regarding advanced care planning  for at least 30 minutes. Reviewed MOLST form and decided to keep it as  DNR/ DNI/DNH/ hospice/ comfort care.
Symptom assessment and management, supportive counseling, coordination of care

## 2023-08-10 NOTE — PROVIDER CONTACT NOTE (CRITICAL VALUE NOTIFICATION) - ASSESSMENT
Lethargic- arouses to voices & stimulation. Follows commands. VSS, afebrile
Pt AOx3, RA, latest vss, afebrile, SR on tele.

## 2023-08-10 NOTE — PROGRESS NOTE ADULT - SUBJECTIVE AND OBJECTIVE BOX
Patient is a 89y Male whom presented to the hospital with ckd stage3     PAST MEDICAL & SURGICAL HISTORY:  CHF, chronic      CAD (coronary artery disease)      Essential hypertension      FH: CABG (coronary artery bypass surgery)          MEDICATIONS  (STANDING):  aspirin enteric coated 81 milliGRAM(s) Oral daily  atorvastatin 80 milliGRAM(s) Oral at bedtime  enoxaparin Injectable 40 milliGRAM(s) SubCutaneous every 24 hours  folic acid 1 milliGRAM(s) Oral daily  furosemide   Injectable 20 milliGRAM(s) IV Push two times a day  levETIRAcetam 500 milliGRAM(s) Oral two times a day  metoprolol tartrate 25 milliGRAM(s) Oral two times a day  mirtazapine 15 milliGRAM(s) Oral daily  pantoprazole    Tablet 40 milliGRAM(s) Oral before breakfast      Allergies    No Known Allergies    Intolerances        SOCIAL HISTORY:  Denies ETOh,Smoking,     FAMILY HISTORY:      REVIEW OF SYSTEMS:    CONSTITUTIONAL: No weakness, fevers or chills  RESPIRATORY: No cough, wheezing, hemoptysis; No shortness of breath                               08-09    143  |  103  |  42<H>  ----------------------------<  102<H>  3.4<L>   |  23  |  2.23<H>    Ca    9.6      09 Aug 2023 07:17        CAPILLARY BLOOD GLUCOSE          Vital Signs Last 24 Hrs  T(C): 36.4 (10 Aug 2023 11:10), Max: 36.6 (10 Aug 2023 04:42)  T(F): 97.6 (10 Aug 2023 11:10), Max: 97.9 (10 Aug 2023 04:42)  HR: 63 (10 Aug 2023 17:58) (53 - 74)  BP: 136/62 (10 Aug 2023 17:58) (96/60 - 136/62)  BP(mean): --  RR: 18 (10 Aug 2023 17:58) (16 - 18)  SpO2: 99% (10 Aug 2023 17:58) (94% - 99%)    Parameters below as of 10 Aug 2023 17:58  Patient On (Oxygen Delivery Method): room air        Urinalysis Basic - ( 09 Aug 2023 07:17 )    Color: x / Appearance: x / SG: x / pH: x  Gluc: 102 mg/dL / Ketone: x  / Bili: x / Urobili: x   Blood: x / Protein: x / Nitrite: x   Leuk Esterase: x / RBC: x / WBC x   Sq Epi: x / Non Sq Epi: x / Bacteria: x                  PHYSICAL EXAM:    Constitutional: NAD  HEENT: conjunctive   clear   Neck:  No JVD  Respiratory: decrease bs b/l   Cardiovascular: S1 and S2  Gastrointestinal: BS+, soft, NT/ND  Extremities: No peripheral edema

## 2023-08-11 ENCOUNTER — TRANSCRIPTION ENCOUNTER (OUTPATIENT)
Age: 88
End: 2023-08-11

## 2023-08-11 VITALS — HEART RATE: 60 BPM | SYSTOLIC BLOOD PRESSURE: 139 MMHG | DIASTOLIC BLOOD PRESSURE: 68 MMHG

## 2023-08-11 LAB — SARS-COV-2 RNA SPEC QL NAA+PROBE: SIGNIFICANT CHANGE UP

## 2023-08-11 PROCEDURE — 87086 URINE CULTURE/COLONY COUNT: CPT

## 2023-08-11 PROCEDURE — 84134 ASSAY OF PREALBUMIN: CPT

## 2023-08-11 PROCEDURE — 87077 CULTURE AEROBIC IDENTIFY: CPT

## 2023-08-11 PROCEDURE — 82803 BLOOD GASES ANY COMBINATION: CPT

## 2023-08-11 PROCEDURE — 82435 ASSAY OF BLOOD CHLORIDE: CPT

## 2023-08-11 PROCEDURE — 84100 ASSAY OF PHOSPHORUS: CPT

## 2023-08-11 PROCEDURE — 84443 ASSAY THYROID STIM HORMONE: CPT

## 2023-08-11 PROCEDURE — C8929: CPT

## 2023-08-11 PROCEDURE — 85018 HEMOGLOBIN: CPT

## 2023-08-11 PROCEDURE — 97530 THERAPEUTIC ACTIVITIES: CPT

## 2023-08-11 PROCEDURE — 99285 EMERGENCY DEPT VISIT HI MDM: CPT | Mod: 25

## 2023-08-11 PROCEDURE — 97162 PT EVAL MOD COMPLEX 30 MIN: CPT

## 2023-08-11 PROCEDURE — 96374 THER/PROPH/DIAG INJ IV PUSH: CPT

## 2023-08-11 PROCEDURE — 84295 ASSAY OF SERUM SODIUM: CPT

## 2023-08-11 PROCEDURE — 87640 STAPH A DNA AMP PROBE: CPT

## 2023-08-11 PROCEDURE — 81001 URINALYSIS AUTO W/SCOPE: CPT

## 2023-08-11 PROCEDURE — 87186 SC STD MICRODIL/AGAR DIL: CPT

## 2023-08-11 PROCEDURE — 83735 ASSAY OF MAGNESIUM: CPT

## 2023-08-11 PROCEDURE — 84145 PROCALCITONIN (PCT): CPT

## 2023-08-11 PROCEDURE — 80048 BASIC METABOLIC PNL TOTAL CA: CPT

## 2023-08-11 PROCEDURE — 36415 COLL VENOUS BLD VENIPUNCTURE: CPT

## 2023-08-11 PROCEDURE — 80061 LIPID PANEL: CPT

## 2023-08-11 PROCEDURE — 71045 X-RAY EXAM CHEST 1 VIEW: CPT

## 2023-08-11 PROCEDURE — 85025 COMPLETE CBC W/AUTO DIFF WBC: CPT

## 2023-08-11 PROCEDURE — 85014 HEMATOCRIT: CPT

## 2023-08-11 PROCEDURE — 83605 ASSAY OF LACTIC ACID: CPT

## 2023-08-11 PROCEDURE — 87641 MR-STAPH DNA AMP PROBE: CPT

## 2023-08-11 PROCEDURE — 87635 SARS-COV-2 COVID-19 AMP PRB: CPT

## 2023-08-11 PROCEDURE — 82947 ASSAY GLUCOSE BLOOD QUANT: CPT

## 2023-08-11 PROCEDURE — 92526 ORAL FUNCTION THERAPY: CPT

## 2023-08-11 PROCEDURE — 84132 ASSAY OF SERUM POTASSIUM: CPT

## 2023-08-11 PROCEDURE — 84484 ASSAY OF TROPONIN QUANT: CPT

## 2023-08-11 PROCEDURE — 97110 THERAPEUTIC EXERCISES: CPT

## 2023-08-11 PROCEDURE — 85027 COMPLETE CBC AUTOMATED: CPT

## 2023-08-11 PROCEDURE — 71250 CT THORAX DX C-: CPT | Mod: MA

## 2023-08-11 PROCEDURE — 83036 HEMOGLOBIN GLYCOSYLATED A1C: CPT

## 2023-08-11 PROCEDURE — 82330 ASSAY OF CALCIUM: CPT

## 2023-08-11 PROCEDURE — 92610 EVALUATE SWALLOWING FUNCTION: CPT

## 2023-08-11 PROCEDURE — 83880 ASSAY OF NATRIURETIC PEPTIDE: CPT

## 2023-08-11 PROCEDURE — 80053 COMPREHEN METABOLIC PANEL: CPT

## 2023-08-11 RX ORDER — FUROSEMIDE 40 MG
1 TABLET ORAL
Qty: 30 | Refills: 0
Start: 2023-08-11 | End: 2023-09-09

## 2023-08-11 RX ORDER — ERTAPENEM SODIUM 1 G/1
1 INJECTION, POWDER, LYOPHILIZED, FOR SOLUTION INTRAMUSCULAR; INTRAVENOUS
Refills: 0 | DISCHARGE

## 2023-08-11 RX ORDER — METOPROLOL TARTRATE 50 MG
0.5 TABLET ORAL
Refills: 0 | DISCHARGE

## 2023-08-11 RX ORDER — METOPROLOL TARTRATE 50 MG
1 TABLET ORAL
Qty: 60 | Refills: 0
Start: 2023-08-11 | End: 2023-09-09

## 2023-08-11 RX ORDER — FUROSEMIDE 40 MG
20 TABLET ORAL DAILY
Refills: 0 | Status: DISCONTINUED | OUTPATIENT
Start: 2023-08-11 | End: 2023-08-11

## 2023-08-11 RX ADMIN — Medication 25 MILLIGRAM(S): at 06:27

## 2023-08-11 RX ADMIN — CHLORHEXIDINE GLUCONATE 1 APPLICATION(S): 213 SOLUTION TOPICAL at 07:55

## 2023-08-11 RX ADMIN — Medication 1 MILLIGRAM(S): at 12:05

## 2023-08-11 RX ADMIN — LEVETIRACETAM 500 MILLIGRAM(S): 250 TABLET, FILM COATED ORAL at 06:27

## 2023-08-11 RX ADMIN — Medication 25 MILLIGRAM(S): at 17:39

## 2023-08-11 RX ADMIN — LEVETIRACETAM 500 MILLIGRAM(S): 250 TABLET, FILM COATED ORAL at 17:39

## 2023-08-11 RX ADMIN — Medication 81 MILLIGRAM(S): at 12:05

## 2023-08-11 RX ADMIN — PANTOPRAZOLE SODIUM 40 MILLIGRAM(S): 20 TABLET, DELAYED RELEASE ORAL at 06:31

## 2023-08-11 RX ADMIN — MIRTAZAPINE 15 MILLIGRAM(S): 45 TABLET, ORALLY DISINTEGRATING ORAL at 12:04

## 2023-08-11 NOTE — PROGRESS NOTE ADULT - ASSESSMENT
89M w/ hx of CAD s/p CABG, s/p PPM, CHF, seizures, CKD, anemia, HTN, recent UTI p/w decreased PO and weakness. Pt has been very weak and almost bed bound for the past month. Pt has hx of UTIs requiring admission in the past. Around June 4th daughter became concerned over change in pt's mood and awareness. Called PMD on phone and was prescribed 1 week course of Levaquin. Pt did not seem to improve significantly and pt went to see urologist around 6/14. Reportedly urine was checked and pt was switched to Bactrim. Pt otherwise compliant with meds as they are provided by [facility].  (02 Aug 2023 20:38)    failure to thrive and severe Aortic Stenosis   Pt's family no longer wishes to pursue home hospice.      CHRONIC KIDNEY DISEASE, STAGE 3:  furosemide   Injectable 20 milliGRAM(s) IV Push a day  Serum creatinine is increasing , approximating a GFR is decreased   ml/min.   There is no progression.  No uremic symptoms. No evidence of  worsening  Anemia. Fluid status stable.   Will continue to avoid nephrotoxic drugs.  Patient remains asymptomatic.  Continue current therapy.        ANEMIA PLAN:  Anemia of chronic disease:  We will consider epo  aiming for a HCT of 32-36 %.   We will monitor Iron stores, B12 and RBC folate .    fluid overload furosemide   Injectable 20 milliGRAM(s) IV Push  a day    BP monitoring,continue current antihypertensive meds, low salt diet,followup with PMD in 1-2 weeks  metoprolol tartrate 25 milliGRAM(s) Oral two times a day    dvt enoxaparin Injectable 30 milliGRAM(s) SubCutaneous every 24 hours

## 2023-08-11 NOTE — PROGRESS NOTE ADULT - REASON FOR ADMISSION
FTT
Failure to thrive
FTT

## 2023-08-11 NOTE — PROGRESS NOTE ADULT - NUTRITIONAL ASSESSMENT
MEDICATIONS  (STANDING):  aspirin enteric coated 81 milliGRAM(s) Oral daily  atorvastatin 80 milliGRAM(s) Oral at bedtime  chlorhexidine 2% Cloths 1 Application(s) Topical <User Schedule>  enoxaparin Injectable 30 milliGRAM(s) SubCutaneous every 24 hours  folic acid 1 milliGRAM(s) Oral daily  furosemide   Injectable 20 milliGRAM(s) IV Push daily  levETIRAcetam 500 milliGRAM(s) Oral two times a day  metoprolol tartrate 25 milliGRAM(s) Oral two times a day  mirtazapine 15 milliGRAM(s) Oral daily  mupirocin 2% Nasal 1 Application(s) Both Nostrils two times a day  pantoprazole    Tablet 40 milliGRAM(s) Oral before breakfast
This patient has been assessed with a concern for Malnutrition and has been determined to have a diagnosis/diagnoses of Severe protein-calorie malnutrition.    This patient is being managed with:   Diet Regular-  Supplement Feeding Modality:  Oral  Ensure Enlive Cans or Servings Per Day:  2       Frequency:  Daily  Entered: Aug  8 2023  3:18PM    Diet Regular-  Entered: Aug  5 2023  2:18PM    The following pending diet order is being considered for treatment of Severe protein-calorie malnutrition:null
MEDICATIONS  (STANDING):  aspirin enteric coated 81 milliGRAM(s) Oral daily  atorvastatin 80 milliGRAM(s) Oral at bedtime  enoxaparin Injectable 40 milliGRAM(s) SubCutaneous every 24 hours  folic acid 1 milliGRAM(s) Oral daily  furosemide   Injectable 20 milliGRAM(s) IV Push two times a day  levETIRAcetam 500 milliGRAM(s) Oral two times a day  metoprolol tartrate 25 milliGRAM(s) Oral two times a day  mirtazapine 15 milliGRAM(s) Oral daily  pantoprazole    Tablet 40 milliGRAM(s) Oral before breakfast
This patient has been assessed with a concern for Malnutrition and has been determined to have a diagnosis/diagnoses of Severe protein-calorie malnutrition.    This patient is being managed with:   Diet Regular-  Supplement Feeding Modality:  Oral  Ensure Enlive Cans or Servings Per Day:  2       Frequency:  Daily  Entered: Aug  8 2023  3:18PM    Diet Regular-  Entered: Aug  5 2023  2:18PM    The following pending diet order is being considered for treatment of Severe protein-calorie malnutrition:null
MEDICATIONS  (STANDING):  aspirin enteric coated 81 milliGRAM(s) Oral daily  atorvastatin 80 milliGRAM(s) Oral at bedtime  chlorhexidine 2% Cloths 1 Application(s) Topical <User Schedule>  enoxaparin Injectable 30 milliGRAM(s) SubCutaneous every 24 hours  folic acid 1 milliGRAM(s) Oral daily  levETIRAcetam 500 milliGRAM(s) Oral two times a day  metoprolol tartrate 25 milliGRAM(s) Oral two times a day  mirtazapine 15 milliGRAM(s) Oral daily  pantoprazole    Tablet 40 milliGRAM(s) Oral before breakfast
MEDICATIONS  (STANDING):  aspirin enteric coated 81 milliGRAM(s) Oral daily  atorvastatin 80 milliGRAM(s) Oral at bedtime  enoxaparin Injectable 40 milliGRAM(s) SubCutaneous every 24 hours  folic acid 1 milliGRAM(s) Oral daily  furosemide   Injectable 20 milliGRAM(s) IV Push two times a day  levETIRAcetam 500 milliGRAM(s) Oral two times a day  metoprolol tartrate 25 milliGRAM(s) Oral two times a day  mirtazapine 15 milliGRAM(s) Oral daily  pantoprazole    Tablet 40 milliGRAM(s) Oral before breakfast
This patient has been assessed with a concern for Malnutrition and has been determined to have a diagnosis/diagnoses of Severe protein-calorie malnutrition.    This patient is being managed with:   Diet Regular-  Supplement Feeding Modality:  Oral  Ensure Enlive Cans or Servings Per Day:  2       Frequency:  Daily  Entered: Aug  8 2023  3:18PM    Diet Regular-  Entered: Aug  5 2023  2:18PM    The following pending diet order is being considered for treatment of Severe protein-calorie malnutrition:null
MEDICATIONS  (STANDING):  aspirin enteric coated 81 milliGRAM(s) Oral daily  atorvastatin 80 milliGRAM(s) Oral at bedtime  chlorhexidine 2% Cloths 1 Application(s) Topical <User Schedule>  enoxaparin Injectable 30 milliGRAM(s) SubCutaneous every 24 hours  folic acid 1 milliGRAM(s) Oral daily  furosemide   Injectable 20 milliGRAM(s) IV Push daily  levETIRAcetam 500 milliGRAM(s) Oral two times a day  metoprolol tartrate 25 milliGRAM(s) Oral two times a day  mirtazapine 15 milliGRAM(s) Oral daily  mupirocin 2% Nasal 1 Application(s) Both Nostrils two times a day  pantoprazole    Tablet 40 milliGRAM(s) Oral before breakfast
MEDICATIONS  (STANDING):  aspirin enteric coated 81 milliGRAM(s) Oral daily  atorvastatin 80 milliGRAM(s) Oral at bedtime  chlorhexidine 2% Cloths 1 Application(s) Topical <User Schedule>  enoxaparin Injectable 30 milliGRAM(s) SubCutaneous every 24 hours  folic acid 1 milliGRAM(s) Oral daily  levETIRAcetam 500 milliGRAM(s) Oral two times a day  metoprolol tartrate 25 milliGRAM(s) Oral two times a day  mirtazapine 15 milliGRAM(s) Oral daily  pantoprazole    Tablet 40 milliGRAM(s) Oral before breakfast
MEDICATIONS  (STANDING):  aspirin enteric coated 81 milliGRAM(s) Oral daily  atorvastatin 80 milliGRAM(s) Oral at bedtime  enoxaparin Injectable 40 milliGRAM(s) SubCutaneous every 24 hours  folic acid 1 milliGRAM(s) Oral daily  furosemide   Injectable 20 milliGRAM(s) IV Push two times a day  levETIRAcetam 500 milliGRAM(s) Oral two times a day  metoprolol tartrate 25 milliGRAM(s) Oral two times a day  mirtazapine 15 milliGRAM(s) Oral daily  pantoprazole    Tablet 40 milliGRAM(s) Oral before breakfast
MEDICATIONS  (STANDING):  aspirin enteric coated 81 milliGRAM(s) Oral daily  atorvastatin 80 milliGRAM(s) Oral at bedtime  enoxaparin Injectable 40 milliGRAM(s) SubCutaneous every 24 hours  folic acid 1 milliGRAM(s) Oral daily  furosemide   Injectable 20 milliGRAM(s) IV Push two times a day  levETIRAcetam 500 milliGRAM(s) Oral two times a day  metoprolol tartrate 25 milliGRAM(s) Oral two times a day  mirtazapine 15 milliGRAM(s) Oral daily  pantoprazole    Tablet 40 milliGRAM(s) Oral before breakfast
This patient has been assessed with a concern for Malnutrition and has been determined to have a diagnosis/diagnoses of Severe protein-calorie malnutrition.    This patient is being managed with:   Diet Regular-  Entered: Aug  5 2023  2:18PM  
Patient with decreased oral intake due to condition. Discussed pleasure feeds. Feeding options to be discussed further by primary team.

## 2023-08-11 NOTE — PROGRESS NOTE ADULT - SUBJECTIVE AND OBJECTIVE BOX
Date of Service: 08-11-23 @ 07:54           CARDIOLOGY     PROGRESS  NOTE   ________________________________________________    CHIEF COMPLAINT:Patient is a 89y old  Male who presents with a chief complaint of FTT (10 Aug 2023 19:50)  comfortable  	  REVIEW OF SYSTEMS:  CONSTITUTIONAL: No fever, weight loss, or fatigue  EYES: No eye pain, visual disturbances, or discharge  ENT:  No difficulty hearing, tinnitus, vertigo; No sinus or throat pain  NECK: No pain or stiffness  RESPIRATORY: No cough, wheezing, chills or hemoptysis; no  Shortness of Breath  CARDIOVASCULAR: No chest pain, palpitations, passing out, dizziness, or leg swelling  GASTROINTESTINAL: No abdominal or epigastric pain. No nausea, vomiting, or hematemesis; No diarrhea or constipation. No melena or hematochezia.  GENITOURINARY: No dysuria, frequency, hematuria, or incontinence  NEUROLOGICAL: No headaches, memory loss, loss of strength, numbness, or tremors  SKIN: No itching, burning, rashes, or lesions   LYMPH Nodes: No enlarged glands  ENDOCRINE: No heat or cold intolerance; No hair loss  MUSCULOSKELETAL: No joint pain or swelling; No muscle, back, or extremity pain  PSYCHIATRIC: No depression, anxiety, mood swings, or difficulty sleeping  HEME/LYMPH: No easy bruising, or bleeding gums  ALLERGY AND IMMUNOLOGIC: No hives or eczema	    [ ] All others negative	  [ x] Unable to obtain    PHYSICAL EXAM:  T(C): 37.2 (08-11-23 @ 04:29), Max: 37.2 (08-11-23 @ 04:29)  HR: 61 (08-11-23 @ 04:29) (60 - 74)  BP: 132/63 (08-11-23 @ 04:29) (111/69 - 136/62)  RR: 18 (08-11-23 @ 04:29) (18 - 18)  SpO2: 96% (08-11-23 @ 04:29) (96% - 99%)  Wt(kg): --  I&O's Summary    10 Aug 2023 07:01  -  11 Aug 2023 07:00  --------------------------------------------------------  IN: 120 mL / OUT: 0 mL / NET: 120 mL        Appearance: Normal	  HEENT:   Normal oral mucosa, PERRL, EOMI	  Lymphatic: No lymphadenopathy  Cardiovascular: Normal S1 S2, No JVD, + murmurs, No edema  Respiratory: rhonchi  Psychiatry: dementia  Gastrointestinal:  Soft, Non-tender, + BS	  Skin: No rashes, No ecchymoses, No cyanosis	  Neurologic: Non-focal  Extremities: Normal range of motion, No clubbing, cyanosis or edema  Vascular: Peripheral pulses palpable 2+ bilaterally    MEDICATIONS  (STANDING):  aspirin enteric coated 81 milliGRAM(s) Oral daily  atorvastatin 80 milliGRAM(s) Oral at bedtime  chlorhexidine 2% Cloths 1 Application(s) Topical <User Schedule>  enoxaparin Injectable 30 milliGRAM(s) SubCutaneous every 24 hours  folic acid 1 milliGRAM(s) Oral daily  levETIRAcetam 500 milliGRAM(s) Oral two times a day  metoprolol tartrate 25 milliGRAM(s) Oral two times a day  mirtazapine 15 milliGRAM(s) Oral daily  pantoprazole    Tablet 40 milliGRAM(s) Oral before breakfast      TELEMETRY: 	    ECG:  	  RADIOLOGY:  OTHER: 	  	  LABS:	 	    CARDIAC MARKERS:      proBNP:   Lipid Profile: Cholesterol 88  LDL --  HDL 31  TG 81    HgA1c:   TSH: Thyroid Stimulating Hormone, Serum: 2.30 uIU/mL (08-04 @ 06:14)    Notes: Chart reviewed. Pt's family no longer wishes to pursue home hospice.  Discussed discharge plan with pt's daughter Sekou Fleming, [phone] who  request referral to [facility] for home care. Referral made. House Calls Program  list emailed to her, [email or web address]. Sekou requested a [employer] lift as  well. Treatment team made aware. Remain available to assist.    Assessment and plan  ---------------------------  89M w/ hx of CAD s/p CABG, s/p PPM, CHF, seizures, CKD, anemia, HTN, recent UTI p/w decreased PO and weakness. Pt has been very weak and almost bed bound for the past month. Pt has hx of UTIs requiring admission in the past. Around June 4th daughter became concerned over change in pt's mood and awareness. Called PMD on phone and was prescribed 1 week course of Levaquin. Pt did not seem to improve significantly and pt went to see urologist around 6/14. Reportedly urine was checked and pt was switched to Bactrim. Pt otherwise compliant with meds as they are provided by [facility].    chf ?hfpef  check TTE  continue current meds  Lasix 20 mg iv bid  ct chest no contrast noted with bl pleural effusion  will adjust cardiac meds  tele  cardiac enzyme  anemia, check guaiac check cbc  Protonix  abx for UTI  awaiting blood work  echo noted with SEVERE AS as the cause of chf and hx of CAD,  discussed  with family and health care proxy no surgery  continue gentle diuresis  Avoid hypotension/ ACRE or ARB  will repeat chest x ray today  not a surgical candidate, spoke to daughter yesterday  chest x ray noted much improvement, agree to dc on Lasix 20 mg daily and treat symptomatically  events noted, runs  of WCT  continue beta blocker/ no aggressive measures as per  daughter  re start on lasix 20 mg daily po  dc planning

## 2023-08-11 NOTE — DISCHARGE NOTE NURSING/CASE MANAGEMENT/SOCIAL WORK - PATIENT PORTAL LINK FT
You can access the FollowMyHealth Patient Portal offered by [facility] by registering at the following website: [email or web address]. By joining [facility]’s FollowMyHealth portal, you will also be able to view your health information using other applications (apps) compatible with our system.

## 2023-08-11 NOTE — PROGRESS NOTE ADULT - SUBJECTIVE AND OBJECTIVE BOX
Patient is a 89y Male whom presented to the hospital with ckd stage3     PAST MEDICAL & SURGICAL HISTORY:  CHF, chronic      CAD (coronary artery disease)      Essential hypertension      FH: CABG (coronary artery bypass surgery)          MEDICATIONS  (STANDING):  aspirin enteric coated 81 milliGRAM(s) Oral daily  atorvastatin 80 milliGRAM(s) Oral at bedtime  enoxaparin Injectable 40 milliGRAM(s) SubCutaneous every 24 hours  folic acid 1 milliGRAM(s) Oral daily  furosemide   Injectable 20 milliGRAM(s) IV Push two times a day  levETIRAcetam 500 milliGRAM(s) Oral two times a day  metoprolol tartrate 25 milliGRAM(s) Oral two times a day  mirtazapine 15 milliGRAM(s) Oral daily  pantoprazole    Tablet 40 milliGRAM(s) Oral before breakfast      Allergies    No Known Allergies    Intolerances        SOCIAL HISTORY:  Denies ETOh,Smoking,     FAMILY HISTORY:      REVIEW OF SYSTEMS:    CONSTITUTIONAL: No weakness, fevers or chills                        CAPILLARY BLOOD GLUCOSE          Vital Signs Last 24 Hrs  T(C): 36.6 (11 Aug 2023 10:47), Max: 37.2 (11 Aug 2023 04:29)  T(F): 97.8 (11 Aug 2023 10:47), Max: 99 (11 Aug 2023 04:29)  HR: 60 (11 Aug 2023 17:37) (54 - 61)  BP: 139/68 (11 Aug 2023 17:37) (108/64 - 139/68)  BP(mean): --  RR: 18 (11 Aug 2023 10:47) (18 - 18)  SpO2: 95% (11 Aug 2023 10:47) (95% - 98%)    Parameters below as of 11 Aug 2023 10:47  Patient On (Oxygen Delivery Method): room air                          PHYSICAL EXAM:    Constitutional: NAD  HEENT: conjunctive   clear   Neck:  No JVD  Respiratory: decrease bs b/l   Cardiovascular: S1 and S2  Gastrointestinal: BS+, soft, NT/ND  Extremities: No peripheral edema

## 2023-08-11 NOTE — PROGRESS NOTE ADULT - PROVIDER SPECIALTY LIST ADULT
Cardiology
Cardiology
Internal Medicine
Nephrology
Nephrology
Cardiology
Infectious Disease
Internal Medicine
Nephrology
Nephrology
Cardiology
Internal Medicine
Nephrology
Internal Medicine
Internal Medicine
Nephrology
Internal Medicine
Palliative Care

## 2023-08-12 ENCOUNTER — TRANSCRIPTION ENCOUNTER (OUTPATIENT)
Age: 88
End: 2023-08-12

## 2023-08-18 ENCOUNTER — TRANSCRIPTION ENCOUNTER (OUTPATIENT)
Age: 88
End: 2023-08-18

## 2023-09-06 ENCOUNTER — TRANSCRIPTION ENCOUNTER (OUTPATIENT)
Age: 88
End: 2023-09-06

## 2024-02-18 NOTE — PROGRESS NOTE ADULT - SUBJECTIVE AND OBJECTIVE BOX
Patient is a 89y Male whom presented to the hospital with ckd stage3     PAST MEDICAL & SURGICAL HISTORY:  CHF, chronic      CAD (coronary artery disease)      Essential hypertension      FH: CABG (coronary artery bypass surgery)          MEDICATIONS  (STANDING):  aspirin enteric coated 81 milliGRAM(s) Oral daily  atorvastatin 80 milliGRAM(s) Oral at bedtime  enoxaparin Injectable 40 milliGRAM(s) SubCutaneous every 24 hours  folic acid 1 milliGRAM(s) Oral daily  furosemide   Injectable 20 milliGRAM(s) IV Push two times a day  levETIRAcetam 500 milliGRAM(s) Oral two times a day  metoprolol tartrate 25 milliGRAM(s) Oral two times a day  mirtazapine 15 milliGRAM(s) Oral daily  pantoprazole    Tablet 40 milliGRAM(s) Oral before breakfast      Allergies    No Known Allergies    Intolerances        SOCIAL HISTORY:  Denies ETOh,Smoking,     FAMILY HISTORY:      REVIEW OF SYSTEMS:    CONSTITUTIONAL: No weakness, fevers or chills  RESPIRATORY: No cough, wheezing, hemoptysis; No shortness of breath                             08-09    143  |  103  |  42<H>  ----------------------------<  102<H>  3.4<L>   |  23  |  2.23<H>    Ca    9.6      09 Aug 2023 07:17  Mg     1.9     08-08        CAPILLARY BLOOD GLUCOSE          Vital Signs Last 24 Hrs  T(C): 36.6 (09 Aug 2023 11:05), Max: 37 (09 Aug 2023 04:42)  T(F): 97.8 (09 Aug 2023 11:05), Max: 98.6 (09 Aug 2023 04:42)  HR: 60 (09 Aug 2023 16:41) (58 - 60)  BP: 98/55 (09 Aug 2023 16:41) (94/54 - 115/64)  BP(mean): --  RR: 18 (09 Aug 2023 11:05) (18 - 18)  SpO2: 94% (09 Aug 2023 11:05) (94% - 97%)    Parameters below as of 09 Aug 2023 11:05  Patient On (Oxygen Delivery Method): room air        Urinalysis Basic - ( 09 Aug 2023 07:17 )    Color: x / Appearance: x / SG: x / pH: x  Gluc: 102 mg/dL / Ketone: x  / Bili: x / Urobili: x   Blood: x / Protein: x / Nitrite: x   Leuk Esterase: x / RBC: x / WBC x   Sq Epi: x / Non Sq Epi: x / Bacteria: x              PHYSICAL EXAM:    Constitutional: NAD  HEENT: conjunctive   clear   Neck:  No JVD  Respiratory: decrease bs b/l   Cardiovascular: S1 and S2  Gastrointestinal: BS+, soft, NT/ND  Extremities: No peripheral edema     Patient is dead based on Cardiopulmonary criteria including absent breath sounds, pulselessness and/or asystole

## 2024-03-11 NOTE — PATIENT PROFILE ADULT - NSPROPOAPRESSUREINJURY_GEN_A_NUR
Occupational Therapy Cancel Note        Patient Name: Michael Koch  Today's Date: 3/11/2024         03/11/24 142   Note Type   Note Type Cancelled Session   Cancel Reasons Medical status       Chart reviewed. Attempted to see pt for OT tx session this date. At this time, OT session cancelled due to pt with hemoglobin of 6.2 Will hold OT this date, continue to follow and see patient as medically appropriate at a later time.      Ted Betts MS, OTR/L      no
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
